# Patient Record
Sex: MALE | Race: WHITE | Employment: OTHER | URBAN - METROPOLITAN AREA
[De-identification: names, ages, dates, MRNs, and addresses within clinical notes are randomized per-mention and may not be internally consistent; named-entity substitution may affect disease eponyms.]

---

## 2017-05-23 ENCOUNTER — OFFICE VISIT (OUTPATIENT)
Dept: CARDIOLOGY CLINIC | Age: 62
End: 2017-05-23

## 2017-05-23 VITALS
HEIGHT: 69 IN | DIASTOLIC BLOOD PRESSURE: 80 MMHG | BODY MASS INDEX: 32.22 KG/M2 | HEART RATE: 84 BPM | SYSTOLIC BLOOD PRESSURE: 120 MMHG | WEIGHT: 217.5 LBS

## 2017-05-23 DIAGNOSIS — I10 ESSENTIAL HYPERTENSION: Primary | ICD-10-CM

## 2017-05-23 DIAGNOSIS — E78.2 MIXED HYPERLIPIDEMIA: ICD-10-CM

## 2017-05-23 DIAGNOSIS — R73.9 HYPERGLYCEMIA: ICD-10-CM

## 2017-05-23 PROCEDURE — 99214 OFFICE O/P EST MOD 30 MIN: CPT | Performed by: NURSE PRACTITIONER

## 2017-05-23 RX ORDER — BENAZEPRIL HYDROCHLORIDE 40 MG/1
40 TABLET, FILM COATED ORAL DAILY
Qty: 90 TABLET | Refills: 3 | Status: SHIPPED | OUTPATIENT
Start: 2017-05-23 | End: 2017-07-17 | Stop reason: SDUPTHER

## 2017-05-23 RX ORDER — AMLODIPINE BESYLATE 5 MG/1
5 TABLET ORAL DAILY
Qty: 90 TABLET | Refills: 3 | Status: SHIPPED | OUTPATIENT
Start: 2017-05-23 | End: 2017-07-17 | Stop reason: SDUPTHER

## 2017-06-23 ENCOUNTER — OFFICE VISIT (OUTPATIENT)
Dept: FAMILY MEDICINE CLINIC | Age: 62
End: 2017-06-23

## 2017-06-23 VITALS
DIASTOLIC BLOOD PRESSURE: 86 MMHG | SYSTOLIC BLOOD PRESSURE: 132 MMHG | TEMPERATURE: 99.1 F | RESPIRATION RATE: 16 BRPM | HEART RATE: 64 BPM | HEIGHT: 69 IN | OXYGEN SATURATION: 96 % | BODY MASS INDEX: 32.23 KG/M2 | WEIGHT: 217.6 LBS

## 2017-06-23 DIAGNOSIS — I10 ESSENTIAL HYPERTENSION: ICD-10-CM

## 2017-06-23 DIAGNOSIS — R73.9 HYPERGLYCEMIA: ICD-10-CM

## 2017-06-23 DIAGNOSIS — E11.9 NON-INSULIN TREATED TYPE 2 DIABETES MELLITUS (HCC): Primary | ICD-10-CM

## 2017-06-23 DIAGNOSIS — E78.49 OTHER HYPERLIPIDEMIA: ICD-10-CM

## 2017-06-23 DIAGNOSIS — K21.9 GASTROESOPHAGEAL REFLUX DISEASE, ESOPHAGITIS PRESENCE NOT SPECIFIED: ICD-10-CM

## 2017-06-23 PROCEDURE — 99214 OFFICE O/P EST MOD 30 MIN: CPT | Performed by: INTERNAL MEDICINE

## 2017-06-23 ASSESSMENT — PATIENT HEALTH QUESTIONNAIRE - PHQ9
SUM OF ALL RESPONSES TO PHQ9 QUESTIONS 1 & 2: 0
1. LITTLE INTEREST OR PLEASURE IN DOING THINGS: 0
SUM OF ALL RESPONSES TO PHQ QUESTIONS 1-9: 0
2. FEELING DOWN, DEPRESSED OR HOPELESS: 0

## 2017-06-23 ASSESSMENT — ENCOUNTER SYMPTOMS
DIARRHEA: 0
CONSTIPATION: 0
SHORTNESS OF BREATH: 0
WHEEZING: 0

## 2017-07-17 ENCOUNTER — HOSPITAL ENCOUNTER (OUTPATIENT)
Dept: DIABETES SERVICES | Age: 62
Discharge: HOME | End: 2017-07-18

## 2017-07-17 ENCOUNTER — HOSPITAL ENCOUNTER (OUTPATIENT)
Dept: OTHER | Age: 62
Discharge: HOME | End: 2017-07-31
Attending: INTERNAL MEDICINE | Admitting: INTERNAL MEDICINE

## 2017-07-17 DIAGNOSIS — E11.9 TYPE 2 DIABETES MELLITUS WITHOUT COMPLICATION, WITHOUT LONG-TERM CURRENT USE OF INSULIN (HCC): Primary | ICD-10-CM

## 2017-07-17 RX ORDER — AMLODIPINE BESYLATE 5 MG/1
5 TABLET ORAL DAILY
Qty: 90 TABLET | Refills: 3 | Status: SHIPPED | OUTPATIENT
Start: 2017-07-17 | End: 2018-05-15 | Stop reason: SDUPTHER

## 2017-07-17 RX ORDER — BENAZEPRIL HYDROCHLORIDE 40 MG/1
40 TABLET, FILM COATED ORAL DAILY
Qty: 90 TABLET | Refills: 2 | Status: SHIPPED | OUTPATIENT
Start: 2017-07-17 | End: 2018-05-15 | Stop reason: SDUPTHER

## 2017-07-17 ASSESSMENT — PATIENT HEALTH QUESTIONNAIRE - PHQ9
SUM OF ALL RESPONSES TO PHQ QUESTIONS 1-9: 1
2. FEELING DOWN, DEPRESSED OR HOPELESS: 1
SUM OF ALL RESPONSES TO PHQ9 QUESTIONS 1 & 2: 1
1. LITTLE INTEREST OR PLEASURE IN DOING THINGS: 0

## 2017-07-27 ENCOUNTER — OFFICE VISIT (OUTPATIENT)
Dept: FAMILY MEDICINE CLINIC | Age: 62
End: 2017-07-27

## 2017-07-27 VITALS
TEMPERATURE: 97.8 F | WEIGHT: 204 LBS | RESPIRATION RATE: 16 BRPM | SYSTOLIC BLOOD PRESSURE: 118 MMHG | BODY MASS INDEX: 30.13 KG/M2 | DIASTOLIC BLOOD PRESSURE: 62 MMHG | OXYGEN SATURATION: 98 % | HEART RATE: 90 BPM

## 2017-07-27 DIAGNOSIS — E11.9 NON-INSULIN TREATED TYPE 2 DIABETES MELLITUS (HCC): Primary | ICD-10-CM

## 2017-07-27 DIAGNOSIS — I10 ESSENTIAL HYPERTENSION: ICD-10-CM

## 2017-07-27 DIAGNOSIS — E11.9 NON-INSULIN TREATED TYPE 2 DIABETES MELLITUS (HCC): ICD-10-CM

## 2017-07-27 LAB
CREATININE URINE: 120.3 MG/DL (ref 39–259)
MICROALBUMIN UR-MCNC: <1.2 MG/DL
MICROALBUMIN/CREAT UR-RTO: NORMAL MG/G (ref 0–30)

## 2017-07-27 PROCEDURE — 99214 OFFICE O/P EST MOD 30 MIN: CPT | Performed by: INTERNAL MEDICINE

## 2017-07-27 ASSESSMENT — ENCOUNTER SYMPTOMS
WHEEZING: 0
DIARRHEA: 0
SHORTNESS OF BREATH: 0
NAUSEA: 0

## 2017-08-22 ENCOUNTER — HOSPITAL ENCOUNTER (OUTPATIENT)
Dept: DIABETES SERVICES | Age: 62
Discharge: HOME | End: 2017-08-23
Admitting: INTERNAL MEDICINE

## 2017-08-22 DIAGNOSIS — E11.9 TYPE 2 DIABETES MELLITUS WITHOUT COMPLICATION, WITHOUT LONG-TERM CURRENT USE OF INSULIN (HCC): Primary | ICD-10-CM

## 2017-09-01 ENCOUNTER — HOSPITAL ENCOUNTER (OUTPATIENT)
Dept: OTHER | Age: 62
Discharge: HOME | End: 2017-09-30
Attending: INTERNAL MEDICINE | Admitting: INTERNAL MEDICINE

## 2017-09-05 ENCOUNTER — HOSPITAL ENCOUNTER (OUTPATIENT)
Dept: DIABETES SERVICES | Age: 62
Discharge: HOME | End: 2017-09-06
Admitting: INTERNAL MEDICINE

## 2017-09-05 DIAGNOSIS — E11.9 TYPE 2 DIABETES MELLITUS WITHOUT COMPLICATION, WITHOUT LONG-TERM CURRENT USE OF INSULIN (HCC): Primary | ICD-10-CM

## 2017-09-12 ENCOUNTER — OFFICE VISIT (OUTPATIENT)
Dept: FAMILY MEDICINE CLINIC | Age: 62
End: 2017-09-12

## 2017-09-12 VITALS
BODY MASS INDEX: 29.33 KG/M2 | DIASTOLIC BLOOD PRESSURE: 70 MMHG | RESPIRATION RATE: 18 BRPM | HEIGHT: 69 IN | WEIGHT: 198 LBS | HEART RATE: 68 BPM | SYSTOLIC BLOOD PRESSURE: 122 MMHG

## 2017-09-12 DIAGNOSIS — E78.5 HYPERLIPIDEMIA, UNSPECIFIED HYPERLIPIDEMIA TYPE: ICD-10-CM

## 2017-09-12 DIAGNOSIS — E11.9 CONTROLLED TYPE 2 DIABETES MELLITUS WITHOUT COMPLICATION, WITHOUT LONG-TERM CURRENT USE OF INSULIN (HCC): Primary | ICD-10-CM

## 2017-09-12 DIAGNOSIS — I10 ESSENTIAL HYPERTENSION: ICD-10-CM

## 2017-09-12 PROCEDURE — 99213 OFFICE O/P EST LOW 20 MIN: CPT | Performed by: INTERNAL MEDICINE

## 2017-09-12 ASSESSMENT — ENCOUNTER SYMPTOMS
VOMITING: 0
DIARRHEA: 1
WHEEZING: 0
NAUSEA: 0
BACK PAIN: 0
SHORTNESS OF BREATH: 0

## 2017-09-16 ENCOUNTER — TELEPHONE (OUTPATIENT)
Dept: FAMILY MEDICINE CLINIC | Age: 62
End: 2017-09-16

## 2017-09-19 ENCOUNTER — HOSPITAL ENCOUNTER (OUTPATIENT)
Dept: DIABETES SERVICES | Age: 62
Discharge: HOME | End: 2017-09-20
Admitting: INTERNAL MEDICINE

## 2017-09-19 DIAGNOSIS — E11.9 TYPE 2 DIABETES MELLITUS WITHOUT COMPLICATION, WITHOUT LONG-TERM CURRENT USE OF INSULIN (HCC): Primary | ICD-10-CM

## 2017-09-22 ENCOUNTER — TELEPHONE (OUTPATIENT)
Dept: FAMILY MEDICINE CLINIC | Age: 62
End: 2017-09-22

## 2017-09-27 ENCOUNTER — TELEPHONE (OUTPATIENT)
Dept: FAMILY MEDICINE CLINIC | Age: 62
End: 2017-09-27

## 2017-10-24 ENCOUNTER — HOSPITAL ENCOUNTER (OUTPATIENT)
Dept: DIABETES SERVICES | Age: 62
Discharge: HOME | End: 2017-10-25
Admitting: INTERNAL MEDICINE

## 2017-10-24 DIAGNOSIS — E11.9 TYPE 2 DIABETES MELLITUS WITHOUT COMPLICATION, WITHOUT LONG-TERM CURRENT USE OF INSULIN (HCC): Primary | ICD-10-CM

## 2017-11-01 ENCOUNTER — HOSPITAL ENCOUNTER (OUTPATIENT)
Dept: OTHER | Age: 62
Discharge: HOME | End: 2017-11-30
Attending: INTERNAL MEDICINE | Admitting: INTERNAL MEDICINE

## 2017-12-08 ENCOUNTER — TELEPHONE (OUTPATIENT)
Dept: FAMILY MEDICINE CLINIC | Age: 62
End: 2017-12-08

## 2017-12-11 ENCOUNTER — TELEPHONE (OUTPATIENT)
Dept: FAMILY MEDICINE CLINIC | Age: 62
End: 2017-12-11

## 2017-12-11 NOTE — TELEPHONE ENCOUNTER
pls tell him the orders are already in the computer. Were placed before . Come in fasting and will also ck chol.

## 2017-12-11 NOTE — TELEPHONE ENCOUNTER
Pt given message per Dr Mccarthy Du Bois  Will call back with fax number of where he would like labs sent

## 2018-01-10 ENCOUNTER — NURSE ONLY (OUTPATIENT)
Dept: FAMILY MEDICINE CLINIC | Age: 63
End: 2018-01-10

## 2018-01-10 ENCOUNTER — OFFICE VISIT (OUTPATIENT)
Dept: FAMILY MEDICINE CLINIC | Age: 63
End: 2018-01-10

## 2018-01-10 VITALS
DIASTOLIC BLOOD PRESSURE: 72 MMHG | SYSTOLIC BLOOD PRESSURE: 118 MMHG | RESPIRATION RATE: 14 BRPM | HEART RATE: 58 BPM | WEIGHT: 201 LBS | HEIGHT: 69 IN | BODY MASS INDEX: 29.77 KG/M2 | OXYGEN SATURATION: 96 %

## 2018-01-10 DIAGNOSIS — K21.9 GASTROESOPHAGEAL REFLUX DISEASE, ESOPHAGITIS PRESENCE NOT SPECIFIED: Primary | ICD-10-CM

## 2018-01-10 DIAGNOSIS — I10 ESSENTIAL HYPERTENSION: ICD-10-CM

## 2018-01-10 DIAGNOSIS — Z23 NEED FOR 23-POLYVALENT PNEUMOCOCCAL POLYSACCHARIDE VACCINE: Primary | ICD-10-CM

## 2018-01-10 DIAGNOSIS — E11.9 CONTROLLED TYPE 2 DIABETES MELLITUS WITHOUT COMPLICATION, WITHOUT LONG-TERM CURRENT USE OF INSULIN (HCC): ICD-10-CM

## 2018-01-10 DIAGNOSIS — Z23 NEEDS FLU SHOT: ICD-10-CM

## 2018-01-10 PROCEDURE — 90471 IMMUNIZATION ADMIN: CPT | Performed by: INTERNAL MEDICINE

## 2018-01-10 PROCEDURE — 90732 PPSV23 VACC 2 YRS+ SUBQ/IM: CPT | Performed by: INTERNAL MEDICINE

## 2018-01-10 PROCEDURE — 90472 IMMUNIZATION ADMIN EACH ADD: CPT | Performed by: INTERNAL MEDICINE

## 2018-01-10 PROCEDURE — 90630 INFLUENZA, QUADV, 18-64 YRS, ID, PF, MICRO INJ, 0.1ML (FLUZONE QUADV, PF): CPT | Performed by: INTERNAL MEDICINE

## 2018-01-10 PROCEDURE — 99214 OFFICE O/P EST MOD 30 MIN: CPT | Performed by: INTERNAL MEDICINE

## 2018-01-10 ASSESSMENT — ENCOUNTER SYMPTOMS
WHEEZING: 0
DIARRHEA: 1
SHORTNESS OF BREATH: 0
CONSTIPATION: 0

## 2018-01-10 NOTE — PROGRESS NOTES
Pt was here for a visit with Dr. Luciano Sargent and left without getting vaccines. He came back to get his flu and [id number]. Vaccine Information Sheet, \"Influenza - Inactivated\"  given to Catrachito Sanabria, or parent/legal guardian of  Catrachito Sanabria and verbalized understanding. Patient responses:    Have you ever had a reaction to a flu vaccine? No  Are you able to eat eggs without adverse effects? No  Do you have any current illness? No  Have you ever had Guillian Viper Syndrome? No    Flu vaccine given per order. Please see immunization tab.

## 2018-03-05 ENCOUNTER — TELEPHONE (OUTPATIENT)
Dept: CARDIOLOGY CLINIC | Age: 63
End: 2018-03-05

## 2018-03-05 DIAGNOSIS — E78.5 HYPERLIPIDEMIA, UNSPECIFIED HYPERLIPIDEMIA TYPE: Primary | ICD-10-CM

## 2018-05-15 ENCOUNTER — OFFICE VISIT (OUTPATIENT)
Dept: CARDIOLOGY CLINIC | Age: 63
End: 2018-05-15

## 2018-05-15 VITALS
BODY MASS INDEX: 31.1 KG/M2 | HEART RATE: 62 BPM | HEIGHT: 69 IN | WEIGHT: 210 LBS | SYSTOLIC BLOOD PRESSURE: 120 MMHG | DIASTOLIC BLOOD PRESSURE: 70 MMHG

## 2018-05-15 DIAGNOSIS — E78.5 HYPERLIPIDEMIA, UNSPECIFIED HYPERLIPIDEMIA TYPE: ICD-10-CM

## 2018-05-15 DIAGNOSIS — I10 ESSENTIAL HYPERTENSION: Primary | ICD-10-CM

## 2018-05-15 PROCEDURE — 99213 OFFICE O/P EST LOW 20 MIN: CPT | Performed by: NURSE PRACTITIONER

## 2018-05-15 RX ORDER — AMLODIPINE BESYLATE 5 MG/1
5 TABLET ORAL DAILY
Qty: 90 TABLET | Refills: 3 | Status: SHIPPED | OUTPATIENT
Start: 2018-05-15 | End: 2018-08-22 | Stop reason: SDUPTHER

## 2018-05-15 RX ORDER — BENAZEPRIL HYDROCHLORIDE 40 MG/1
40 TABLET, FILM COATED ORAL DAILY
Qty: 90 TABLET | Refills: 2 | Status: SHIPPED | OUTPATIENT
Start: 2018-05-15 | End: 2018-06-28 | Stop reason: SDUPTHER

## 2018-05-16 ENCOUNTER — TELEPHONE (OUTPATIENT)
Dept: FAMILY MEDICINE CLINIC | Age: 63
End: 2018-05-16

## 2018-05-21 ENCOUNTER — HOSPITAL ENCOUNTER (OUTPATIENT)
Dept: OTHER | Age: 63
Discharge: HOME | End: 2018-05-21
Attending: INTERNAL MEDICINE | Admitting: INTERNAL MEDICINE

## 2018-05-21 ENCOUNTER — OFFICE VISIT (OUTPATIENT)
Dept: FAMILY MEDICINE CLINIC | Age: 63
End: 2018-05-21

## 2018-05-21 VITALS
RESPIRATION RATE: 16 BRPM | BODY MASS INDEX: 31.1 KG/M2 | HEIGHT: 69 IN | DIASTOLIC BLOOD PRESSURE: 70 MMHG | WEIGHT: 210 LBS | OXYGEN SATURATION: 97 % | SYSTOLIC BLOOD PRESSURE: 120 MMHG | HEART RATE: 60 BPM

## 2018-05-21 DIAGNOSIS — Z00.00 PE (PHYSICAL EXAM), ANNUAL: Primary | ICD-10-CM

## 2018-05-21 DIAGNOSIS — R06.02 SOB (SHORTNESS OF BREATH) ON EXERTION: ICD-10-CM

## 2018-05-21 DIAGNOSIS — I10 ESSENTIAL HYPERTENSION: ICD-10-CM

## 2018-05-21 DIAGNOSIS — E11.9 CONTROLLED TYPE 2 DIABETES MELLITUS WITHOUT COMPLICATION, WITHOUT LONG-TERM CURRENT USE OF INSULIN (HCC): ICD-10-CM

## 2018-05-21 DIAGNOSIS — Z12.5 SCREENING FOR PROSTATE CANCER: ICD-10-CM

## 2018-05-21 PROCEDURE — 99396 PREV VISIT EST AGE 40-64: CPT | Performed by: INTERNAL MEDICINE

## 2018-05-21 PROCEDURE — 93000 ELECTROCARDIOGRAM COMPLETE: CPT | Performed by: INTERNAL MEDICINE

## 2018-05-21 RX ORDER — METFORMIN HYDROCHLORIDE 500 MG/1
TABLET, EXTENDED RELEASE ORAL
Qty: 90 TABLET | Refills: 3 | Status: SHIPPED | OUTPATIENT
Start: 2018-05-21 | End: 2018-08-20 | Stop reason: SDUPTHER

## 2018-05-21 ASSESSMENT — ENCOUNTER SYMPTOMS
COLOR CHANGE: 0
EYE PAIN: 0
CONSTIPATION: 0
DIARRHEA: 0
VOMITING: 0
NAUSEA: 0
WHEEZING: 0
SHORTNESS OF BREATH: 1

## 2018-06-28 RX ORDER — BENAZEPRIL HYDROCHLORIDE 40 MG/1
40 TABLET, FILM COATED ORAL DAILY
Qty: 90 TABLET | Refills: 2 | Status: SHIPPED | OUTPATIENT
Start: 2018-06-28 | End: 2019-06-24 | Stop reason: DRUGHIGH

## 2018-08-13 LAB
AVERAGE GLUCOSE: NORMAL
HBA1C MFR BLD: 5.7 %
PROSTATE SPECIFIC ANTIGEN FREE: NORMAL NG/ML
PROSTATE SPECIFIC ANTIGEN PERCENT FREE: NORMAL %
PSA-PROSTATE SPECIFIC AG: 1

## 2018-08-14 ENCOUNTER — TELEPHONE (OUTPATIENT)
Dept: FAMILY MEDICINE CLINIC | Age: 63
End: 2018-08-14

## 2018-08-14 NOTE — TELEPHONE ENCOUNTER
Call pt ,  Reviewed labs from [facility] from  8/13/18  psa fine  hga1c is just barely on the prediabetes cut off . Much better than a prior level of  7.7  Congratulations.   Renal profile is normal

## 2018-08-20 ENCOUNTER — OFFICE VISIT (OUTPATIENT)
Dept: FAMILY MEDICINE CLINIC | Age: 63
End: 2018-08-20

## 2018-08-20 VITALS
HEART RATE: 69 BPM | WEIGHT: 205 LBS | DIASTOLIC BLOOD PRESSURE: 72 MMHG | SYSTOLIC BLOOD PRESSURE: 116 MMHG | BODY MASS INDEX: 30.36 KG/M2 | OXYGEN SATURATION: 96 % | TEMPERATURE: 98.2 F | RESPIRATION RATE: 16 BRPM | HEIGHT: 69 IN

## 2018-08-20 DIAGNOSIS — R73.9 HYPERGLYCEMIA: Primary | ICD-10-CM

## 2018-08-20 DIAGNOSIS — I10 ESSENTIAL HYPERTENSION: ICD-10-CM

## 2018-08-20 DIAGNOSIS — E78.5 DYSLIPIDEMIA: ICD-10-CM

## 2018-08-20 DIAGNOSIS — E78.5 HYPERLIPIDEMIA, UNSPECIFIED HYPERLIPIDEMIA TYPE: ICD-10-CM

## 2018-08-20 DIAGNOSIS — E11.9 CONTROLLED TYPE 2 DIABETES MELLITUS WITHOUT COMPLICATION, WITHOUT LONG-TERM CURRENT USE OF INSULIN (HCC): ICD-10-CM

## 2018-08-20 PROCEDURE — 99214 OFFICE O/P EST MOD 30 MIN: CPT | Performed by: INTERNAL MEDICINE

## 2018-08-20 RX ORDER — METFORMIN HYDROCHLORIDE 500 MG/1
TABLET, EXTENDED RELEASE ORAL
Qty: 90 TABLET | Refills: 3 | Status: SHIPPED | OUTPATIENT
Start: 2018-08-20 | End: 2018-12-06 | Stop reason: SDUPTHER

## 2018-08-20 ASSESSMENT — PATIENT HEALTH QUESTIONNAIRE - PHQ9
SUM OF ALL RESPONSES TO PHQ QUESTIONS 1-9: 0
1. LITTLE INTEREST OR PLEASURE IN DOING THINGS: 0
SUM OF ALL RESPONSES TO PHQ QUESTIONS 1-9: 0
2. FEELING DOWN, DEPRESSED OR HOPELESS: 0
SUM OF ALL RESPONSES TO PHQ9 QUESTIONS 1 & 2: 0

## 2018-08-20 ASSESSMENT — ENCOUNTER SYMPTOMS
CONSTIPATION: 0
DIARRHEA: 0

## 2018-08-20 NOTE — PROGRESS NOTES
and palpitations. Gastrointestinal: Negative for constipation and diarrhea. Neurological: Positive for headaches (caffeine withdraw headache). Negative for dizziness. Health Maintenance   Topic Date Due    Hepatitis C screen  1955    HIV screen  11/28/1970    Shingles Vaccine (1 of 2 - 2 Dose Series) 11/28/2005    Diabetic microalbuminuria test  07/27/2018    Flu vaccine (1) 09/01/2018    DTaP/Tdap/Td vaccine (2 - Td) 02/24/2019    Diabetic retinal exam  04/21/2019    Lipid screen  05/03/2019    Potassium monitoring  05/03/2019    Creatinine monitoring  05/03/2019    Diabetic foot exam  05/21/2019    A1C test (Diabetic or Prediabetic)  08/13/2019    Colon cancer screen colonoscopy  01/01/2025    Pneumococcal med risk  Completed      Social History     Social History    Marital status:      Spouse name: N/A    Number of children: 3    Years of education: N/A     Occupational History    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 Prior to Visit Medications    Medication Sig Taking?  Authorizing Provider   metFORMIN (GLUCOPHAGE) 500 MG tablet TAKE 1 TABLET TWICE DAILY  WITH MEALS Yes Evangelista Louis MD   benazepril (LOTENSIN) 40 MG tablet Take 1 tablet by mouth daily Yes GAL Euceda CNP   metFORMIN (GLUCOPHAGE XR) 500 MG extended release tablet Take 3 pills in the am Yes Evangelista Louis MD   amLODIPine (NORVASC) 5 MG tablet Take 1 tablet by mouth daily Yes Bradley Robe, APRN - CNP [facility] primary prevention;  <70 mg/dL for patients with CHD or diabetic patients  with > or = 2 CHD risk factors. LDL-C is now calculated using the Isrrael-Casanova  calculation, which is a validated novel method providing  better accuracy than the Friedewald equation in the  estimation of LDL-C. Wade Whaley et al. Tammie Route. 3298;342(34): 8392-7277  (http://[employer]. TopSchool/faq/RWV132)     05/03/2018 76 mg/dL (calc) Final     Comment:     Reference range: <100  Desirable range <100 mg/dL for primary prevention;  <70 mg/dL for patients with CHD or diabetic patients  with > or = 2 CHD risk factors. LDL-C is now calculated using the Isrrael-Casanova  calculation, which is a validated novel method providing  better accuracy than the Friedewald equation in the  estimation of LDL-C. Wade Whaley et al. [facility] Route. 9365;24504): 0383-8346  (http://[employer]. [employer]/faq/BPG207)       HDL   Date Value Ref Range Status   05/03/2018 46 >40 mg/dL Final   05/03/2018 46 >40 mg/dL Final   08/19/2010 40 40 - 60 mg/dl Final     Triglycerides   Date Value Ref Range Status   05/03/2018 140 <150 mg/dL Final   05/03/2018 140 <150 mg/dL Final     Lab Results   Component Value Date    ALT 23 05/03/2018    AST 24 05/03/2018    ALKPHOS 67 05/09/2016    BILITOT 1.3 (H) 05/09/2016      Lab Results   Component Value Date    WBC 7.5 07/25/2016    HGB 15.8 07/25/2016    HCT 47.4 07/25/2016    MCV 96.0 07/25/2016     07/25/2016     TSH (uIU/mL)   Date Value   10/28/2013 1.19     Lab Results   Component Value Date    LABA1C 5.7 08/13/2018     Lab Results   Component Value Date    PSA 1.0 08/13/2018    PSA 0.77 10/28/2013        PHYSICAL EXAM:  /72 (Site: Right Arm, Position: Sitting, Cuff Size: Medium Adult)   Pulse 69   Temp 98.2 °F (36.8 °C) (Oral)   Resp 16   Ht 5' 9\" (1.753 m)   Wt 205 lb (93 kg)   SpO2 96%   BMI 30.27 kg/m²    Physical Exam   Constitutional: He appears well-developed and well-nourished.    HENT: Head: Normocephalic and atraumatic. Cardiovascular: Normal rate and regular rhythm. No murmur heard. Pulmonary/Chest: Effort normal and breath sounds normal. He has no wheezes. Abdominal: There is no tenderness. Neurological: He is alert. Skin: Skin is warm and dry. Psychiatric: He has a normal mood and affect. His behavior is normal. Judgment and thought content normal.     BP Readings from Last 5 Encounters:   08/20/18 116/72   05/21/18 120/70   05/15/18 120/70   01/10/18 118/72   09/12/17 122/70       Wt Readings from Last 5 Encounters:   08/20/18 205 lb (93 kg)   05/21/18 210 lb (95.3 kg)   05/15/18 210 lb (95.3 kg)   01/10/18 201 lb (91.2 kg)   09/12/17 198 lb (89.8 kg)        ASSESSMENT/PLAN:  Lawanda Wood was seen today for diabetes. Diagnoses and all orders for this visit:      Essential hypertension  -     Comprehensive Metabolic Panel; Future    Controlled type 2 diabetes mellitus without complication, without long-term current use of insulin (HCC)  -     Microalbumin / creatinine urine ratio; Future    Hyperlipidemia, unspecified hyperlipidemia type    Other orders  -     metFORMIN (GLUCOPHAGE XR) 500 MG extended release tablet; Take 1 in the am and 2 in the pm.    doing great  Fu in  6m  He does not want to start chol med at this time  Gave him information links about ascvd risk sore.

## 2018-08-20 NOTE — PROGRESS NOTES
Letter sent.
history on file. Prior to Visit Medications    Medication Sig Taking? Authorizing Provider   metFORMIN (GLUCOPHAGE) 500 MG tablet Take 2 bid Yes Leslye Post MD   [facility] DELICA LANCETS FINE MISC 3 each by Does not apply route 3 times daily Yes Leslye Post MD   Blood Glucose Monitoring Suppl (ONE TOUCH ULTRA 2) w/Device KIT 1 kit by Other route 3 times daily Yes Leslye Post MD   ONE TOUCH ULTRA TEST strip TEST BLOOD SUGAR ONCE DAILY Yes Leslye Post MD   amLODIPine (NORVASC) 5 MG tablet Take 1 tablet by mouth daily Yes Pat Wagner NP   benazepril (LOTENSIN) 40 MG tablet Take 1 tablet by mouth daily Yes Pat Wagner NP   Esomeprazole Magnesium (NEXIUM PO) Take by mouth Yes Historical Provider, MD     Patient Active Problem List   Diagnosis    Kidney stone on right side-(7/11)    ED (erectile dysfunction) of organic origin    Colonic polyps(COLO 2009--REPEAT 1/15-polyp x 1-ghastine    Hyperlipemia    Hyperglycemia    Obesity-advised diet/exercise    Anisocoria-right eye--chronic     H/O low back pain,Mild     Medial meniscus tear,Right    GERD (gastroesophageal reflux disease)--on daily ppi    Essential hypertension    Closed fracture of radius    Carpal tunnel syndrome of left wrist        LABS:   Lab Results   Component Value Date    GLUCOSE 154 (H) 05/15/2017     Lab Results   Component Value Date     05/15/2017    K 4.4 05/15/2017    CREATININE 0.91 05/15/2017     Cholesterol, Total   Date Value Ref Range Status   05/15/2017 169 125 - 200 mg/dL Final     Cholesterol   Date Value Ref Range Status   05/15/2017 125 mg/dL (calc) Final     Comment:     Target for non-HDL cholesterol is 30 mg/dL higher than  LDL cholesterol target.        LDL Calculated   Date Value Ref Range Status   05/15/2017 79 <130 mg/dL (calc) Final     Comment:     Desirable range <100 mg/dL for patients with CHD or  diabetes and <70 mg/dL for diabetic patients with  known heart

## 2018-08-22 RX ORDER — AMLODIPINE BESYLATE 5 MG/1
5 TABLET ORAL DAILY
Qty: 90 TABLET | Refills: 3 | Status: ON HOLD | OUTPATIENT
Start: 2018-08-22 | End: 2019-04-17 | Stop reason: HOSPADM

## 2018-08-28 ENCOUNTER — TELEPHONE (OUTPATIENT)
Dept: FAMILY MEDICINE CLINIC | Age: 63
End: 2018-08-28

## 2018-08-28 PROBLEM — E78.5 DYSLIPIDEMIA: Status: ACTIVE | Noted: 2018-08-28

## 2018-08-28 NOTE — TELEPHONE ENCOUNTER
Call pt,  We may have discussed this already , but did not see the scanned lab  His psa was normal  At  1.0. From [facility] in august.    hga1c was  5.7 -barely in the prediabetes range.

## 2018-12-06 RX ORDER — METFORMIN HYDROCHLORIDE 500 MG/1
TABLET, EXTENDED RELEASE ORAL
Qty: 90 TABLET | Refills: 1 | Status: SHIPPED | OUTPATIENT
Start: 2018-12-06 | End: 2019-02-17 | Stop reason: SDUPTHER

## 2018-12-06 NOTE — TELEPHONE ENCOUNTER
From: Miguelina Chisholm  Sent: 12/6/2018 10:01 AM EST  Subject: Medication Renewal Request    Rafael JIMMaryan Engel would like a refill of the following medications:     metFORMIN (GLUCOPHAGE XR) 500 MG extended release tablet Deidra Kenny MD]   Patient Comment: At last doctor visit, prescription was changed to this (one in morning, two in evening). However [employer] order ([facility]) pharmacy was never updated to reflect this. Thus, I receive old presciption which is regular Metformin 2X/day. Please update.     Preferred pharmacy: [facility] [address], [place] - [place] [phone]

## 2019-02-14 LAB
AVERAGE GLUCOSE: NORMAL
HBA1C MFR BLD: 6.2 %

## 2019-02-18 RX ORDER — METFORMIN HYDROCHLORIDE 500 MG/1
TABLET, EXTENDED RELEASE ORAL
Qty: 90 TABLET | Refills: 1 | Status: SHIPPED | OUTPATIENT
Start: 2019-02-18 | End: 2019-04-10 | Stop reason: SDUPTHER

## 2019-02-21 ENCOUNTER — TELEPHONE (OUTPATIENT)
Dept: CARDIOLOGY CLINIC | Age: 64
End: 2019-02-21

## 2019-02-21 DIAGNOSIS — E78.5 HYPERLIPIDEMIA, UNSPECIFIED HYPERLIPIDEMIA TYPE: Primary | ICD-10-CM

## 2019-02-25 ENCOUNTER — OFFICE VISIT (OUTPATIENT)
Dept: FAMILY MEDICINE CLINIC | Age: 64
End: 2019-02-25
Payer: COMMERCIAL

## 2019-02-25 VITALS
DIASTOLIC BLOOD PRESSURE: 80 MMHG | RESPIRATION RATE: 14 BRPM | OXYGEN SATURATION: 96 % | HEART RATE: 66 BPM | BODY MASS INDEX: 31.55 KG/M2 | SYSTOLIC BLOOD PRESSURE: 128 MMHG | WEIGHT: 213 LBS | HEIGHT: 69 IN

## 2019-02-25 DIAGNOSIS — E11.9 CONTROLLED TYPE 2 DIABETES MELLITUS WITHOUT COMPLICATION, WITHOUT LONG-TERM CURRENT USE OF INSULIN (HCC): Primary | ICD-10-CM

## 2019-02-25 DIAGNOSIS — I10 ESSENTIAL HYPERTENSION: ICD-10-CM

## 2019-02-25 DIAGNOSIS — C44.311 BASAL CELL CARCINOMA (BCC) OF SKIN OF NOSE: ICD-10-CM

## 2019-02-25 PROCEDURE — 99213 OFFICE O/P EST LOW 20 MIN: CPT | Performed by: INTERNAL MEDICINE

## 2019-02-25 ASSESSMENT — ENCOUNTER SYMPTOMS
WHEEZING: 0
VOMITING: 0
DIARRHEA: 0
SHORTNESS OF BREATH: 0

## 2019-02-25 ASSESSMENT — PATIENT HEALTH QUESTIONNAIRE - PHQ9
SUM OF ALL RESPONSES TO PHQ QUESTIONS 1-9: 0
SUM OF ALL RESPONSES TO PHQ9 QUESTIONS 1 & 2: 0
SUM OF ALL RESPONSES TO PHQ QUESTIONS 1-9: 0
2. FEELING DOWN, DEPRESSED OR HOPELESS: 0
1. LITTLE INTEREST OR PLEASURE IN DOING THINGS: 0

## 2019-04-11 RX ORDER — METFORMIN HYDROCHLORIDE 500 MG/1
TABLET, EXTENDED RELEASE ORAL
Qty: 90 TABLET | Refills: 0 | Status: SHIPPED | OUTPATIENT
Start: 2019-04-11 | End: 2019-04-30 | Stop reason: SDUPTHER

## 2019-04-16 ENCOUNTER — HOSPITAL ENCOUNTER (INPATIENT)
Age: 64
LOS: 1 days | Discharge: HOME | DRG: 310 | End: 2019-04-17
Attending: FAMILY MEDICINE | Admitting: INTERNAL MEDICINE
Payer: COMMERCIAL

## 2019-04-16 ENCOUNTER — APPOINTMENT (OUTPATIENT)
Dept: GENERAL RADIOLOGY | Age: 64
DRG: 310 | End: 2019-04-16
Payer: COMMERCIAL

## 2019-04-16 DIAGNOSIS — R07.9 ACUTE CHEST PAIN: ICD-10-CM

## 2019-04-16 DIAGNOSIS — Z78.9 ALCOHOL USE: ICD-10-CM

## 2019-04-16 DIAGNOSIS — I48.91 ATRIAL FIBRILLATION WITH RVR (HCC): ICD-10-CM

## 2019-04-16 DIAGNOSIS — I48.91 NEW ONSET A-FIB (HCC): Primary | ICD-10-CM

## 2019-04-16 PROBLEM — I20.89 ANGINA AT REST: Status: ACTIVE | Noted: 2019-04-16

## 2019-04-16 PROBLEM — I20.8 ANGINA AT REST (HCC): Status: ACTIVE | Noted: 2019-04-16

## 2019-04-16 LAB
ANION GAP SERPL CALCULATED.3IONS-SCNC: 21 MMOL/L (ref 3–16)
BUN BLDV-MCNC: 18 MG/DL (ref 7–20)
CALCIUM SERPL-MCNC: 10.3 MG/DL (ref 8.3–10.6)
CHLORIDE BLD-SCNC: 96 MMOL/L (ref 99–110)
CO2: 20 MMOL/L (ref 21–32)
CREAT SERPL-MCNC: 1 MG/DL (ref 0.8–1.3)
D DIMER: 218 NG/ML DDU (ref 0–229)
ETHANOL: 41 MG/DL (ref 0–0.08)
GFR AFRICAN AMERICAN: >60
GFR NON-AFRICAN AMERICAN: >60
GLUCOSE BLD-MCNC: 171 MG/DL (ref 70–99)
GLUCOSE BLD-MCNC: 176 MG/DL (ref 70–99)
GLUCOSE BLD-MCNC: 181 MG/DL (ref 70–99)
GLUCOSE BLD-MCNC: 183 MG/DL (ref 70–99)
GLUCOSE BLD-MCNC: 216 MG/DL (ref 70–99)
HCT VFR BLD CALC: 49.6 % (ref 40.5–52.5)
HEMOGLOBIN: 17 G/DL (ref 13.5–17.5)
MAGNESIUM: 2 MG/DL (ref 1.8–2.4)
MCH RBC QN AUTO: 33.3 PG (ref 26–34)
MCHC RBC AUTO-ENTMCNC: 34.2 G/DL (ref 31–36)
MCV RBC AUTO: 97.5 FL (ref 80–100)
PDW BLD-RTO: 13.2 % (ref 12.4–15.4)
PERFORMED ON: ABNORMAL
PLATELET # BLD: 267 K/UL (ref 135–450)
PMV BLD AUTO: 8.4 FL (ref 5–10.5)
POTASSIUM REFLEX MAGNESIUM: 4.7 MMOL/L (ref 3.5–5.1)
PRO-BNP: 22 PG/ML (ref 0–124)
RBC # BLD: 5.09 M/UL (ref 4.2–5.9)
SODIUM BLD-SCNC: 137 MMOL/L (ref 136–145)
T4 FREE: 1.3 NG/DL (ref 0.9–1.8)
TROPONIN: <0.01 NG/ML
TROPONIN: <0.01 NG/ML
TSH SERPL DL<=0.05 MIU/L-ACNC: 0.71 UIU/ML (ref 0.27–4.2)
WBC # BLD: 10.6 K/UL (ref 4–11)

## 2019-04-16 PROCEDURE — 96365 THER/PROPH/DIAG IV INF INIT: CPT

## 2019-04-16 PROCEDURE — 6370000000 HC RX 637 (ALT 250 FOR IP): Performed by: FAMILY MEDICINE

## 2019-04-16 PROCEDURE — 80048 BASIC METABOLIC PNL TOTAL CA: CPT

## 2019-04-16 PROCEDURE — 99254 IP/OBS CNSLTJ NEW/EST MOD 60: CPT | Performed by: INTERNAL MEDICINE

## 2019-04-16 PROCEDURE — 96366 THER/PROPH/DIAG IV INF ADDON: CPT

## 2019-04-16 PROCEDURE — 6370000000 HC RX 637 (ALT 250 FOR IP): Performed by: INTERNAL MEDICINE

## 2019-04-16 PROCEDURE — 93010 ELECTROCARDIOGRAM REPORT: CPT | Performed by: INTERNAL MEDICINE

## 2019-04-16 PROCEDURE — 85379 FIBRIN DEGRADATION QUANT: CPT

## 2019-04-16 PROCEDURE — 83735 ASSAY OF MAGNESIUM: CPT

## 2019-04-16 PROCEDURE — 83880 ASSAY OF NATRIURETIC PEPTIDE: CPT

## 2019-04-16 PROCEDURE — 2060000000 HC ICU INTERMEDIATE R&B

## 2019-04-16 PROCEDURE — 96367 TX/PROPH/DG ADDL SEQ IV INF: CPT

## 2019-04-16 PROCEDURE — 2500000003 HC RX 250 WO HCPCS: Performed by: INTERNAL MEDICINE

## 2019-04-16 PROCEDURE — 2500000003 HC RX 250 WO HCPCS: Performed by: PHYSICIAN ASSISTANT

## 2019-04-16 PROCEDURE — 85027 COMPLETE CBC AUTOMATED: CPT

## 2019-04-16 PROCEDURE — 94760 N-INVAS EAR/PLS OXIMETRY 1: CPT

## 2019-04-16 PROCEDURE — 93005 ELECTROCARDIOGRAM TRACING: CPT | Performed by: PHYSICIAN ASSISTANT

## 2019-04-16 PROCEDURE — 2580000003 HC RX 258: Performed by: INTERNAL MEDICINE

## 2019-04-16 PROCEDURE — 96376 TX/PRO/DX INJ SAME DRUG ADON: CPT

## 2019-04-16 PROCEDURE — 71045 X-RAY EXAM CHEST 1 VIEW: CPT

## 2019-04-16 PROCEDURE — G0480 DRUG TEST DEF 1-7 CLASSES: HCPCS

## 2019-04-16 PROCEDURE — 84439 ASSAY OF FREE THYROXINE: CPT

## 2019-04-16 PROCEDURE — 2500000003 HC RX 250 WO HCPCS: Performed by: FAMILY MEDICINE

## 2019-04-16 PROCEDURE — 36415 COLL VENOUS BLD VENIPUNCTURE: CPT

## 2019-04-16 PROCEDURE — 6370000000 HC RX 637 (ALT 250 FOR IP): Performed by: STUDENT IN AN ORGANIZED HEALTH CARE EDUCATION/TRAINING PROGRAM

## 2019-04-16 PROCEDURE — 6360000002 HC RX W HCPCS: Performed by: INTERNAL MEDICINE

## 2019-04-16 PROCEDURE — 6360000002 HC RX W HCPCS: Performed by: FAMILY MEDICINE

## 2019-04-16 PROCEDURE — 84443 ASSAY THYROID STIM HORMONE: CPT

## 2019-04-16 PROCEDURE — 84484 ASSAY OF TROPONIN QUANT: CPT

## 2019-04-16 PROCEDURE — 2580000003 HC RX 258: Performed by: PHYSICIAN ASSISTANT

## 2019-04-16 PROCEDURE — 99285 EMERGENCY DEPT VISIT HI MDM: CPT

## 2019-04-16 RX ORDER — MAGNESIUM SULFATE IN WATER 40 MG/ML
2 INJECTION, SOLUTION INTRAVENOUS ONCE
Status: COMPLETED | OUTPATIENT
Start: 2019-04-16 | End: 2019-04-16

## 2019-04-16 RX ORDER — METFORMIN HYDROCHLORIDE 500 MG/1
500 TABLET, EXTENDED RELEASE ORAL 2 TIMES DAILY WITH MEALS
Status: DISCONTINUED | OUTPATIENT
Start: 2019-04-16 | End: 2019-04-16

## 2019-04-16 RX ORDER — SODIUM CHLORIDE 0.9 % (FLUSH) 0.9 %
10 SYRINGE (ML) INJECTION PRN
Status: DISCONTINUED | OUTPATIENT
Start: 2019-04-16 | End: 2019-04-17 | Stop reason: HOSPADM

## 2019-04-16 RX ORDER — DILTIAZEM HYDROCHLORIDE 60 MG/1
60 TABLET, FILM COATED ORAL EVERY 6 HOURS SCHEDULED
Status: DISCONTINUED | OUTPATIENT
Start: 2019-04-16 | End: 2019-04-17

## 2019-04-16 RX ORDER — DEXTROSE MONOHYDRATE 50 MG/ML
100 INJECTION, SOLUTION INTRAVENOUS PRN
Status: DISCONTINUED | OUTPATIENT
Start: 2019-04-16 | End: 2019-04-17 | Stop reason: HOSPADM

## 2019-04-16 RX ORDER — PANTOPRAZOLE SODIUM 40 MG/1
40 TABLET, DELAYED RELEASE ORAL
Status: DISCONTINUED | OUTPATIENT
Start: 2019-04-16 | End: 2019-04-17 | Stop reason: HOSPADM

## 2019-04-16 RX ORDER — DILTIAZEM HYDROCHLORIDE 5 MG/ML
10 INJECTION INTRAVENOUS ONCE
Status: COMPLETED | OUTPATIENT
Start: 2019-04-16 | End: 2019-04-16

## 2019-04-16 RX ORDER — NICOTINE POLACRILEX 4 MG
15 LOZENGE BUCCAL PRN
Status: DISCONTINUED | OUTPATIENT
Start: 2019-04-16 | End: 2019-04-17 | Stop reason: HOSPADM

## 2019-04-16 RX ORDER — ASPIRIN 81 MG/1
324 TABLET, CHEWABLE ORAL ONCE
Status: COMPLETED | OUTPATIENT
Start: 2019-04-16 | End: 2019-04-16

## 2019-04-16 RX ORDER — THIAMINE MONONITRATE (VIT B1) 100 MG
100 TABLET ORAL DAILY
Status: DISCONTINUED | OUTPATIENT
Start: 2019-04-16 | End: 2019-04-17 | Stop reason: HOSPADM

## 2019-04-16 RX ORDER — ALBUTEROL SULFATE 90 UG/1
2 AEROSOL, METERED RESPIRATORY (INHALATION) EVERY 6 HOURS PRN
COMMUNITY
End: 2020-05-21

## 2019-04-16 RX ORDER — METFORMIN HYDROCHLORIDE 500 MG/1
1000 TABLET, EXTENDED RELEASE ORAL
Status: DISCONTINUED | OUTPATIENT
Start: 2019-04-16 | End: 2019-04-16

## 2019-04-16 RX ORDER — METFORMIN HYDROCHLORIDE 500 MG/1
500 TABLET, EXTENDED RELEASE ORAL
Status: DISCONTINUED | OUTPATIENT
Start: 2019-04-16 | End: 2019-04-16

## 2019-04-16 RX ORDER — BENAZEPRIL HYDROCHLORIDE 20 MG/1
40 TABLET ORAL DAILY
Status: DISCONTINUED | OUTPATIENT
Start: 2019-04-16 | End: 2019-04-16 | Stop reason: CLARIF

## 2019-04-16 RX ORDER — SODIUM CHLORIDE 0.9 % (FLUSH) 0.9 %
10 SYRINGE (ML) INJECTION EVERY 12 HOURS SCHEDULED
Status: DISCONTINUED | OUTPATIENT
Start: 2019-04-16 | End: 2019-04-17 | Stop reason: HOSPADM

## 2019-04-16 RX ORDER — DEXTROSE MONOHYDRATE 25 G/50ML
12.5 INJECTION, SOLUTION INTRAVENOUS PRN
Status: DISCONTINUED | OUTPATIENT
Start: 2019-04-16 | End: 2019-04-17 | Stop reason: HOSPADM

## 2019-04-16 RX ORDER — ESOMEPRAZOLE MAGNESIUM 40 MG/1
40 FOR SUSPENSION ORAL DAILY
Status: DISCONTINUED | OUTPATIENT
Start: 2019-04-16 | End: 2019-04-16 | Stop reason: CLARIF

## 2019-04-16 RX ORDER — DILTIAZEM HYDROCHLORIDE 5 MG/ML
20 INJECTION INTRAVENOUS ONCE
Status: COMPLETED | OUTPATIENT
Start: 2019-04-16 | End: 2019-04-16

## 2019-04-16 RX ORDER — FOLIC ACID 1 MG/1
1 TABLET ORAL DAILY
Status: DISCONTINUED | OUTPATIENT
Start: 2019-04-16 | End: 2019-04-17 | Stop reason: HOSPADM

## 2019-04-16 RX ORDER — ONDANSETRON 2 MG/ML
4 INJECTION INTRAMUSCULAR; INTRAVENOUS EVERY 6 HOURS PRN
Status: DISCONTINUED | OUTPATIENT
Start: 2019-04-16 | End: 2019-04-17 | Stop reason: HOSPADM

## 2019-04-16 RX ORDER — LISINOPRIL 20 MG/1
40 TABLET ORAL DAILY
Status: DISCONTINUED | OUTPATIENT
Start: 2019-04-16 | End: 2019-04-17 | Stop reason: HOSPADM

## 2019-04-16 RX ADMIN — Medication 10 ML: at 10:09

## 2019-04-16 RX ADMIN — DILTIAZEM HYDROCHLORIDE 10 MG/HR: 5 INJECTION INTRAVENOUS at 15:47

## 2019-04-16 RX ADMIN — DILTIAZEM HYDROCHLORIDE 60 MG: 60 TABLET, FILM COATED ORAL at 13:24

## 2019-04-16 RX ADMIN — Medication 100 MG: at 17:24

## 2019-04-16 RX ADMIN — INSULIN LISPRO 2 UNITS: 100 INJECTION, SOLUTION INTRAVENOUS; SUBCUTANEOUS at 13:25

## 2019-04-16 RX ADMIN — DILTIAZEM HYDROCHLORIDE 20 MG: 5 INJECTION INTRAVENOUS at 06:35

## 2019-04-16 RX ADMIN — MAGNESIUM SULFATE HEPTAHYDRATE 2 G: 40 INJECTION, SOLUTION INTRAVENOUS at 06:30

## 2019-04-16 RX ADMIN — PANTOPRAZOLE SODIUM 40 MG: 40 TABLET, DELAYED RELEASE ORAL at 10:08

## 2019-04-16 RX ADMIN — ENOXAPARIN SODIUM 30 MG: 30 INJECTION SUBCUTANEOUS at 13:24

## 2019-04-16 RX ADMIN — DILTIAZEM HYDROCHLORIDE 60 MG: 60 TABLET, FILM COATED ORAL at 17:25

## 2019-04-16 RX ADMIN — ENOXAPARIN SODIUM 40 MG: 40 INJECTION SUBCUTANEOUS at 10:07

## 2019-04-16 RX ADMIN — ASPIRIN 81 MG 324 MG: 81 TABLET ORAL at 06:30

## 2019-04-16 RX ADMIN — INSULIN LISPRO 2 UNITS: 100 INJECTION, SOLUTION INTRAVENOUS; SUBCUTANEOUS at 17:25

## 2019-04-16 RX ADMIN — DILTIAZEM HYDROCHLORIDE 10 MG: 5 INJECTION INTRAVENOUS at 07:58

## 2019-04-16 RX ADMIN — DILTIAZEM HYDROCHLORIDE 5 MG/HR: 5 INJECTION INTRAVENOUS at 06:48

## 2019-04-16 RX ADMIN — LISINOPRIL 40 MG: 20 TABLET ORAL at 10:08

## 2019-04-16 RX ADMIN — FOLIC ACID 1 MG: 1 TABLET ORAL at 17:24

## 2019-04-16 RX ADMIN — INSULIN LISPRO 1 UNITS: 100 INJECTION, SOLUTION INTRAVENOUS; SUBCUTANEOUS at 21:46

## 2019-04-16 ASSESSMENT — ENCOUNTER SYMPTOMS
VOMITING: 0
WHEEZING: 0
ABDOMINAL DISTENTION: 0
SINUS PAIN: 0
SHORTNESS OF BREATH: 1
NAUSEA: 0
RECTAL PAIN: 0
EYE PAIN: 0
DIARRHEA: 0
EYE REDNESS: 0
BACK PAIN: 0
ABDOMINAL PAIN: 0
COUGH: 0

## 2019-04-16 ASSESSMENT — PAIN - FUNCTIONAL ASSESSMENT
PAIN_FUNCTIONAL_ASSESSMENT: ACTIVITIES ARE NOT PREVENTED
PAIN_FUNCTIONAL_ASSESSMENT: ACTIVITIES ARE NOT PREVENTED

## 2019-04-16 ASSESSMENT — PAIN DESCRIPTION - ORIENTATION
ORIENTATION: MID

## 2019-04-16 ASSESSMENT — PAIN DESCRIPTION - LOCATION
LOCATION: CHEST

## 2019-04-16 ASSESSMENT — PAIN SCALES - GENERAL
PAINLEVEL_OUTOF10: 4
PAINLEVEL_OUTOF10: 2
PAINLEVEL_OUTOF10: 0
PAINLEVEL_OUTOF10: 0
PAINLEVEL_OUTOF10: 1

## 2019-04-16 ASSESSMENT — PAIN DESCRIPTION - ONSET
ONSET: ON-GOING

## 2019-04-16 ASSESSMENT — PAIN DESCRIPTION - DESCRIPTORS
DESCRIPTORS: TIGHTNESS
DESCRIPTORS: DISCOMFORT

## 2019-04-16 ASSESSMENT — PAIN DESCRIPTION - PROGRESSION
CLINICAL_PROGRESSION: GRADUALLY IMPROVING
CLINICAL_PROGRESSION: GRADUALLY IMPROVING
CLINICAL_PROGRESSION: NOT CHANGED

## 2019-04-16 ASSESSMENT — PAIN DESCRIPTION - FREQUENCY
FREQUENCY: CONTINUOUS

## 2019-04-16 ASSESSMENT — PAIN DESCRIPTION - PAIN TYPE
TYPE: ACUTE PAIN

## 2019-04-16 NOTE — PROGRESS NOTES
4 Eyes Skin Assessment     The patient is being assess for  Admission    I agree that 2 RN's have performed a thorough Head to Toe Skin Assessment on the patient. ALL assessment sites listed below have been assessed. Areas assessed by both nurses: yes  [x]   Head, Face, and Ears   [x]   Shoulders, Back, and Chest  [x]   Arms, Elbows, and Hands   [x]   Coccyx, Sacrum, and IschIum  [x]   Legs, Feet, and Heels        Does the Patient have Skin Breakdown? No.  Pt has small scab to bridge of nose s/p removal of basal cell carcinoma prior to admission/bandaid in place.         Yung Prevention initiated:  No   Wound Care Orders initiated:  No      WOC nurse consulted for Pressure Injury (Stage 3,4, Unstageable, DTI, NWPT, and Complex wounds), New and Established Ostomies:  No      Nurse 1 eSignature: Electronically signed by Bharti Domínguez RN on 4/16/19 at 6:19 PM    **SHARE this note so that the co-signing nurse is able to place an eSignature**    Nurse 2 eSignature: Electronically signed by Malvin Childs RN on 4/16/19 at 6:20 PM

## 2019-04-16 NOTE — CONSULTS
History     Tobacco Use    Smoking status: Former Smoker     Packs/day: 0.50     Years: 40.00     Pack years: 20.00     Start date: 1/17/1976     Last attempt to quit: 1/17/2016     Years since quitting: 3.2    Smokeless tobacco: Never Used    Tobacco comment: smokes cigars 1-2 a week   Substance Use Topics    Alcohol use: Yes     Alcohol/week: 1.8 oz     Types: 3 Cans of beer per week     Comment: much less since dx of diabetes      No family history on file.      Review of Systems:  · Constitutional: No Fever or Weight Loss  · Eyes: No decreased vision  · ENT: No Headaches, Hearing Loss or Vertigo  · Cardiovascular:  chest pain, lightheadedness  · Respiratory: No cough or wheezing    · Gastrointestinal: No abdominal pain, appetite loss, blood in stools, constipation, diarrhea or heartburn  · Genitourinary: No dysuria, trouble voiding, or hematuria  · Musculoskeletal:  No gait disturbance, weakness or joint complaints, left knee pain  · Integumentary: No rash or pruritis  · Neurological: No TIA or stroke symptoms  · Psychiatric: No anxiety or depression  · Endocrine: No malaise, fatigue or temperature intolerance  · Hematologic/Lymphatic: No bleeding problems, blood clots or swollen lymph nodes  · Allergic/Immunologic: No nasal congestion or hives      Objective Data:     BP (!) 159/104   Pulse 121   Temp 98.3 °F (36.8 °C) (Oral)   Resp 20   Wt 213 lb 6.5 oz (96.8 kg)   SpO2 94%   BMI 31.51 kg/m²     General appearance: alert, appears stated age and cooperative  Alert, awake, oriented x 3  Eyes:  No erythema  Head: atraumatic  Neck:  no JVD  Lungs: clear to auscultation bilaterally  Heart: normal apical impulse and irregularly irregular rhythm  Abdomen: soft, non-tender; bowel sounds normal; no masses,  no organomegaly  Extremities: extremities normal, atraumatic, no cyanosis or edema  Skin: Skin color, texture, turgor normal. No rashes or lesions  Hematologic: no remarkable bruising       ECG: atrial

## 2019-04-16 NOTE — ED PROVIDER NOTES
[facility]  eMERGENCY dEPARTMENT eNCOUnter        Pt Name: Imtiaz Trent  MRN: [id number]  [place] 1955  Date of evaluation: 4/16/2019  Provider: ALEXANDRA Navarrete  PCP: Leroy Beavers MD    This patient was seen and evaluated by the attending physician Jose Shannon MD.    [address] [place] [address]       Chief Complaint   Patient presents with    Chest Pain     Presents through [facility], chest pain starting at 0430, severe, SOB with exertion denies cardiac hx       HISTORY OF PRESENT ILLNESS   (Location/Symptom, Timing/Onset, Context/Setting, Quality, Duration, Modifying Factors, Severity)  Note limiting factors. Imtiaz Trent is a 61 y.o. male with pmh of DM, HTN, who presents to the ED for evaluation of SOB, palpitations, chest pain,  PAtient reports waking up around 4 AM this morning because his contacts lens were dry and bothering him . He walked upstairs to bed and developed severe pressure chest pain. It continued for at least 30 minutes and on the drive here he developed pain into his jaw and down his left arm. He reports SOB walking from parking lot to EMS doors. EKG upon arrival Afib with RVR. No known history of Afib. No syncope. Pain rated 4/10. Heaviness, constant. The patient denies fever or chills, abdominal pain, nausea, vomiting, diarrhea. No recent travel, exposure to sick contacts, or recent antibiotics. No other acute concerns, associated symptoms or modifying factors. Nursing Notes were all reviewed and agreed with or any disagreements were addressed  in the HPI. REVIEW OF SYSTEMS    (2-9 systems for level 4, 10 or more for level 5)     Review of Systems   Constitutional: Negative for chills, fatigue and fever. Eyes: Negative for pain. Respiratory: Positive for shortness of breath. Negative for cough. Cardiovascular: Positive for chest pain and palpitations. Negative for leg swelling.    Gastrointestinal: Negative for abdominal pain, diarrhea, nausea and vomiting. Genitourinary: Negative for dysuria. Musculoskeletal: Negative for back pain, neck pain and neck stiffness. Skin: Negative for rash. Neurological: Negative for dizziness and headaches. Psychiatric/Behavioral: Negative for confusion. Positives and Pertinent negatives as per HPI. Except as noted abovein the ROS, all other systems were reviewed and negative. PAST MEDICAL HISTORY     Past Medical History:   Diagnosis Date    Arthritis     Dyslipidemia     H/O low back pain     Mild     Hypertension     Kidney stone          SURGICAL HISTORY     Past Surgical History:   Procedure Laterality Date    BLEPHAROPLASTY      Bilateral     JOINT REPLACEMENT      surgery no replacement    KNEE ARTHROSCOPY Left 2/5/2014    LEG SURGERY      x 2-Right     NECK SURGERY      herniated disc march 5, 2013    OTHER SURGICAL HISTORY      Ruptured Disc     OTHER SURGICAL HISTORY Left 07/2016    orif--left radial fx    ROTATOR CUFF REPAIR      Left x 2          CURRENTMEDICATIONS       Previous Medications    AMLODIPINE (NORVASC) 5 MG TABLET    Take 1 tablet by mouth daily    BENAZEPRIL (LOTENSIN) 40 MG TABLET    Take 1 tablet by mouth daily    BLOOD GLUCOSE MONITORING SUPPL (ONE TOUCH ULTRA 2) W/DEVICE KIT    1 kit by Other route 3 times daily    BLOOD GLUCOSE TEST STRIPS (ONE TOUCH ULTRA TEST) STRIP    TEST BLOOD SUGAR  Up to  Twice a day as needed. ESOMEPRAZOLE MAGNESIUM (NEXIUM PO)    Take by mouth    METFORMIN (GLUCOPHAGE-XR) 500 MG EXTENDED RELEASE TABLET    TAKE 1 TABLET IN THE       MORNING AND 2 TABLETS  IN  THE EVENING    ONETOUCH DELICA LANCETS FINE MISC    3 each by Does not apply route 3 times daily         ALLERGIES     Sulfa antibiotics    FAMILYHISTORY     No family history on file.        SOCIAL HISTORY       Social History     Socioeconomic History    Marital status:      Spouse name: Not on file    Number of children: 3    Years of education: Not on file    Highest education level: Not on file   Occupational History    Occupation: Tech @ [employer]      Employer: [employer] Occupation: Retired October 2014   Social Needs    Financial resource strain: Not on file    Food insecurity:     Worry: Not on file     Inability: Not on file   [place] needs:     Medical: Not on file     Non-medical: Not on file   Tobacco Use    Smoking status: Former Smoker     Packs/day: 0.50     Years: 40.00     Pack years: 20.00     Start date: 1/17/1976     Last attempt to quit: 1/17/2016     Years since quitting: 3.2    Smokeless tobacco: Never Used    Tobacco comment: smokes cigars 1-2 a week   Substance and Sexual Activity    Alcohol use: Yes     Alcohol/week: 1.8 oz     Types: 3 Cans of beer per week     Comment: much less since dx of diabetes    Drug use: No    Sexual activity: Not on file   Lifestyle    Physical activity:     Days per week: Not on file     Minutes per session: Not on file    Stress: Not on file   Relationships    Social connections:     Talks on phone: Not on file     Gets together: Not on file     Attends Orthodoxy service: Not on file     Active member of club or organization: Not on file     Attends meetings of clubs or organizations: Not on file     Relationship status: Not on file    Intimate partner violence:     Fear of current or ex partner: Not on file     Emotionally abused: Not on file     Physically abused: Not on file     Forced sexual activity: Not on file   Other Topics Concern    Not on file   Social History Narrative    Not on file       SCREENINGS             PHYSICAL EXAM    (up to 7 for level 4, 8 or more for level 5)     ED Triage Vitals [04/16/19 0605]   BP Temp Temp src Pulse Resp SpO2 Height Weight   123/78 -- -- 154 16 100 % -- 213 lb 6.5 oz (96.8 kg)       Physical Exam   Constitutional: He is oriented to person, place, and time. He appears well-developed. No distress. [address] [place] [place] [address]   Phone [phone]   D-DIMER, QUANTITATIVE    Narrative:     Performed at:  [facility]  [address] [place], [place] [address]   Phone [phone]   MAGNESIUM   POCT GLUCOSE       All other labs were within normal range or not returned as of this dictation. EKG: All EKG's are interpreted by the Emergency Department Physician who either signs orCo-signs this chart in the absence of a cardiologist.  Please see their note for interpretation of EKG. RADIOLOGY:   Non-plain film images such as CT, Ultrasound and MRI are read by the radiologist. Plain radiographic images are visualized andpreliminarily interpreted by the  ED Provider with the below findings:        Interpretation perthe Radiologist below, if available at the time of this note:    XR CHEST PORTABLE   Final Result   Minimal lingular atelectasis or pneumonitis. Follow-up to resolution   suggested. No results found.       PROCEDURES   Unless otherwise noted below, none     Procedures    CRITICAL CARE TIME   N/A    CONSULTS:  IP CONSULT TO CARDIOLOGY      EMERGENCY DEPARTMENT COURSE and DIFFERENTIALDIAGNOSIS/MDM:   Vitals:    Vitals:    04/16/19 0637 04/16/19 0648 04/16/19 0700 04/16/19 0703   BP: (!) 151/96 123/64  (!) 142/78   Pulse: 157 125 127 131   Resp: 25 20 24 18   SpO2: 93% 95% 95% 94%   Weight:           Patient was given thefollowing medications:  Medications   diltiazem injection 20 mg (20 mg Intravenous Given 4/16/19 0635)     Followed by   diltiazem 125 mg in dextrose 5 % 125 mL infusion (15 mg/hr Intravenous Rate/Dose Change 4/16/19 0734)   diltiazem injection 10 mg (has no administration in time range)   aspirin chewable tablet 324 mg (324 mg Oral Given 4/16/19 0630)   magnesium sulfate 2 g in 50 mL IVPB premix (2 g Intravenous New Bag 4/16/19 0630)       Differential Diagnosis: Acute bronchitis, Musculoskeletal chest pain, Pleurisy, Pulmonary edema, Congestive Heart Failure, Myocardial Infarction, Pulmonary Embolus, Thoracic Dissection, Pericarditis, Pericardial Effusion, Pneumonia, Pneumothorax, Boerhaave's syndrome, Ischemic Bowel, Bowel Obstruction, PUD, GERD, Acute Cholecystitis, Pancreatitis, Hepatitis, Colitis, other    Patient seen and examined today for new onset Afib with RVR. Hedy Up See [facility] [place] for patient presentation. Patient is in no acute distress, nontoxic, afebrile with unremarkable vital signs. Chest x-ray clear. Treated with diltiazem bolus followed by infusion. I have reviewed the patient's laboratory results. The patient has normal WBCs, hematocrit and platelets. They have no severe electrolyte abnormality or renal impairment. Troponin negative. EtOh 41. His Ddimer was negative, do not suspect pulmonary embolism. Discussed findings with the patient. Remained stable in no acute distress. Denies any pain at this time. On monitor A. fib ranging from 120-30s. Discussed admission with hospitalist for new onset Afib and he was agreeable with plan. Results for orders placed or performed during the hospital encounter of 04/16/19   CBC   Result Value Ref Range    WBC 10.6 4.0 - 11.0 K/uL    RBC 5.09 4. 20 - 5.90 M/uL    Hemoglobin 17.0 13.5 - 17.5 g/dL    Hematocrit 49.6 40.5 - 52.5 %    MCV 97.5 80.0 - 100.0 fL    MCH 33.3 26.0 - 34.0 pg    MCHC 34.2 31.0 - 36.0 g/dL    RDW 13.2 12.4 - 15.4 %    Platelets 748 078 - 413 K/uL    MPV 8.4 5.0 - 10.5 fL   Basic Metabolic Panel w/ Reflex to MG   Result Value Ref Range    Sodium 137 136 - 145 mmol/L    Potassium reflex Magnesium 4.7 3.5 - 5.1 mmol/L    Chloride 96 (L) 99 - 110 mmol/L    CO2 20 (L) 21 - 32 mmol/L    Anion Gap 21 (H) 3 - 16    Glucose 216 (H) 70 - 99 mg/dL    BUN 18 7 - 20 mg/dL    CREATININE 1.0 0.8 - 1.3 mg/dL    GFR Non-African American >60 >60    GFR African American >60 >60    Calcium 10.3 8.3 - 10.6 mg/dL   Ethanol   Result Value Ref Range    Ethanol Lvl 41 mg/dL   Troponin   Result Value Ref Range    Troponin <0.01 <0.01 ng/mL   Brain Natriuretic Peptide   Result Value Ref Range    Pro-BNP 22 0 - 124 pg/mL   D-Dimer, Quantitative   Result Value Ref Range    D-Dimer, Quant 218 0 - 229 ng/mL DDU   POCT Glucose   Result Value Ref Range    POC Glucose 183 (H) 70 - 99 mg/dl    Performed on ACCU-CHEK    EKG 12 Lead   Result Value Ref Range    Ventricular Rate 163 BPM    Atrial Rate 131 BPM    QRS Duration 80 ms    Q-T Interval 262 ms    QTc Calculation (Bazett) 431 ms    R Axis 47 degrees    T Axis 178 degrees    Diagnosis       Atrial fibrillation with rapid ventricular responseST & T wave abnormality, consider lateral ischemia or digitalis effectAbnormal ECGNo previous ECGs available     Consult placed to hospitalist via perfect serve for admission of his afib. Dr Kylee Slade placed admission orders. As I have deemed necessary from their history, physical, and studies, I have considered and evaluated Elizabeth Thompson for the following diagnoses:  ACUTE CORONARY SYNDROME, PERICARDIAL TAMPONADE, PNEUMOTHORAX, PULMONARY EMBOLISM, and THORACIC DISSECTION. FINAL IMPRESSION      1. New onset a-fib ([address]    2. Atrial fibrillation with RVR ([address]    3. Alcohol use    4.  Acute chest pain          DISPOSITION/PLAN   DISPOSITION        PATIENT REFERREDTO:  Clarita Kidd, [address] [facility] [address] [place] [address]  [phone]            DISCHARGE MEDICATIONS:  New Prescriptions    No medications on file       DISCONTINUED MEDICATIONS:  Discontinued Medications    No medications on file              (Please note that portions ofthis note were completed with a voice recognition program.  Efforts were made to edit the dictations but occasionally words are mis-transcribed.)    Lupe Thibodeaux (electronically signed)           ALEXANDRA Thibodeaux  04/16/19 9787

## 2019-04-16 NOTE — CARE COORDINATION
INITIAL CASE MANAGEMENT ASSESSMENT    Reviewed chart, met with patient to assess possible discharge needs. Explained Case Management role/services. Living Situation: Lives alone in his own home. ADLs:  Independent in all adl's. DME:  N/A     PT/OT Recs:  Not ordered     Active Services:   N/A     Transportation:  Self per care, which is here in the ED parking area. Medications:  No issues with getting , taking or affording medication. PCP:  Dr. Sruthi Aldrich       HD/PD:  N/A     PLAN/COMMENTS:  Patient plans to return home with no anticipated needs. Patient did give cm permission to discuss needs with daughter. SW/CM provided contact information for patient or family to call with any questions. SW/CM will follow and assist as needed.

## 2019-04-16 NOTE — H&P
Addendum to Resident H& P/Progress note/ Discharge Summary:    Patient has been seen,examined and evaluated . I have reviewed the current history, physical findings, labs and assessment and plan and agree with note as documented by resident . In addition:     Afib with RVR  - ct cardizem drip  - cardio consult  - ECHO    Chest pain  - cycle trops    Alcohol abuse continuous  - thiamine folate  -monitor for withdrawal      Amparo Packer  [facility] Hospitalist.    HISTORY AND PHYSICAL  Internal Medicine Resident PGY-2    Admit Date: 4/16/2019                                                           Code:Full Code  PCP: Erin aKn MD                                  CC: Chest pressure, palpitations    HISTORYOF PRESENT ILLNESS:   Ro Gonzalez is a 61 y.o. male with history of hypertension, DM II, alcohol abuse, and tobacco dependence who presents with chest pressure and palpitations. Patient woke up in the middle of the night to go the bathroom. When returning to his bed the chest pressure started. It was 9/10 at its peak severity and radiated to his jaw and left arm. He endorses associated racing of his heart and lightheadedness. The chest pressure lasted for about 4 hours and resolved with diltiazem. He denies back pain, nausea, vomiting, diaphoresis. Patient drove himself to the ED where he was found to be in atrial fibrillation with RVR. 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 He drinks about 5 beers (Perry light), 4-5 days per week. He denies history of alcohol withdrawal including tremors, hallucinations, seizures.        PAST HISTORY:     Past Medical History:   Diagnosis Date    Arthritis     Dyslipidemia     H/O low back pain     Mild     Hypertension     Kidney stone        Past Surgical History:   Procedure Laterality Date    BLEPHAROPLASTY      Bilateral     JOINT REPLACEMENT surgery no replacement    KNEE ARTHROSCOPY Left 2/5/2014    LEG SURGERY      x 2-Right     NECK SURGERY      herniated disc march 5, 2013    OTHER SURGICAL HISTORY      Ruptured Disc     OTHER SURGICAL HISTORY Left 07/2016    orif--left radial fx    ROTATOR CUFF REPAIR      Left x 2        Social History:   The patient lives at home, retired    Alcohol:  5 beers/day, drinks 4-5x per week. Illicit drugs: no use  Tobacco:  Current smoker, ~0.5 PPD; smokes when drinking alcohol. Family History:  No family history on file. MEDICATIONS:     No current facility-administered medications on file prior to encounter. Current Outpatient Medications on File Prior to Encounter   Medication Sig Dispense Refill    albuterol sulfate  (90 Base) MCG/ACT inhaler Inhale 2 puffs into the lungs every 6 hours as needed for Wheezing      metFORMIN (GLUCOPHAGE-XR) 500 MG extended release tablet TAKE 1 TABLET IN THE       MORNING AND 2 TABLETS  IN  THE EVENING 90 tablet 0    blood glucose test strips (ONE TOUCH ULTRA TEST) strip TEST BLOOD SUGAR  Up to  Twice a day as needed.  100 strip 3    amLODIPine (NORVASC) 5 MG tablet Take 1 tablet by mouth daily 90 tablet 3    benazepril (LOTENSIN) 40 MG tablet Take 1 tablet by mouth daily 90 tablet 2    ONETOUCH DELICA LANCETS FINE MISC 3 each by Does not apply route 3 times daily 300 each 3    Blood Glucose Monitoring Suppl (ONE TOUCH ULTRA 2) w/Device KIT 1 kit by Other route 3 times daily 1 kit 3    Esomeprazole Magnesium (NEXIUM PO) Take by mouth           Scheduled Meds:   sodium chloride flush  10 mL Intravenous 2 times per day    enoxaparin  40 mg Subcutaneous Daily    insulin lispro  0-12 Units Subcutaneous TID WC    insulin lispro  0-6 Units Subcutaneous Nightly    lisinopril  40 mg Oral Daily    pantoprazole  40 mg Oral QAM AC    metFORMIN  500 mg Oral Daily with breakfast    metFORMIN  1,000 mg Oral Dinner      Continuous Infusions:   diltiazem (CARDIZEM) 125 mg in dextrose 5% 125 mL infusion 15 mg/hr (04/16/19 4246)    dextrose       PRN Meds:sodium chloride flush, magnesium hydroxide, ondansetron, glucose, dextrose, glucagon (rDNA), dextrose    Allergies: Allergies   Allergen Reactions    Sulfa Antibiotics        REVIEW OF SYSTEMS:       History obtained from chart review and the patient    Review of Systems   Constitutional: Negative for appetite change, chills, diaphoresis, fatigue and fever. HENT: Negative for congestion and sinus pain. Eyes: Negative for pain and redness. Respiratory: Positive for shortness of breath. Negative for cough and wheezing. Cardiovascular: Positive for chest pain and palpitations. Negative for leg swelling. Gastrointestinal: Negative for abdominal distention, abdominal pain, nausea, rectal pain and vomiting. Genitourinary: Negative for difficulty urinating, dysuria and hematuria. Musculoskeletal: Negative for back pain, gait problem, myalgias and neck pain. Neurological: Positive for light-headedness. Negative for dizziness, tremors, seizures, syncope, speech difficulty, weakness, numbness and headaches. Psychiatric/Behavioral: Negative for agitation and confusion. PHYSICAL EXAM:       Vitals: BP (!) 159/104   Pulse 121   Temp 98.3 °F (36.8 °C) (Oral)   Resp 20   Wt 213 lb 6.5 oz (96.8 kg)   SpO2 94%   BMI 31.51 kg/m²     PhysicalExamination:     Physical Exam   Constitutional: He is oriented to person, place, and time. No distress. HENT:   Head: Normocephalic and atraumatic. Eyes: Pupils are equal, round, and reactive to light. EOM are normal.   Neck: Normal range of motion. Neck supple. Cardiovascular: An irregularly irregular rhythm present. Tachycardia present. Pulmonary/Chest: Effort normal and breath sounds normal. No respiratory distress. He has no wheezes. He has no rales. He exhibits no tenderness. Abdominal: Soft. Bowel sounds are normal. He exhibits no distension. MD Amparo.   -----------------------------  Moustapha Mccall MD  Internal Medicine, PGY-2  Contact via [employer]  11:45 AM

## 2019-04-17 VITALS
TEMPERATURE: 98 F | WEIGHT: 216.49 LBS | HEART RATE: 68 BPM | BODY MASS INDEX: 30.99 KG/M2 | DIASTOLIC BLOOD PRESSURE: 76 MMHG | RESPIRATION RATE: 18 BRPM | HEIGHT: 70 IN | OXYGEN SATURATION: 91 % | SYSTOLIC BLOOD PRESSURE: 109 MMHG

## 2019-04-17 LAB
ALBUMIN SERPL-MCNC: 3.8 G/DL (ref 3.4–5)
ALP BLD-CCNC: 70 U/L (ref 40–129)
ALT SERPL-CCNC: 19 U/L (ref 10–40)
ANION GAP SERPL CALCULATED.3IONS-SCNC: 13 MMOL/L (ref 3–16)
AST SERPL-CCNC: 16 U/L (ref 15–37)
BILIRUB SERPL-MCNC: 0.4 MG/DL (ref 0–1)
BILIRUBIN DIRECT: <0.2 MG/DL (ref 0–0.3)
BILIRUBIN, INDIRECT: ABNORMAL MG/DL (ref 0–1)
BUN BLDV-MCNC: 23 MG/DL (ref 7–20)
CALCIUM SERPL-MCNC: 8.4 MG/DL (ref 8.3–10.6)
CHLORIDE BLD-SCNC: 101 MMOL/L (ref 99–110)
CHOLESTEROL, FASTING: 141 MG/DL (ref 0–199)
CO2: 24 MMOL/L (ref 21–32)
CREAT SERPL-MCNC: 0.8 MG/DL (ref 0.8–1.3)
GFR AFRICAN AMERICAN: >60
GFR NON-AFRICAN AMERICAN: >60
GLUCOSE BLD-MCNC: 142 MG/DL (ref 70–99)
GLUCOSE BLD-MCNC: 150 MG/DL (ref 70–99)
GLUCOSE BLD-MCNC: 236 MG/DL (ref 70–99)
HCT VFR BLD CALC: 42.1 % (ref 40.5–52.5)
HDLC SERPL-MCNC: 43 MG/DL (ref 40–60)
HEMOGLOBIN: 14.3 G/DL (ref 13.5–17.5)
LDL CHOLESTEROL CALCULATED: 67 MG/DL
LV EF: 60 %
LV EF: 76 %
LVEF MODALITY: NORMAL
LVEF MODALITY: NORMAL
MAGNESIUM: 1.9 MG/DL (ref 1.8–2.4)
MCH RBC QN AUTO: 33.4 PG (ref 26–34)
MCHC RBC AUTO-ENTMCNC: 33.9 G/DL (ref 31–36)
MCV RBC AUTO: 98.3 FL (ref 80–100)
PDW BLD-RTO: 13.3 % (ref 12.4–15.4)
PERFORMED ON: ABNORMAL
PERFORMED ON: ABNORMAL
PLATELET # BLD: 218 K/UL (ref 135–450)
PMV BLD AUTO: 8.4 FL (ref 5–10.5)
POTASSIUM SERPL-SCNC: 4.2 MMOL/L (ref 3.5–5.1)
RBC # BLD: 4.28 M/UL (ref 4.2–5.9)
SODIUM BLD-SCNC: 138 MMOL/L (ref 136–145)
TOTAL PROTEIN: 6 G/DL (ref 6.4–8.2)
TRIGLYCERIDE, FASTING: 155 MG/DL (ref 0–150)
VLDLC SERPL CALC-MCNC: 31 MG/DL
WBC # BLD: 8.2 K/UL (ref 4–11)

## 2019-04-17 PROCEDURE — 3430000000 HC RX DIAGNOSTIC RADIOPHARMACEUTICAL: Performed by: INTERNAL MEDICINE

## 2019-04-17 PROCEDURE — 94760 N-INVAS EAR/PLS OXIMETRY 1: CPT

## 2019-04-17 PROCEDURE — 85027 COMPLETE CBC AUTOMATED: CPT

## 2019-04-17 PROCEDURE — 6370000000 HC RX 637 (ALT 250 FOR IP): Performed by: NURSE PRACTITIONER

## 2019-04-17 PROCEDURE — 2580000003 HC RX 258: Performed by: INTERNAL MEDICINE

## 2019-04-17 PROCEDURE — 80076 HEPATIC FUNCTION PANEL: CPT

## 2019-04-17 PROCEDURE — 36415 COLL VENOUS BLD VENIPUNCTURE: CPT

## 2019-04-17 PROCEDURE — 6370000000 HC RX 637 (ALT 250 FOR IP): Performed by: STUDENT IN AN ORGANIZED HEALTH CARE EDUCATION/TRAINING PROGRAM

## 2019-04-17 PROCEDURE — 6370000000 HC RX 637 (ALT 250 FOR IP): Performed by: INTERNAL MEDICINE

## 2019-04-17 PROCEDURE — 80048 BASIC METABOLIC PNL TOTAL CA: CPT

## 2019-04-17 PROCEDURE — 83735 ASSAY OF MAGNESIUM: CPT

## 2019-04-17 PROCEDURE — 80061 LIPID PANEL: CPT

## 2019-04-17 PROCEDURE — 93017 CV STRESS TEST TRACING ONLY: CPT

## 2019-04-17 PROCEDURE — 99232 SBSQ HOSP IP/OBS MODERATE 35: CPT | Performed by: NURSE PRACTITIONER

## 2019-04-17 PROCEDURE — A9502 TC99M TETROFOSMIN: HCPCS | Performed by: INTERNAL MEDICINE

## 2019-04-17 PROCEDURE — 93306 TTE W/DOPPLER COMPLETE: CPT

## 2019-04-17 PROCEDURE — 78452 HT MUSCLE IMAGE SPECT MULT: CPT

## 2019-04-17 RX ORDER — DILTIAZEM HYDROCHLORIDE 240 MG/1
240 CAPSULE, COATED, EXTENDED RELEASE ORAL DAILY
Qty: 60 CAPSULE | Refills: 1 | Status: SHIPPED | OUTPATIENT
Start: 2019-04-17 | End: 2019-08-06 | Stop reason: SDUPTHER

## 2019-04-17 RX ORDER — DILTIAZEM HYDROCHLORIDE 240 MG/1
240 CAPSULE, COATED, EXTENDED RELEASE ORAL DAILY
Status: DISCONTINUED | OUTPATIENT
Start: 2019-04-17 | End: 2019-04-17 | Stop reason: HOSPADM

## 2019-04-17 RX ORDER — DILTIAZEM HYDROCHLORIDE 240 MG/1
240 CAPSULE, COATED, EXTENDED RELEASE ORAL DAILY
Qty: 30 CAPSULE | Refills: 3 | Status: SHIPPED | OUTPATIENT
Start: 2019-04-17 | End: 2019-04-17

## 2019-04-17 RX ADMIN — Medication 100 MG: at 11:11

## 2019-04-17 RX ADMIN — TETROFOSMIN 10 MILLICURIE: 0.23 INJECTION, POWDER, LYOPHILIZED, FOR SOLUTION INTRAVENOUS at 08:27

## 2019-04-17 RX ADMIN — DILTIAZEM HYDROCHLORIDE 60 MG: 60 TABLET, FILM COATED ORAL at 06:02

## 2019-04-17 RX ADMIN — PANTOPRAZOLE SODIUM 40 MG: 40 TABLET, DELAYED RELEASE ORAL at 06:02

## 2019-04-17 RX ADMIN — DILTIAZEM HYDROCHLORIDE 60 MG: 60 TABLET, FILM COATED ORAL at 00:57

## 2019-04-17 RX ADMIN — FOLIC ACID 1 MG: 1 TABLET ORAL at 11:11

## 2019-04-17 RX ADMIN — Medication 10 ML: at 11:12

## 2019-04-17 RX ADMIN — TETROFOSMIN 30 MILLICURIE: 0.23 INJECTION, POWDER, LYOPHILIZED, FOR SOLUTION INTRAVENOUS at 09:59

## 2019-04-17 RX ADMIN — DILTIAZEM HYDROCHLORIDE 60 MG: 60 TABLET, FILM COATED ORAL at 12:30

## 2019-04-17 RX ADMIN — LISINOPRIL 40 MG: 20 TABLET ORAL at 11:11

## 2019-04-17 RX ADMIN — INSULIN LISPRO 2 UNITS: 100 INJECTION, SOLUTION INTRAVENOUS; SUBCUTANEOUS at 11:12

## 2019-04-17 RX ADMIN — DILTIAZEM HYDROCHLORIDE 240 MG: 240 CAPSULE, COATED, EXTENDED RELEASE ORAL at 17:20

## 2019-04-17 ASSESSMENT — PAIN SCALES - GENERAL: PAINLEVEL_OUTOF10: 0

## 2019-04-17 NOTE — PLAN OF CARE
Problem:  Activity:  Goal: Ability to tolerate increased activity will improve  Description  Ability to tolerate increased activity will improve  Outcome: Ongoing  Goal: Expression of feelings of increased energy will increase  Description  Expression of feelings of increased energy will increase  Outcome: Ongoing     Problem: Cardiac:  Goal: Ability to maintain an adequate cardiac output will improve  Description  Ability to maintain an adequate cardiac output will improve  Outcome: Ongoing  Goal: Complications related to the disease process, condition or treatment will be avoided or minimized  Description  Complications related to the disease process, condition or treatment will be avoided or minimized  Outcome: Ongoing     Problem: Coping:  Goal: Level of anxiety will decrease  Description  Level of anxiety will decrease  Outcome: Ongoing  Goal: General experience of comfort will improve  Description  General experience of comfort will improve  Outcome: Ongoing     Problem: Safety:  Goal: Ability to remain free from injury will improve  Description  Ability to remain free from injury will improve  Outcome: Ongoing  Goal: Will show no signs and symptoms of excessive bleeding  Description  Will show no signs and symptoms of excessive bleeding  Outcome: Ongoing

## 2019-04-17 NOTE — DISCHARGE SUMMARY
Addendum to Resident H& P/Progress note/ Discharge Summary:    Patient has been seen,examined and evaluated . I have reviewed the current history, physical findings, labs and assessment and plan and agree with note as documented by resident . In addition:   Afib with RVR- now in sinus  - dc on Cardizem and eliquis  - ECHO pending on dc, needs to FU with cardiology as an out patient. Birdie, [address] [place] [address] SUMMARY    Patient ID: Ro Gonzalez                                             Discharge Date: 4/17/2019   Patient's PCP: Erin Kan MD                                          Discharge Physician: Greg Cabrera MD  Admit Date: 4/16/2019   Admitting Physician: Herminio Meyer MD    PROBLEMS DURING HOSPITALIZATION:  Present on Admission:   [facility]   [facility])   Alcohol use   PAF (paroxysmal atrial fibrillation) ([facility]  Atrial fibrillation with RVR, new onset  Alcohol abuse with dependence  Tobacco dependence  Atypical chest pain    DISCHARGE DIAGNOSES:  Atrial fibrillation with RVR, resolved   Spontaneous conversion to NSR. Paroxysmal atrial fibrillation. Alcohol abuse, no signs of withdrawal  Tobacco dependence, counseled  Atypical chest pain  Low-risk exercise stress test.    HPI:  Ro Gonzalez is a 61 y.o. male with history of hypertension, DM II, alcohol abuse, and tobacco dependence who presents with chest pressure and palpitations. Patient woke up in the middle of the night to go the bathroom. When returning to his bed the chest pressure started. It was 9/10 at its peak severity and radiated to his jaw and left arm. He endorses associated racing of his heart and lightheadedness. The chest pressure lasted for about 4 hours and resolved with diltiazem. He denies back pain, nausea, vomiting, diaphoresis.  Patient drove himself to the ED where he was found to be in atrial fibrillation with RVR. 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 He drinks about 5 beers (Perry light), 4-5 days per week. He denies history of alcohol withdrawal including tremors, hallucinations, seizures. The following issues were addressed during hospitalization:  Paroxysmal atrial fibrillation. CHADS-VASc = 2 (HTN, DM). New onset atrial fibrillation with RVR. Symptomatic with chest pressure, SOB, palpitations. Cardiology consulted and patient started on diltiazem gtt with successful control of HR, spontaneous conversion to normal sinus rhythm. Patient transitioned to oral diltiazem. Patient given therapeutic lovenox which was switched to eliquis on discharge. Patient counseled on importance of reducing alcohol consumption. Follow up outpatient with Cardiology for further management of atrial fibrillation and for results of Echo. Add PO diltiazem  mg daily. Add PO eliquis 5 mg BID. Atypical chest pain  Chest pressure lasting >1 hour. Resolved with diltiazem gtt and control of RVR. EKG no acute ischemia, troponin negative. Patient had exercise stress test which returned negative. Echo completed in hospital and patient will follow up outpatient for results.      Essential hypertension   Lisinopril substituted for home benazepril and norvasc held during hospitalization. On discharge will continue home Benazepril 40 mg daily and discontinue home Norvasc.      DM II (A1c 6.2% in Feb 2019)  Managed with medium sliding scale insulin in hospital. Home metformin resumed on discharge. Alcohol abuse, no signs of withdrawal  Drinks ~5 beers, 4-5 times per week. Denies history of withdrawal including tremors, hallucinations, seizures. Patient given oral thiamine and folic acid during hospitalization.  No signs of withdrawal in hospital. Patient instructed to refrain from alcohol use.      Tobacco dependence known as:  CARDIZEM CD  Take 1 capsule by mouth daily        CONTINUE taking these medications    albuterol sulfate  (90 Base) MCG/ACT inhaler     benazepril 40 MG tablet  Commonly known as:  LOTENSIN  Take 1 tablet by mouth daily     blood glucose test strips strip  Commonly known as:  ONE TOUCH ULTRA TEST  TEST BLOOD SUGAR  Up to  Twice a day as needed. metFORMIN 500 MG extended release tablet  Commonly known as:  GLUCOPHAGE-XR  TAKE 1 TABLET IN THE       MORNING AND 2 TABLETS  IN  THE EVENING     NEXIUM PO     ONE TOUCH ULTRA 2 w/Device Kit  1 kit by Other route 3 times daily     ONETOUCH DELICA LANCETS FINE Misc  3 each by Does not apply route 3 times daily        STOP taking these medications    amLODIPine 5 MG tablet  Commonly known as:  NORVASC           Where to Get Your Medications      These medications were sent to [facility], [facility].  [address]  [address] [place] [address]    Phone:  [phone]   · diltiazem 240 MG extended release capsule     These medications were sent to [address] [place] [address] & [place] [address], [address] [place] [address]    Phone:  [phone]   · apixaban 5 MG Tabs tablet       Activity: activity as tolerated  Diet: cardiac diet Low fat/low sodium diet and regular exercise encouraged  Wound Care: none needed    Time Spent on discharge is more than 30 minutes    Signed:  Laxmi Lagos MD  Internal Medicine, PGY-2  4/17/2019

## 2019-04-17 NOTE — PROGRESS NOTES
[facility]   Daily Progress Note      Admit Date:  4/16/2019    Reason for follow up visit: Atrial fib    CC: \"I'm fine. Can I go home. \"    62 y/o male followed by [facility] presented with chest pain radiating to the jaw that lasted over 1 hour and presented to ED and noted to be in atrial fib with rapid rate. He was placed on IV cardizem. Interval History:  Converted to SR and now off diltiazem  BP stable. Denies further chest pain, SOB, cough, palpitations or dizziness  Stress nuclear negative for ischemia  Now on po Diltiazem  Lovenox changed to Eliquis    Subjective:  Pt with no acute overnight cardiac events. Review of Systems:   · Constitutional: no unanticipated weight loss. There's been no change in energy level, sleep pattern, or activity level. No fevers, chills. · Eyes: No visual changes or diplopia. No scleral icterus. · ENT: No headaches, hearing loss or vertigo. No mouth sores or sore throat. · Cardiovascular: as reviewed in HPI  · Respiratory: No cough or wheezing, no sputum production. No hematemesis. · Gastrointestinal: No abdominal pain, appetite loss, blood in stools. No change in bowel or bladder habits. · Genitourinary: No dysuria, trouble voiding, or hematuria. · Musculoskeletal:  No gait disturbance, no joint complaints. · Integumentary: No rash or pruritis. · Neurological: No headache, diplopia, change in muscle strength, numbness or tingling. · Psychiatric: No anxiety or depression. · Endocrine: No temperature intolerance. No excessive thirst, fluid intake, or urination. No tremor. · Hematologic/Lymphatic: No abnormal bruising or bleeding, blood clots or swollen lymph nodes. · Allergic/Immunologic: No nasal congestion or hives.     Objective:   /76   Pulse 68   Temp 98 °F (36.7 °C) (Oral)   Resp 18   Ht 5' 10\" (1.778 m)   Wt 216 lb 7.9 oz (98.2 kg)   SpO2 91%   BMI 31.06 kg/m²       Intake/Output Summary (Last 24 hours) at 4/17/2019 [address] [place] [address] filed at 4/17/2019 8047  Gross per 24 hour   Intake 438 ml   Output --   Net 438 ml     Wt Readings from Last 3 Encounters:   04/17/19 216 lb 7.9 oz (98.2 kg)   02/25/19 213 lb (96.6 kg)   08/20/18 205 lb (93 kg)       Physical Exam:  General: In no acute distress. Awake, alert, and oriented X4. Ambulating without complaints. Skin:  Warm and dry. No new appearing rashes or lesions. Neck:  Supple. No JVD or carotid bruit appreciated. Chest: Lungs clear to auscultation. No wheezes/rhonchi/rales  Cardiovascular:  RRR. Normal S1 and S2. No murmur/gallop or rub  Abdomen:  soft, nontender, nondistended, +bowel sounds. Extremities:  No LE edema. No clubbing or cyanosis. 2+ bilateral radial/DP/PT pulses. Cap refill brisk    Medications:    sodium chloride flush  10 mL Intravenous 2 times per day    insulin lispro  0-12 Units Subcutaneous TID WC    insulin lispro  0-6 Units Subcutaneous Nightly    lisinopril  40 mg Oral Daily    pantoprazole  40 mg Oral QAM AC    diltiazem  60 mg Oral 4 times per day    enoxaparin  1 mg/kg Subcutaneous Daily    thiamine  100 mg Oral Daily    folic acid  1 mg Oral Daily      dextrose       sodium chloride flush, magnesium hydroxide, ondansetron, glucose, dextrose, glucagon (rDNA), dextrose    Lab Data:  CBC:   Recent Labs     04/16/19  0615 04/17/19  0416   WBC 10.6 8.2   HGB 17.0 14.3    218     BMP:    Recent Labs     04/16/19  0615 04/17/19  0416    138   K 4.7 4.2   CO2 20* 24   BUN 18 23*   CREATININE 1.0 0.8     LIVR:   Recent Labs     04/17/19  0416   AST 16   ALT 19     Results for Alyson Foreman (MRN [id number]) as of 4/17/2019 14:56   Ref. Range 4/16/2019 06:15 4/16/2019 12:21   Troponin Latest Ref Range: <0.01 ng/mL <0.01 <0.01     Results for Alyson Foreman (MRN [id number]) as of 4/17/2019 14:56   Ref.  Range 4/17/2019 04:16   Cholesterol, Fasting Latest Ref Range: 0 - 199 mg/dL 141   HDL Cholesterol Latest Ref Range: 40 - 60 mg/dL 43   LDL Calculated Latest Ref Range: <100 mg/dL 67   Triglyceride, Fasting Latest Ref Range: 0 - 150 mg/dL 155 (H)   VLDL Cholesterol Calculated Latest Ref Range: Not Established mg/dL 31     ECG: Atrial fib with RVR; ; nonspecific STT changes    4/17/2019: Stress Myoview  Summary    Normal myocardial perfusion study.    Normal LV size and systolic function.        No ischemic ECG changes with level of exertion achieved (83%).    Good exercise capacity.        Overall findings represent a low risk study.         Echo: pending    Telemetry: Atrial fib and now in SR and maintaining    Assessment/Plan:    1. Paroxysmal atrial fib  -new onset  -presented with RVR  -now in SR after IV diltiazem  -on po Diltiazem  -Eliquis added (defer long term use to primary cardiology provider)    2. Essential HTN  -controlled  -continue medical management    3. ETOH use  -discussed decreasing consumption    OK for discharge today from cardiac standpoint    If echo not completed, can follow up as outpt. Follow up with cardiology: DARYL Yeh CNP in [facility] as scheduled in May 14, 2019    Low fat/low sodium diet and regular exercise encouraged    Discharge Meds:  Eliquis 5 mg BID  Diltiazem  mg daily  Benazepril 40 mg daily  (Med Rec for cardiology has been completed)      DO NOT RESUME AMLODIPINE      Electronically signed by GAL Metzger CNP on 4/17/2019 at 2:19 PM

## 2019-04-17 NOTE — PROGRESS NOTES
Addendum to Resident H& P/Progress note/ Discharge Summary:    Patient has been seen,examined and evaluated . I have reviewed the current history, physical findings, labs and assessment and plan and agree with note as documented by resident . In addition:     Afib with RVR with CP   - now in sinus  - dc drip   = on Lovenox> Eliquis if ECHO and stress is ok     Birdie, [address]      Internal Medicine Department  Progress Note    Admit Date: 4/16/2019      Chief Complaint: chest pressure, palpitations    Interval History: Diltiazem gtt transitioned to oral diltiazem. Telemetry reviewed, pt in sinus rhythm with HR down to the 40's. Patient asymptomatic. Exercise stress test this AM.     Hospital Course: Patient seen following exercise stress test. He is feeling well and has no complaints. Denies chest pain, palpitations, lightheadedness, SOB, diaphoresis, nausea, vomiting.      Scheduled Medications:    sodium chloride flush  10 mL Intravenous 2 times per day    insulin lispro  0-12 Units Subcutaneous TID WC    insulin lispro  0-6 Units Subcutaneous Nightly    lisinopril  40 mg Oral Daily    pantoprazole  40 mg Oral QAM AC    diltiazem  60 mg Oral 4 times per day    enoxaparin  1 mg/kg Subcutaneous Daily    thiamine  100 mg Oral Daily    folic acid  1 mg Oral Daily      PRN Medications: sodium chloride flush, magnesium hydroxide, ondansetron, glucose, dextrose, glucagon (rDNA), dextrose    Continuous Infusions:   diltiazem (CARDIZEM) 125 mg in dextrose 5% 125 mL infusion Stopped (04/16/19 2215)    dextrose         PHYSICAL EXAM:  /80   Pulse 57   Temp 97.6 °F (36.4 °C) (Oral)   Resp 17   Ht 5' 10\" (1.778 m)   Wt 216 lb 7.9 oz (98.2 kg)   SpO2 92%   BMI 31.06 kg/m²   Recent Labs     04/16/19  0610 04/16/19  1147 04/16/19  1704 04/16/19  2053 04/17/19  0726   POCGLU 183* 171* 181* 176* 142*       Intake/Output Summary (Last 24 hours) at 4/17/2019 1041  Last data filed at 4/17/2019 2043  Gross per 24 hour   Intake 438 ml   Output --   Net 438 ml       Physical Exam   Constitutional: He is oriented to person, place, and time. No distress. HENT:   Head: Normocephalic and atraumatic. Eyes: Pupils are equal, round, and reactive to light. EOM are normal.   Neck: Normal range of motion. Neck supple. Cardiovascular: Normal rate, regular rhythm, normal heart sounds and intact distal pulses. Pulmonary/Chest: Effort normal and breath sounds normal. No stridor. No respiratory distress. He has no wheezes. He has no rales. Abdominal: Soft. Bowel sounds are normal. He exhibits no distension. There is no tenderness. Musculoskeletal: Normal range of motion. He exhibits no edema or tenderness. Neurological: He is alert and oriented to person, place, and time. He displays normal reflexes. No cranial nerve deficit. He exhibits normal muscle tone. Coordination normal.   Skin: Skin is warm and dry. He is not diaphoretic. LABS:  Recent Labs     04/16/19  0615 04/17/19  0416   WBC 10.6 8.2   HGB 17.0 14.3   HCT 49.6 42.1    218                                                                    Recent Labs     04/16/19  0615 04/17/19  0416    138   K 4.7 4.2   CL 96* 101   CO2 20* 24   BUN 18 23*   CREATININE 1.0 0.8   GLUCOSE 216* 150*     Recent Labs     04/17/19  0416   AST 16   ALT 19   BILITOT 0.4   ALKPHOS 70     Recent Labs     04/16/19  0615 04/16/19  1221   TROPONINI <0.01 <0.01     No results for input(s): BNP in the last 72 hours. Recent Labs     04/17/19  0416   HDL 43     No results for input(s): INR in the last 72 hours. Assessment & Plan:    Benjy Nevarez is a 61 y.o. male, who was admitted with chest pressure and palpitations, found to have new onset atrial fibrillation with RVR.      Atrial fibrillation with RVR, new onset  SOB, CP, palpitations. Cardiology consulted. Transitioned from diltiazem gtt to PO. Cont PO diltiazem 60 mg q6h.

## 2019-04-17 NOTE — CARE COORDINATION
Received a call from [employer] with [employer] for discharge planning purposes if needed.    Her direct line is [phone]   Electronically signed by Sophia Mello RN on 4/17/2019 at 4:47 PM

## 2019-04-17 NOTE — ED PROVIDER NOTES
Triage Chief Complaint:   Chest Pain ([facility], chest pain starting at 0430, severe, SOB with exertion denies cardiac hx)    [place]:  Earl Schilder is a 61 y.o. male that presents with chest pain, short of breath, dyspnea on exertion, palpitations, and anxiety. Patient with no prior history of cardiac disease. He does drink daily. No prior history of A. fib. No history DVT or PE. No lower extremity edema. Symptoms started at 4:30 this morning and have continued without change. There is nothing at home that made the symptoms better. Any sort of ambulation makes the symptoms worse.   Pain is described as crushing chest pain    ROS:  General:  No fevers, no chills, no weakness  Eyes:  No recent vison changes, no discharge  ENT:  No sore throat, no nasal congestion, no hearing changes  Cardiovascular:  As above  Respiratory:  As above  Gastrointestinal:  No pain, no nausea, no vomiting, no diarrhea  Musculoskeletal:  No muscle pain, no joint pain  Skin:  No rash, no pruritis, no easy bruising  Neurologic:  No speech problems, no headache, no extremity numbness, no extremity tingling, no extremity weakness  Psychiatric:  No anxiety, no hallucinations or delusions, no suicidal or homicidal ideation  Genitourinary:  No dysuria, no hematuria  Endocrine:  No unexpected weight gain, no unexpected weight loss  Extremities:  no edema, no pain    Past Medical History:   Diagnosis Date    Arthritis     Dyslipidemia     H/O low back pain     Mild     Hypertension     Kidney stone      Past Surgical History:   Procedure Laterality Date    BLEPHAROPLASTY      Bilateral     JOINT REPLACEMENT      surgery no replacement    KNEE ARTHROSCOPY Left 2/5/2014    LEG SURGERY      x 2-Right     NECK SURGERY      herniated disc march 5, 2013    OTHER SURGICAL HISTORY      Ruptured Disc     OTHER SURGICAL HISTORY Left 07/2016    orif--left radial fx    ROTATOR CUFF REPAIR      Left x 2      No family history on file. Social History     Socioeconomic History    Marital status:      Spouse name: Not on file    Number of children: 3    Years of education: Not on file    Highest education level: Not on file   Occupational History    Occupation: Tech @ [employer]      Employer: [employer] Occupation: Retired October 2014   Social Needs    Financial resource strain: Not on file    Food insecurity:     Worry: Not on file     Inability: Not on file   Tremor Video needs:     Medical: Not on file     Non-medical: Not on file   Tobacco Use    Smoking status: Former Smoker     Packs/day: 0.50     Years: 40.00     Pack years: 20.00     Start date: 1/17/1976     Last attempt to quit: 1/17/2016     Years since quitting: 3.2    Smokeless tobacco: Never Used    Tobacco comment: smokes cigars 1-2 a week   Substance and Sexual Activity    Alcohol use:  Yes     Alcohol/week: 1.8 oz     Types: 3 Cans of beer per week     Comment: much less since dx of diabetes    Drug use: No    Sexual activity: Not on file   Lifestyle    Physical activity:     Days per week: Not on file     Minutes per session: Not on file    Stress: Not on file   Relationships    Social connections:     Talks on phone: Not on file     Gets together: Not on file     Attends Nondenominational service: Not on file     Active member of club or organization: Not on file     Attends meetings of clubs or organizations: Not on file     Relationship status: Not on file    Intimate partner violence:     Fear of current or ex partner: Not on file     Emotionally abused: Not on file     Physically abused: Not on file     Forced sexual activity: Not on file   Other Topics Concern    Not on file   Social History Narrative    Not on file     Current Facility-Administered Medications   Medication Dose Route Frequency Provider Last Rate Last Dose    diltiazem 125 mg in dextrose 5 % 125 mL infusion  5 mg/hr Intravenous Continuous Veronica Lorenzo MD Lory   Stopped at 04/16/19 2215    sodium chloride flush 0.9 % injection 10 mL  10 mL Intravenous 2 times per day Birdie Christensen MD   10 mL at 04/16/19 1009    sodium chloride flush 0.9 % injection 10 mL  10 mL Intravenous PRN Birdie Christensen MD        magnesium hydroxide (MILK OF MAGNESIA) 400 MG/5ML suspension 30 mL  30 mL Oral Daily PRN Birdie Christensen MD        ondansetron (ZOFRAN) injection 4 mg  4 mg Intravenous Q6H PRN Birdie Christensen MD        insulin lispro (HUMALOG) injection vial 0-12 Units  0-12 Units Subcutaneous TID WC Birdie Christensen MD   2 Units at 04/16/19 1725    insulin lispro (HUMALOG) injection vial 0-6 Units  0-6 Units Subcutaneous Nightly Birdie Christensen MD   1 Units at 04/16/19 2146    lisinopril (PRINIVIL;ZESTRIL) tablet 40 mg  40 mg Oral Daily Birdie Christensen MD   40 mg at 04/16/19 1008    pantoprazole (PROTONIX) tablet 40 mg  40 mg Oral QAM AC Birdie Christensen MD   40 mg at 04/17/19 0602    glucose (GLUTOSE) 40 % oral gel 15 g  15 g Oral PRN Birdie Christensen MD        dextrose 50 % solution 12.5 g  12.5 g Intravenous PRN Birdie Christensen MD        glucagon (rDNA) injection 1 mg  1 mg Intramuscular PRN Birdie Christensen MD        dextrose 5 % solution  100 mL/hr Intravenous PRN Birdie Christensen MD        diltiazem (CARDIZEM) tablet 60 mg  60 mg Oral 4 times per day Keke Cisse MD   60 mg at 04/17/19 0602    enoxaparin (LOVENOX) injection 100 mg  1 mg/kg Subcutaneous Daily Keke Cisse MD        vitamin B-1 (THIAMINE) tablet 100 mg  100 mg Oral Daily Brooke Kumar MD   100 mg at 39/29/07 9972    folic acid (FOLVITE) tablet 1 mg  1 mg Oral Daily Brooke Kumar MD   1 mg at 04/16/19 1724     Allergies   Allergen Reactions    Sulfa Antibiotics        Nursing Notes Reviewed    Physical Exam:  ED Triage Vitals   Enc Vitals Group      BP 04/16/19 0605 123/78      Pulse 04/16/19 0605 154      Resp VLDL Cholesterol Calculated 31 Not Established mg/dL   Hepatic Function Panel   Result Value Ref Range    Total Protein 6.0 (L) 6.4 - 8.2 g/dL    Alb 3.8 3.4 - 5.0 g/dL    Alkaline Phosphatase 70 40 - 129 U/L    ALT 19 10 - 40 U/L    AST 16 15 - 37 U/L    Total Bilirubin 0.4 0.0 - 1.0 mg/dL    Bilirubin, Direct <0.2 0.0 - 0.3 mg/dL    Bilirubin, Indirect see below 0.0 - 1.0 mg/dL   POCT Glucose   Result Value Ref Range    POC Glucose 183 (H) 70 - 99 mg/dl    Performed on ACCU-CHEK    POCT Glucose   Result Value Ref Range    POC Glucose 171 (H) 70 - 99 mg/dl    Performed on ACCU-CHEK    POCT Glucose   Result Value Ref Range    POC Glucose 181 (H) 70 - 99 mg/dl    Performed on ACCU-CHEK    POCT Glucose   Result Value Ref Range    POC Glucose 176 (H) 70 - 99 mg/dl    Performed on ACCU-CHEK    POCT Glucose   Result Value Ref Range    POC Glucose 142 (H) 70 - 99 mg/dl    Performed on ACCU-CHEK    EKG 12 Lead   Result Value Ref Range    Ventricular Rate 163 BPM    Atrial Rate 131 BPM    QRS Duration 80 ms    Q-T Interval 262 ms    QTc Calculation (Bazett) 431 ms    R Axis 47 degrees    T Axis 178 degrees    Diagnosis       Atrial fibrillation with rapid ventricular responseNonspecific ST abnormalityConfirmed by TIRSO SARMIENTO, Stacy James ([id number]) on 4/16/2019 9:42:49 AM      Radiographs (if obtained):  [] The following radiograph was interpreted by myself in the absence of a radiologist:   [] Radiologist's Report Reviewed:  XR CHEST PORTABLE   Final Result   Minimal lingular atelectasis or pneumonitis. Follow-up to resolution   suggested. NM Cardiac Stress Test Nuclear Imaging    (Results Pending)         EKG (if obtained): (All EKG's are interpreted by myself in the absence of a cardiologist) EKG with no comparison. A. fib with RVR. Rate 163. Normal axis. QRS 80. . Nonspecific ST changes laterally. Normal R-wave progression. No acute ST elevation. Abnormal EKG.     Chart review shows recent radiographs:  Xr Chest Portable    Result Date: 4/16/2019  EXAMINATION: SINGLE XRAY VIEW OF THE CHEST 4/16/2019 6:24 am COMPARISON: 05/21/2018 HISTORY: ORDERING SYSTEM PROVIDED HISTORY: afib TECHNOLOGIST PROVIDED HISTORY: Reason for exam:->afib Ordering Physician Provided Reason for Exam: afib FINDINGS: Cardiac leads project over the chest.  Minimal opacity is seen adjacent to the left heart border. No pleural effusion. No pneumothorax. Cardiac and mediastinal silhouettes are similar to prior. Remote fractures of posterior left 5th, 6th, 7th ribs. Minimal lingular atelectasis or pneumonitis. Follow-up to resolution suggested. MDM:  59-year-old male presents with new onset A. fib with RVR. He was seen immediately upon arrival and diltiazem drip started. He was mentating normally with a normal blood pressure and although electrical cardioversion was considered he did not require this. Patient given initially 20 mg of IV diltiazem, 2 g IV piggyback magnesium and then required an additional 10 mg IV diltiazem with drip.  4 baby aspirin secondary to chest pain initially. Troponin negative. D-dimer negative. Left lites normal.  Alcohol 40. Suspect some degree of holiday heart. With increased rate control patient's symptoms resolved. On multiple repeat evaluations and prior to going up to the floor he is chest pain-free with no shortness of breath. Afebrile. Critical care time of 35 minutes with cardiac pulmonary systems at acute risk of decompensation and collapse requiring titration of diltiazem drips and frequent bedside repeat evaluation. This is exclusionary of any billable procedures. Clinical Impression:  1. New onset a-fib ([address]    2. Atrial fibrillation with RVR ([address]    3. Alcohol use    4.  Acute chest pain      Disposition referral (if applicable):  Bashir Gardiner MD  [address] [facility] [address]  [phone]          Disposition medications (if applicable):  Current

## 2019-04-17 NOTE — PROGRESS NOTES
Clinical Pharmacy Note  Medication Counseling    Reviewed new medications started during hospital admission: lovenox, diltiazem, . Indications and side effects were emphasized during counseling. All medication-related questions addressed. Patient verbalized understanding of education. Should the patient express any additional questions or concerns regarding their medications, please do not hesitate to contact the pharmacy department. Patient/caregiver aware they may refuse medications during hospital stay.

## 2019-04-17 NOTE — PROGRESS NOTES
Patient converted to sinus rhythm/miguel around 2215. Cardizem gtt stopped. Will continue to monitor.

## 2019-04-18 ENCOUNTER — TELEPHONE (OUTPATIENT)
Dept: FAMILY MEDICINE CLINIC | Age: 64
End: 2019-04-18

## 2019-04-18 ENCOUNTER — TELEPHONE (OUTPATIENT)
Dept: CARDIOLOGY CLINIC | Age: 64
End: 2019-04-18

## 2019-04-18 LAB
EKG ATRIAL RATE: 131 BPM
EKG DIAGNOSIS: NORMAL
EKG Q-T INTERVAL: 262 MS
EKG QRS DURATION: 80 MS
EKG QTC CALCULATION (BAZETT): 431 MS
EKG R AXIS: 47 DEGREES
EKG T AXIS: 178 DEGREES
EKG VENTRICULAR RATE: 163 BPM

## 2019-04-18 NOTE — TELEPHONE ENCOUNTER
[facility] [facility] Transitions Initial Follow Up Call    Outreach made within 2 business days of discharge: Yes    Patient: Mamadou Villanueva Patient : 1955   MRN: [id number]  Reason for Admission: There are no discharge diagnoses documented for the most recent discharge. Discharge Date: 19       Spoke with: pt    Discharge department/facility: [facility] Interactive Patient Contact:  Was patient able to fill all prescriptions: Yes  Was patient instructed to bring all medications to the follow-up visit: Yes  Is patient taking all medications as directed in the discharge summary?  Yes  Does patient understand their discharge instructions: Yes  Does patient have questions or concerns that need addressed prior to 7-14 day follow up office visit: no    Scheduled appointment with PCP within 7-14 days  Pt is following up with cardiology  Declined appt with pcp at this time  Follow Up  Future Appointments   Date Time Provider Chioma Barajas   2019  9:00 AM Theoplis Cheadle, APRN - CNP [facility] Cardio MED   2019  8:00 AM Colette Cheadle, MD [facility]       Myesha Bosch

## 2019-04-18 NOTE — TELEPHONE ENCOUNTER
Please advise     Called patient lmom that Nikki Zamora is OOT and will have office on Tuesday the 23rd

## 2019-04-18 NOTE — TELEPHONE ENCOUNTER
Pt was at [facility] 4-16 to 4-17 for Afib. He saw a Cardiologist (cant remember name) there who stated he didn't need to do the lab work for upcoming [facility] ov for 5-14 since they did blood work there. Pt is having conflicting information. He would like to speak to [facility] directly about staying with her or starting to see a DR at [facility].  He stated that if he ever had to be taken by EMT to hospital it would be at [facility].  He wants to stay with [facility] but not sure if it's for the best and he wants to discuss the whole situation with her. Pls call to advise. Thank you.

## 2019-04-23 ENCOUNTER — TELEPHONE (OUTPATIENT)
Dept: CARDIOLOGY CLINIC | Age: 64
End: 2019-04-23

## 2019-04-24 ENCOUNTER — TELEPHONE (OUTPATIENT)
Dept: CARDIOLOGY CLINIC | Age: 64
End: 2019-04-24

## 2019-04-24 NOTE — TELEPHONE ENCOUNTER
Pt called with symptoms of headache that he had over weekend, he was reading SE of elliquis and thought it might be from this, it is NOT the worse headache he has had and is beter when I  talked to him, will stop eliquis and change to xarelto 20 mg once a day  D/w him going to ER for headache but he refused, it is better he has OV 5/14

## 2019-05-14 ENCOUNTER — OFFICE VISIT (OUTPATIENT)
Dept: CARDIOLOGY CLINIC | Age: 64
End: 2019-05-14
Payer: COMMERCIAL

## 2019-05-14 VITALS
SYSTOLIC BLOOD PRESSURE: 120 MMHG | BODY MASS INDEX: 31.21 KG/M2 | DIASTOLIC BLOOD PRESSURE: 70 MMHG | HEART RATE: 62 BPM | WEIGHT: 218 LBS | HEIGHT: 70 IN

## 2019-05-14 DIAGNOSIS — R73.9 HYPERGLYCEMIA: ICD-10-CM

## 2019-05-14 DIAGNOSIS — I48.0 PAF (PAROXYSMAL ATRIAL FIBRILLATION) (HCC): ICD-10-CM

## 2019-05-14 DIAGNOSIS — I10 ESSENTIAL HYPERTENSION: ICD-10-CM

## 2019-05-14 DIAGNOSIS — I48.91 ATRIAL FIBRILLATION WITH RVR (HCC): ICD-10-CM

## 2019-05-14 PROCEDURE — 99214 OFFICE O/P EST MOD 30 MIN: CPT | Performed by: NURSE PRACTITIONER

## 2019-05-14 NOTE — PROGRESS NOTES
[address]     Outpatient Follow Up Note    CHIEF COMPLAINT / HPI: Hospital Follow Up [facility]) secondary to at [facility] [place] record has been reviewed  Hospital Course progressed as follows per discharge summary: HPI:  Brandon Carter is a 61 y.o. male with history of hypertension, DM II, alcohol abuse, and tobacco dependence who presents with chest pressure and palpitations. Patient woke up in the middle of the night to go the bathroom. When returning to his bed the chest pressure started. It was 9/10 at its peak severity and radiated to his jaw and left arm. He endorses associated racing of his heart and lightheadedness. The chest pressure lasted for about 4 hours and resolved with diltiazem. He denies back pain, nausea, vomiting, diaphoresis. Patient drove himself to the ED where he was found to be in atrial fibrillation with RVR. 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 He drinks about 5 beers (Perry light), 4-5 days per week. He denies history of alcohol withdrawal including tremors, hallucinations, seizures. He did get knee injection 14 hours before went to hospital       He was dx with  Paroxysmal atrial fibrillation. CHADS-VASc = 2 (HTN, DM). New onset atrial fibrillation with RVR. Symptomatic with chest pressure, SOB, palpitations. Cardiology consulted and patient started on diltiazem gtt with successful control of HR, spontaneous conversion to normal sinus rhythm. Patient transitioned to oral diltiazem. Patient given therapeutic lovenox which was switched to eliquis on discharge. Patient counseled on importance of reducing alcohol consumption. He was started on  Add PO diltiazem  mg daily. Add PO eliquis 5 mg BID. He sent an email few days after DC, he thought the eliquis was causing him to have a headache. He also had some sinus issues.   He did change to xarelto and the sinus issues and headache went away. He does not think he has had any at fib since DC,  His daughters are nurse and pharmacist, and have been checking pulse      Past Medical History:   Diagnosis Date    Arthritis     Dyslipidemia     H/O low back pain     Mild     Hypertension     Kidney stone      Social History:    Social History     Tobacco Use   Smoking Status Former Smoker    Packs/day: 0.50    Years: 40.00    Pack years: 20.00    Start date: 1/17/1976    Last attempt to quit: 1/17/2016    Years since quitting: 3.3   Smokeless Tobacco Never Used   Tobacco Comment    smokes cigars 1-2 a week     Current Medications:  Current Outpatient Medications   Medication Sig Dispense Refill    metFORMIN (GLUCOPHAGE-XR) 500 MG extended release tablet TAKE 1 TABLET EVERY MORNINGAND 2 TABLETS EVERY EVENING 90 tablet 1    rivaroxaban (XARELTO) 20 MG TABS tablet Take 1 tablet by mouth daily (with breakfast) 90 tablet 1    diltiazem (CARDIZEM CD) 240 MG extended release capsule Take 1 capsule by mouth daily 60 capsule 1    albuterol sulfate  (90 Base) MCG/ACT inhaler Inhale 2 puffs into the lungs every 6 hours as needed for Wheezing      blood glucose test strips (ONE TOUCH ULTRA TEST) strip TEST BLOOD SUGAR  Up to  Twice a day as needed. 100 strip 3    benazepril (LOTENSIN) 40 MG tablet Take 1 tablet by mouth daily 90 tablet 2    ONETOUCH DELICA LANCETS FINE MISC 3 each by Does not apply route 3 times daily 300 each 3    Blood Glucose Monitoring Suppl (ONE TOUCH ULTRA 2) w/Device KIT 1 kit by Other route 3 times daily 1 kit 3    Esomeprazole Magnesium (NEXIUM PO) Take by mouth       No current facility-administered medications for this visit. REVIEW OF SYSTEMS:   CONSTITUTIONAL: No major weight gain or loss, fatigue, weakness, night sweats or fever. There's been no change in energy level, sleep pattern, or activity level. HEENT: No new vision difficulties or ringing in the ears.   RESPIRATORY: No new SOB, PND, orthopnea or cough. CARDIOVASCULAR: See HPI  GI: No nausea, vomiting, diarrhea, constipation, abdominal pain or changes in bowel habits. : No urinary frequency, urgency, incontinence hematuria or dysuria. SKIN: No cyanosis or skin lesions. MUSCULOSKELETAL: No new muscle or joint pain. NEUROLOGICAL: No syncope or TIA-like symptoms. PSYCHIATRIC: No anxiety, pain, insomnia or depression    Objective:   PHYSICAL EXAM:        VITALS:  /70   Pulse 62   Ht 5' 10\" (1.778 m)   Wt 218 lb (98.9 kg)   BMI 31.28 kg/m²     CONSTITUTIONAL: Cooperative, no apparent distress, and appears well nourished / developed  NEUROLOGIC:  Awake and orientated to person, place and time. PSYCH: Calm affect. SKIN: Warm and dry. HEENT: Sclera non-icteric, normocephalic, neck supple, no elevation of JVP, normal carotid pulses with no bruits and thyroid normal size. LUNGS:  No increased work of breathing and clear to auscultation, no crackles or wheezing. CARDIOVASCULAR:  Regular rate and rhythm with no murmurs, gallops, rubs, or abnormal heart sounds, normal PMI. The apical impulses not displaced. Heart tones are crisp and normal                                           Cervical veins are not engorged                 JVP less than 8 cm H2O                                                         The carotid upstroke is normal in amplitude and contour without delay or bruit    ABDOMEN:  Normal bowel sounds, non-distended and non-tender to palpation, [place]   EXT: No edema, no calf tenderness. Pulses are present bilaterally.     DATA:    Lab Results   Component Value Date    ALT 19 04/17/2019    AST 16 04/17/2019    ALKPHOS 70 04/17/2019    BILITOT 0.4 04/17/2019     Lab Results   Component Value Date    CREATININE 0.8 04/17/2019    BUN 23 (H) 04/17/2019     04/17/2019    K 4.2 04/17/2019     04/17/2019    CO2 24 04/17/2019     Lab Results   Component Value Date    TSH 0.71 2019     Lab Results   Component Value Date    WBC 8.2 2019    HGB 14.3 2019    HCT 42.1 2019    MCV 98.3 2019     2019     No components found for: CHLPL  Lab Results   Component Value Date    TRIG 140 2018    TRIG 140 2018    TRIG 232 (H) 05/15/2017     Lab Results   Component Value Date    HDL 43 2019    HDL 46 2018    HDL 46 2018     Lab Results   Component Value Date    LDLCALC 67 2019    LDLCALC 76 2018    LDLCALC 76 2018     Lab Results   Component Value Date    LABVLDL 31 2019    LABVLDL 27 2010     Radiology Review:  Pertinent images / reports were reviewed as a part of this visit and reveals the followin/19 Echo:   Mod. Conc. LVH; Estimated ejection fraction is 60%. The left atrium is moderately dilated. Mild aortic regurgitation is present. The ascending aorta is mildly dilated at    4.2 cm. Trivial Pulmonic and Tricuspid Regurgitation. GXT-   Summary    Normal myocardial perfusion study.    Normal LV size and systolic function.        No ischemic ECG changes with level of exertion achieved (83%).    Good exercise capacity             Assessment:      Diagnosis Orders   1. PAF (paroxysmal atrial fibrillation) ([facility])   NSR converted while in hospital with dilt now on PO dilt Cardiac event monitor   2. Hyperglycemia     3. Essential hypertension stable    4. Atrial fibrillation with RVR (HCC) NSR be exam and EKG yesterday NSR        Patient  is stable since hospital discharge. Plan:   Event recorder to see at fib burden  Will call after results of monitor  NO med changes      The patient  currently  is  smoking. The risks related to smoking were reviewed with the patient. Recommend maintaining a smoke-free lifestyle. Products available for smoking cessation were discussed.     Angiotension inhibitor/angiotension receptor blocker has __ been prescribed / recommended for congestive

## 2019-06-24 RX ORDER — BENAZEPRIL HYDROCHLORIDE 40 MG/1
40 TABLET, FILM COATED ORAL DAILY
Qty: 90 TABLET | Refills: 2 | Status: SHIPPED | OUTPATIENT
Start: 2019-06-24 | End: 2019-06-25 | Stop reason: DRUGHIGH

## 2019-06-25 RX ORDER — BENAZEPRIL HYDROCHLORIDE 40 MG/1
40 TABLET, FILM COATED ORAL DAILY
Qty: 90 TABLET | Refills: 2 | Status: SHIPPED | OUTPATIENT
Start: 2019-06-25 | End: 2019-07-19 | Stop reason: SDUPTHER

## 2019-06-28 ENCOUNTER — TELEPHONE (OUTPATIENT)
Dept: CARDIOLOGY CLINIC | Age: 64
End: 2019-06-28

## 2019-07-02 PROCEDURE — 93268 ECG RECORD/REVIEW: CPT | Performed by: INTERNAL MEDICINE

## 2019-07-19 RX ORDER — METFORMIN HYDROCHLORIDE 500 MG/1
TABLET, EXTENDED RELEASE ORAL
Qty: 90 TABLET | Refills: 1 | Status: SHIPPED | OUTPATIENT
Start: 2019-07-19 | End: 2019-08-26 | Stop reason: SDUPTHER

## 2019-07-19 RX ORDER — BENAZEPRIL HYDROCHLORIDE 40 MG/1
40 TABLET, FILM COATED ORAL DAILY
Qty: 90 TABLET | Refills: 2 | Status: SHIPPED | OUTPATIENT
Start: 2019-07-19 | End: 2020-04-06 | Stop reason: SDUPTHER

## 2019-08-02 ENCOUNTER — TELEPHONE (OUTPATIENT)
Dept: FAMILY MEDICINE CLINIC | Age: 64
End: 2019-08-02

## 2019-08-02 DIAGNOSIS — E11.9 CONTROLLED TYPE 2 DIABETES MELLITUS WITHOUT COMPLICATION, WITHOUT LONG-TERM CURRENT USE OF INSULIN (HCC): ICD-10-CM

## 2019-08-02 DIAGNOSIS — I10 ESSENTIAL HYPERTENSION: ICD-10-CM

## 2019-08-02 DIAGNOSIS — E78.5 DYSLIPIDEMIA: ICD-10-CM

## 2019-08-02 DIAGNOSIS — Z12.5 SCREENING FOR PROSTATE CANCER: Primary | ICD-10-CM

## 2019-08-02 DIAGNOSIS — I48.91 ATRIAL FIBRILLATION WITH RVR (HCC): ICD-10-CM

## 2019-08-02 NOTE — TELEPHONE ENCOUNTER
Pt notified that labs have been ordered  Lab orders placed in envelope and routed to front to be mailed

## 2019-08-06 RX ORDER — DILTIAZEM HYDROCHLORIDE 240 MG/1
240 CAPSULE, COATED, EXTENDED RELEASE ORAL DAILY
Qty: 90 CAPSULE | Refills: 3 | Status: SHIPPED | OUTPATIENT
Start: 2019-08-06 | End: 2020-04-07 | Stop reason: SDUPTHER

## 2019-08-26 ENCOUNTER — OFFICE VISIT (OUTPATIENT)
Dept: FAMILY MEDICINE CLINIC | Age: 64
End: 2019-08-26
Payer: COMMERCIAL

## 2019-08-26 VITALS
HEART RATE: 59 BPM | DIASTOLIC BLOOD PRESSURE: 84 MMHG | BODY MASS INDEX: 31.35 KG/M2 | OXYGEN SATURATION: 96 % | WEIGHT: 219 LBS | SYSTOLIC BLOOD PRESSURE: 126 MMHG | RESPIRATION RATE: 14 BRPM | HEIGHT: 70 IN

## 2019-08-26 DIAGNOSIS — C44.311 BASAL CELL CARCINOMA (BCC) OF SKIN OF NOSE: ICD-10-CM

## 2019-08-26 DIAGNOSIS — E11.9 CONTROLLED TYPE 2 DIABETES MELLITUS WITHOUT COMPLICATION, WITHOUT LONG-TERM CURRENT USE OF INSULIN (HCC): Primary | ICD-10-CM

## 2019-08-26 DIAGNOSIS — I10 ESSENTIAL HYPERTENSION: ICD-10-CM

## 2019-08-26 DIAGNOSIS — I48.0 PAF (PAROXYSMAL ATRIAL FIBRILLATION) (HCC): ICD-10-CM

## 2019-08-26 DIAGNOSIS — E78.5 DYSLIPIDEMIA: ICD-10-CM

## 2019-08-26 PROCEDURE — 99214 OFFICE O/P EST MOD 30 MIN: CPT | Performed by: INTERNAL MEDICINE

## 2019-08-26 RX ORDER — APIXABAN 5 MG/1
5 TABLET, FILM COATED ORAL 2 TIMES DAILY
COMMUNITY
Start: 2019-08-15 | End: 2020-04-06 | Stop reason: SDUPTHER

## 2019-08-26 RX ORDER — METFORMIN HYDROCHLORIDE 500 MG/1
TABLET, EXTENDED RELEASE ORAL
Qty: 270 TABLET | Refills: 1 | Status: SHIPPED | OUTPATIENT
Start: 2019-08-26 | End: 2020-03-03

## 2019-08-26 ASSESSMENT — ENCOUNTER SYMPTOMS
COUGH: 0
NAUSEA: 0
SHORTNESS OF BREATH: 0
VOMITING: 0

## 2019-08-26 NOTE — PROGRESS NOTES
Tobacco Use    Smoking status: Former Smoker     Packs/day: 0.50     Years: 40.00     Pack years: 20.00     Start date: 1/17/1976     Last attempt to quit: 1/17/2016     Years since quitting: 3.6    Smokeless tobacco: Never Used    Tobacco comment: smokes cigars 1-2 a week   Substance and Sexual Activity    Alcohol use: Yes     Alcohol/week: 3.0 standard drinks     Types: 3 Cans of beer per week     Comment: much less since dx of diabetes    Drug use: No    Sexual activity: None   Lifestyle    Physical activity:     Days per week: None     Minutes per session: None    Stress: None   Relationships    Social connections:     Talks on phone: None     Gets together: None     Attends Buddhism service: None     Active member of club or organization: None     Attends meetings of clubs or organizations: None     Relationship status: None    Intimate partner violence:     Fear of current or ex partner: None     Emotionally abused: None     Physically abused: None     Forced sexual activity: None   Other Topics Concern    None   Social History Narrative    None      No family history on file. Prior to Visit Medications    Medication Sig Taking? Authorizing Provider   diltiazem (CARDIZEM CD) 240 MG extended release capsule Take 1 capsule by mouth daily Yes GAL Garland CNP   benazepril (LOTENSIN) 40 MG tablet Take 1 tablet by mouth daily Yes GAL Garland CNP   metFORMIN (GLUCOPHAGE-XR) 500 MG extended release tablet TAKE 1 TABLET IN THE       MORNING AND 2 TABLETS  IN  THE EVENING Yes Juan Manuel Church MD   albuterol sulfate  (90 Base) MCG/ACT inhaler Inhale 2 puffs into the lungs every 6 hours as needed for Wheezing Yes Historical Provider, MD   blood glucose test strips (ONE TOUCH ULTRA TEST) strip TEST BLOOD SUGAR  Up to  Twice a day as needed.  Yes Ramos Hoang MD   [facility] LANCETS FINE MISC 3 each by Does not apply route 3 times daily Yes Blade March MD Camacho   Blood Glucose Monitoring Suppl (ONE TOUCH ULTRA 2) w/Device KIT 1 kit by Other route 3 times daily Yes Nghia Greenberg MD   Esomeprazole Magnesium (NEXIUM PO) Take by mouth Yes Historical Provider, MD   ELIQUIS 5 MG TABS tablet Take 5 mg by mouth 2 times daily  Historical Provider, MD   rivaroxaban (XARELTO) 20 MG TABS tablet Take 1 tablet by mouth daily (with breakfast)  Patient not taking: Reported on 8/26/2019  GAL Orellana - CNP     Patient Active Problem List   Diagnosis    Kidney stone on right side-(7/11)    ED (erectile dysfunction) of organic origin    Colonic polyps(COLO 2009--REPEAT 1/15-polyp x 1-ghastine    Hyperlipemia    Hyperglycemia    Obesity-advised diet/exercise    Anisocoria-right eye--chronic     H/O low back pain,Mild     Medial meniscus tear,Right    GERD (gastroesophageal reflux disease)--on daily ppi    Essential hypertension    Closed fracture of radius    Carpal tunnel syndrome of left wrist    Controlled type 2 diabetes mellitus without complication, without long-term current use of insulin ([facility] [address]    Dyslipidemia declines medication  8/18    Basal cell carcinoma (BCC) of skin of nose dr Judge Michael Atrial fibrillation with RVR ([facility] [address]    [facility] at rest [facility])    Alcohol use    PAF (paroxysmal atrial fibrillation) ([facility] [address]        LABS:   Lab Results   Component Value Date    GLUCOSE 150 (H) 04/17/2019     Lab Results   Component Value Date     04/17/2019    K 4.2 04/17/2019    CREATININE 0.8 04/17/2019     Cholesterol, Total   Date Value Ref Range Status   05/03/2018 144 <200 mg/dL Final   05/03/2018 144 <200 mg/dL Final     Cholesterol   Date Value Ref Range Status   05/03/2018 98 <130 mg/dL (calc) Final     Comment:     For patients with diabetes plus 1 major ASCVD risk  factor, treating to a non-HDL-C goal of <100 mg/dL  (LDL-C of <70 mg/dL) is considered a therapeutic  option.      05/03/2018 98 <130 mg/dL (calc) Final     Comment:

## 2019-08-27 ENCOUNTER — TELEPHONE (OUTPATIENT)
Dept: FAMILY MEDICINE CLINIC | Age: 64
End: 2019-08-27

## 2019-08-27 NOTE — PROGRESS NOTES
[address]   Electrophysiology Consultation   Date: 8/28/2019  Atrial fibReason for Consultation: atrial fib  Consult Requesting Physician: Paul Smyth MD      Chief Complaint   Patient presents with    Atrial Fibrillation        HPI: Rohan Chawla is a 61 y.o. who presented to the ED on 4/16/19 with chest pain, palpitations and SOB, he was found to be in atrial fib with RVR rate 162. He was bolused and started on a Cardizem drip. Troponin's were negative. He remained in atrial fib with controlled rate. He was discharged on Cardizem and elequis. He has a history of HTN and DM. He admits to heavy drinking and smoking since he retired. Interval history:  He states he only has a couple of drinks a week(likely has not changed habit). He has noticed these short episodes of palpitations for several years. He was symptomatic with Atrial fibrillation but has had no episodes of significance since admission    Past Medical History:   Diagnosis Date    Arthritis     Dyslipidemia     H/O low back pain     Mild     Hypertension     Kidney stone         Past Surgical History:   Procedure Laterality Date    BLEPHAROPLASTY      Bilateral     JOINT REPLACEMENT      surgery no replacement    KNEE ARTHROSCOPY Left 2/5/2014    LEG SURGERY      x 2-Right     NECK SURGERY      herniated disc march 5, 2013    OTHER SURGICAL HISTORY      Ruptured Disc     OTHER SURGICAL HISTORY Left 07/2016    orif--left radial fx    ROTATOR CUFF REPAIR      Left x 2        Allergies: Allergies   Allergen Reactions    Sulfa Antibiotics        Social History:   reports that he quit smoking about 3 years ago. He started smoking about 43 years ago. He has a 20.00 pack-year smoking history. He has never used smokeless tobacco. He reports that he drinks about 3.0 standard drinks of alcohol per week. He reports that he does not use drugs. Family History:     Reviewed.  Denies family history of sudden cardiac death, Brad personally performed the services described in this documentation as scribed by RN in my presence, and it is both accurate and complete.

## 2019-08-28 ENCOUNTER — OFFICE VISIT (OUTPATIENT)
Dept: CARDIOLOGY CLINIC | Age: 64
End: 2019-08-28
Payer: COMMERCIAL

## 2019-08-28 ENCOUNTER — TELEPHONE (OUTPATIENT)
Dept: CARDIOLOGY CLINIC | Age: 64
End: 2019-08-28

## 2019-08-28 VITALS
SYSTOLIC BLOOD PRESSURE: 124 MMHG | BODY MASS INDEX: 31.04 KG/M2 | HEART RATE: 59 BPM | WEIGHT: 216.8 LBS | HEIGHT: 70 IN | DIASTOLIC BLOOD PRESSURE: 79 MMHG

## 2019-08-28 DIAGNOSIS — E66.9 OBESITY (BMI 30-39.9): ICD-10-CM

## 2019-08-28 DIAGNOSIS — I10 BENIGN ESSENTIAL HTN: ICD-10-CM

## 2019-08-28 DIAGNOSIS — I10 ESSENTIAL HYPERTENSION: ICD-10-CM

## 2019-08-28 DIAGNOSIS — I48.0 PAF (PAROXYSMAL ATRIAL FIBRILLATION) (HCC): Primary | ICD-10-CM

## 2019-08-28 DIAGNOSIS — I48.91 ATRIAL FIBRILLATION WITH RVR (HCC): ICD-10-CM

## 2019-08-28 DIAGNOSIS — F10.10 ALCOHOL ABUSE: ICD-10-CM

## 2019-08-28 DIAGNOSIS — E78.5 HYPERLIPIDEMIA, UNSPECIFIED HYPERLIPIDEMIA TYPE: Primary | ICD-10-CM

## 2019-08-28 PROCEDURE — 93000 ELECTROCARDIOGRAM COMPLETE: CPT | Performed by: INTERNAL MEDICINE

## 2019-08-28 PROCEDURE — 99214 OFFICE O/P EST MOD 30 MIN: CPT | Performed by: INTERNAL MEDICINE

## 2020-02-24 ENCOUNTER — OFFICE VISIT (OUTPATIENT)
Dept: FAMILY MEDICINE CLINIC | Age: 65
End: 2020-02-24
Payer: COMMERCIAL

## 2020-02-24 VITALS
OXYGEN SATURATION: 94 % | RESPIRATION RATE: 14 BRPM | BODY MASS INDEX: 33.27 KG/M2 | SYSTOLIC BLOOD PRESSURE: 122 MMHG | WEIGHT: 232.4 LBS | HEIGHT: 70 IN | DIASTOLIC BLOOD PRESSURE: 84 MMHG | HEART RATE: 67 BPM

## 2020-02-24 LAB — HBA1C MFR BLD: 6.7 %

## 2020-02-24 PROCEDURE — 99214 OFFICE O/P EST MOD 30 MIN: CPT | Performed by: INTERNAL MEDICINE

## 2020-02-24 PROCEDURE — 90472 IMMUNIZATION ADMIN EACH ADD: CPT | Performed by: INTERNAL MEDICINE

## 2020-02-24 PROCEDURE — 90471 IMMUNIZATION ADMIN: CPT | Performed by: INTERNAL MEDICINE

## 2020-02-24 PROCEDURE — 90715 TDAP VACCINE 7 YRS/> IM: CPT | Performed by: INTERNAL MEDICINE

## 2020-02-24 PROCEDURE — 83036 HEMOGLOBIN GLYCOSYLATED A1C: CPT | Performed by: INTERNAL MEDICINE

## 2020-02-24 PROCEDURE — 90750 HZV VACC RECOMBINANT IM: CPT | Performed by: INTERNAL MEDICINE

## 2020-02-24 ASSESSMENT — PATIENT HEALTH QUESTIONNAIRE - PHQ9
SUM OF ALL RESPONSES TO PHQ QUESTIONS 1-9: 0
2. FEELING DOWN, DEPRESSED OR HOPELESS: 0
1. LITTLE INTEREST OR PLEASURE IN DOING THINGS: 0
SUM OF ALL RESPONSES TO PHQ9 QUESTIONS 1 & 2: 0
SUM OF ALL RESPONSES TO PHQ QUESTIONS 1-9: 0

## 2020-02-24 ASSESSMENT — ENCOUNTER SYMPTOMS
CONSTIPATION: 0
DIARRHEA: 0
SHORTNESS OF BREATH: 0
COUGH: 0

## 2020-02-24 NOTE — PROGRESS NOTES
2/24/2020    Chief Complaint   Patient presents with    Diabetes       HPI  DM   probably not eating as well. Says he is not eating right   Working a lot remodeling   Lab Results   Component Value Date    LABA1C 6.7 02/24/2020    LABA1C 6.2 02/14/2019    LABA1C 5.7 08/13/2018       Discussed psa it was 2 , a prior one was one  No prostate  Cancer in the family    htn  Not cking bp at home  No ha or dizziness      Review of Systems   Constitutional: Negative for chills and fever. Respiratory: Negative for cough and shortness of breath. Gastrointestinal: Negative for constipation and diarrhea. Neurological: Negative for dizziness and light-headedness.        Health Maintenance   Topic Date Due    Hepatitis C screen  1955    HIV screen  11/28/1970    Hepatitis B vaccine (1 of 3 - Risk 3-dose series) 11/28/1974    Shingles Vaccine (1 of 2) 11/28/2005    DTaP/Tdap/Td vaccine (2 - Td) 02/24/2019    Diabetic microalbuminuria test  05/03/2019    A1C test (Diabetic or Prediabetic)  02/14/2020    Diabetic retinal exam  03/20/2020    Lipid screen  04/17/2020    Potassium monitoring  04/17/2020    Creatinine monitoring  04/17/2020    Diabetic foot exam  08/26/2020    Colon cancer screen colonoscopy  01/01/2025    Flu vaccine  Completed    Pneumococcal 0-64 years Vaccine  Completed    Hepatitis A vaccine  Aged Out    Hib vaccine  Aged Out    Meningococcal (ACWY) vaccine  Aged Out      Social History     Socioeconomic History    Marital status:      Spouse name: None    Number of children: 3    Years of education: None    Highest education level: None   Occupational History    Occupation: Tech @ [employer]      Employer: [address] [place] [address] Occupation: Retired October 2014   Social Needs    Financial resource strain: None    Food insecurity:     Worry: None     Inability: None    Transportation needs:     Medical: None     Non-medical: None   Tobacco Use    Smoking status: Former Smoker     Packs/day: 0.50     Years: 40.00     Pack years: 20.00     Start date: 1976     Last attempt to quit: 2016     Years since quittin.1    Smokeless tobacco: Never Used    Tobacco comment: smokes cigars 1-2 a week   Substance and Sexual Activity    Alcohol use: Yes     Alcohol/week: 3.0 standard drinks     Types: 3 Cans of beer per week     Comment: much less since dx of diabetes    Drug use: No    Sexual activity: None   Lifestyle    Physical activity:     Days per week: None     Minutes per session: None    Stress: None   Relationships    Social connections:     Talks on phone: None     Gets together: None     Attends Moravian service: None     Active member of club or organization: None     Attends meetings of clubs or organizations: None     Relationship status: None    Intimate partner violence:     Fear of current or ex partner: None     Emotionally abused: None     Physically abused: None     Forced sexual activity: None   Other Topics Concern    None   Social History Narrative    None      No family history on file. Prior to Visit Medications    Medication Sig Taking? Authorizing Provider   ELIQUIS 5 MG TABS tablet Take 5 mg by mouth 2 times daily Yes Historical Provider, MD   metFORMIN (GLUCOPHAGE-XR) 500 MG extended release tablet TAKE 1 TABLET IN THE       MORNING AND 2 TABLETS  IN  THE EVENING Yes Altagracia Briseno MD   diltiazem (CARDIZEM CD) 240 MG extended release capsule Take 1 capsule by mouth daily Yes GAL Mariano CNP   benazepril (LOTENSIN) 40 MG tablet Take 1 tablet by mouth daily Yes GAL Mariano CNP   albuterol sulfate  (90 Base) MCG/ACT inhaler Inhale 2 puffs into the lungs every 6 hours as needed for Wheezing Yes Historical Provider, MD   blood glucose test strips (ONE TOUCH ULTRA TEST) strip TEST BLOOD SUGAR  Up to  Twice a day as needed.  Yes Altagracia Briseno MD   [facility] LANCETS FINE MISC 3 each by Does not Status   04/17/2019 67 <100 mg/dL Final     HDL   Date Value Ref Range Status   04/17/2019 43 40 - 60 mg/dL Final   08/19/2010 40 40 - 60 mg/dl Final     Triglycerides   Date Value Ref Range Status   05/03/2018 140 <150 mg/dL Final   05/03/2018 140 <150 mg/dL Final     Lab Results   Component Value Date    ALT 19 04/17/2019    AST 16 04/17/2019    ALKPHOS 70 04/17/2019    BILITOT 0.4 04/17/2019      Lab Results   Component Value Date    WBC 8.2 04/17/2019    HGB 14.3 04/17/2019    HCT 42.1 04/17/2019    MCV 98.3 04/17/2019     04/17/2019     TSH (uIU/mL)   Date Value   04/16/2019 0.71     Lab Results   Component Value Date    LABA1C 6.7 02/24/2020     Lab Results   Component Value Date    PSA 1.0 08/13/2018    PSA 0.77 10/28/2013        PHYSICAL EXAM:  /84   Pulse 67   Resp 14   Ht 5' 10\" (1.778 m)   Wt 232 lb 6.4 oz (105.4 kg)   SpO2 94%   BMI 33.35 kg/m²    Physical Exam  Constitutional:       Appearance: Normal appearance. He is well-developed. He is obese. HENT:      Head: Normocephalic and atraumatic. Cardiovascular:      Rate and Rhythm: Normal rate and regular rhythm. Heart sounds: No murmur. Pulmonary:      Effort: Pulmonary effort is normal.      Breath sounds: Normal breath sounds. No wheezing. Abdominal:      Palpations: Abdomen is soft. Tenderness: There is no abdominal tenderness. Skin:     General: Skin is warm and dry. Neurological:      Mental Status: He is alert. Psychiatric:         Mood and Affect: Mood normal.         Behavior: Behavior normal.         Thought Content:  Thought content normal.         Judgment: Judgment normal.       BP Readings from Last 5 Encounters:   02/24/20 122/84   08/28/19 124/79   08/26/19 126/84   05/14/19 120/70   04/17/19 109/76       Wt Readings from Last 5 Encounters:   02/24/20 232 lb 6.4 oz (105.4 kg)   08/28/19 216 lb 12.8 oz (98.3 kg)   08/26/19 219 lb (99.3 kg)   05/14/19 218 lb (98.9 kg)   04/17/19 216 lb 7.9 oz (98.2

## 2020-03-03 RX ORDER — METFORMIN HYDROCHLORIDE 500 MG/1
TABLET, EXTENDED RELEASE ORAL
Qty: 270 TABLET | Refills: 1 | Status: SHIPPED | OUTPATIENT
Start: 2020-03-03 | End: 2020-07-16 | Stop reason: SDUPTHER

## 2020-04-06 RX ORDER — APIXABAN 5 MG/1
5 TABLET, FILM COATED ORAL 2 TIMES DAILY
Qty: 180 TABLET | Refills: 1 | Status: SHIPPED | OUTPATIENT
Start: 2020-04-06 | End: 2020-10-22

## 2020-04-06 RX ORDER — BENAZEPRIL HYDROCHLORIDE 40 MG/1
40 TABLET, FILM COATED ORAL DAILY
Qty: 90 TABLET | Refills: 0 | Status: SHIPPED | OUTPATIENT
Start: 2020-04-06 | End: 2020-05-21 | Stop reason: SDUPTHER

## 2020-04-07 ENCOUNTER — TELEPHONE (OUTPATIENT)
Dept: CARDIOLOGY CLINIC | Age: 65
End: 2020-04-07

## 2020-04-07 RX ORDER — DILTIAZEM HYDROCHLORIDE 240 MG/1
240 CAPSULE, COATED, EXTENDED RELEASE ORAL DAILY
Qty: 90 CAPSULE | Refills: 3 | Status: SHIPPED | OUTPATIENT
Start: 2020-04-07 | End: 2020-10-07

## 2020-04-30 ENCOUNTER — TELEPHONE (OUTPATIENT)
Dept: CARDIOLOGY CLINIC | Age: 65
End: 2020-04-30

## 2020-04-30 NOTE — TELEPHONE ENCOUNTER
Please mail order for CMP to patients home . He gets labs done at [facility] .  He has appt with npts in 3 weeks

## 2020-05-15 ENCOUNTER — TELEPHONE (OUTPATIENT)
Dept: CARDIOLOGY CLINIC | Age: 65
End: 2020-05-15

## 2020-05-21 ENCOUNTER — OFFICE VISIT (OUTPATIENT)
Dept: CARDIOLOGY CLINIC | Age: 65
End: 2020-05-21
Payer: COMMERCIAL

## 2020-05-21 VITALS
HEART RATE: 92 BPM | HEIGHT: 70 IN | BODY MASS INDEX: 32.21 KG/M2 | DIASTOLIC BLOOD PRESSURE: 72 MMHG | OXYGEN SATURATION: 93 % | WEIGHT: 225 LBS | SYSTOLIC BLOOD PRESSURE: 142 MMHG

## 2020-05-21 PROCEDURE — 99214 OFFICE O/P EST MOD 30 MIN: CPT | Performed by: NURSE PRACTITIONER

## 2020-05-21 RX ORDER — BENAZEPRIL HYDROCHLORIDE 40 MG/1
40 TABLET, FILM COATED ORAL DAILY
Qty: 90 TABLET | Refills: 2 | Status: SHIPPED | OUTPATIENT
Start: 2020-05-21 | End: 2021-01-11

## 2020-05-21 NOTE — PROGRESS NOTES
is normal in amplitude and contour without delay or bruit  JVP is not elevated  ABDOMEN:  Normal bowel sounds, non-distended and non-tender to palpation  EXT: No edema, no calf tenderness. Pulses are present bilaterally. DATA:    Lab Results   Component Value Date    ALT 36 05/13/2020    AST 24 05/13/2020    ALKPHOS 68 05/13/2020    BILITOT 0.7 05/13/2020     Lab Results   Component Value Date    CREATININE 0.99 05/13/2020    BUN 19 05/13/2020     05/13/2020    K 4.5 05/13/2020     05/13/2020    CO2 28 05/13/2020     Lab Results   Component Value Date    TSH 0.71 04/16/2019     No components found for: CHLPL  Lab Results   Component Value Date    TRIG 140 05/03/2018    TRIG 140 05/03/2018    TRIG 232 (H) 05/15/2017     Lab Results   Component Value Date    HDL 43 04/17/2019    HDL 46 05/03/2018    HDL 46 05/03/2018     Lab Results   Component Value Date    LDLCALC 67 04/17/2019    [address] 05/03/2018    [address] 05/03/2018     Lab Results   Component Value Date    LABVLDL 31 04/17/2019    LABVLDL 27 08/19/2010     Radiology Review:  Pertinent images / reports were reviewed as a part of this visit and reveals the following:    [id number] Score for Atrial Fibrillation Stroke Risk   Risk   Factors  Component Value   C CHF No 0   H HTN Yes 1   A2 Age >= 76 No,  (62 y.o.) 0   D DM Yes 1   S2 Prior Stroke/TIA No 0   V Vascular Disease No 0   A Age 74-69 No,  (62 y.o.) 0   Sc Sex male 0    [id number]  Score  2   Score last updated 5/22/20 1:38 PM EDT       Echo: April '19:  Summary   Mod. Conc. LVH; Estimated ejection fraction is 60%. The left atrium is moderately dilated. Mild aortic regurgitation is present. The ascending aorta is mildly dilated at  4.2 cm. Trivial Pulmonic and Tricuspid Regurgitation. Event Monitor: 5/14 -6/12/19: AF RVR      avg HR 69 min 31 max 146      AF burden < 1%      Assessment:      Diagnosis Orders   1.  PAF (paroxysmal atrial fibrillation) (HCC)   ~stable : AP sent to PCP

## 2020-07-16 RX ORDER — METFORMIN HYDROCHLORIDE 500 MG/1
TABLET, EXTENDED RELEASE ORAL
Qty: 270 TABLET | Refills: 1 | Status: SHIPPED | OUTPATIENT
Start: 2020-07-16 | End: 2020-08-24 | Stop reason: SDUPTHER

## 2020-07-21 ENCOUNTER — HOSPITAL ENCOUNTER (OUTPATIENT)
Dept: NON INVASIVE DIAGNOSTICS | Age: 65
Discharge: HOME | End: 2020-07-21
Payer: COMMERCIAL

## 2020-07-21 LAB
LEFT VENTRICULAR EJECTION FRACTION HIGH VALUE: 60 %
LEFT VENTRICULAR EJECTION FRACTION MODE: NORMAL
LV EF: 55 %
LV EF: 58 %
LVEF MODALITY: NORMAL

## 2020-07-21 PROCEDURE — 93306 TTE W/DOPPLER COMPLETE: CPT

## 2020-08-24 ENCOUNTER — OFFICE VISIT (OUTPATIENT)
Dept: FAMILY MEDICINE CLINIC | Age: 65
End: 2020-08-24
Payer: COMMERCIAL

## 2020-08-24 VITALS
SYSTOLIC BLOOD PRESSURE: 132 MMHG | BODY MASS INDEX: 31.87 KG/M2 | OXYGEN SATURATION: 95 % | RESPIRATION RATE: 14 BRPM | HEART RATE: 76 BPM | TEMPERATURE: 97.5 F | HEIGHT: 70 IN | DIASTOLIC BLOOD PRESSURE: 82 MMHG | WEIGHT: 222.6 LBS

## 2020-08-24 LAB — HBA1C MFR BLD: 6.8 %

## 2020-08-24 PROCEDURE — 90750 HZV VACC RECOMBINANT IM: CPT | Performed by: INTERNAL MEDICINE

## 2020-08-24 PROCEDURE — 99213 OFFICE O/P EST LOW 20 MIN: CPT | Performed by: INTERNAL MEDICINE

## 2020-08-24 PROCEDURE — 90471 IMMUNIZATION ADMIN: CPT | Performed by: INTERNAL MEDICINE

## 2020-08-24 PROCEDURE — 83036 HEMOGLOBIN GLYCOSYLATED A1C: CPT | Performed by: INTERNAL MEDICINE

## 2020-08-24 RX ORDER — METFORMIN HYDROCHLORIDE 500 MG/1
TABLET, EXTENDED RELEASE ORAL
Qty: 270 TABLET | Refills: 1 | Status: SHIPPED | OUTPATIENT
Start: 2020-08-24 | End: 2020-12-08

## 2020-08-24 ASSESSMENT — ENCOUNTER SYMPTOMS
DIARRHEA: 0
SHORTNESS OF BREATH: 0
COUGH: 0
NAUSEA: 0

## 2020-08-24 NOTE — PROGRESS NOTES
2020    Chief Complaint   Patient presents with    Diabetes       HPI    DM   todays hga1c   6.8 . Lab Results   Component Value Date    LABA1C 6.7 2020    LABA1C 6.2 2019    LABA1C 5.7 2018     Had hiv and hep c   hga1c 6.6   Had done  At [facility]  Pt is reporting results. Am glucose -not cking regularly  avg   Mid  130's  Occasionally  170 depending on what he ate the night before. In the afternoon more active. [facility]   Needs to go for exam    Co sagging eyelids     vaccine  Discussed shingles vaccine       htn  Not cking at home   Always fine he says  On diltiazem and lotensin  Review of Systems   Constitutional: Positive for unexpected weight change (weight up). Negative for appetite change. Respiratory: Negative for cough and shortness of breath. Gastrointestinal: Negative for diarrhea and nausea. Neurological: Negative for dizziness and headaches.        Health Maintenance   Topic Date Due    Hepatitis C screen  1955    HIV screen  1970    Diabetic microalbuminuria test  2019    Diabetic retinal exam  2020    Lipid screen  2020    Shingles Vaccine (2 of 2) 2020    Diabetic foot exam  2020    Flu vaccine (1) 2020    A1C test (Diabetic or Prediabetic)  2021    Potassium monitoring  2021    Creatinine monitoring  2021    Colon cancer screen colonoscopy  2025    DTaP/Tdap/Td vaccine (3 - Td) 2030    Pneumococcal 0-64 years Vaccine  Completed    Hepatitis A vaccine  Aged Out    Hib vaccine  Aged Out    Meningococcal (ACWY) vaccine  Aged Out      Social History     Tobacco Use    Smoking status: Former Smoker     Packs/day: 0.50     Years: 40.00     Pack years: 20.00     Start date: 1976     Last attempt to quit: 2016     Years since quittin.6    Smokeless tobacco: Never Used    Tobacco comment: smokes cigars 1-2 a week   Substance Use Topics    Alcohol use: Yes     Alcohol/week: 3.0 standard drinks     Types: 3 Cans of beer per week     Comment: much less since dx of diabetes    Drug use: No      No family history on file. Prior to Visit Medications    Medication Sig Taking? Authorizing Provider   metFORMIN (GLUCOPHAGE-XR) 500 MG extended release tablet TAKE 1 TABLET EVERY MORNINGAND 2 TABLETS EVERY EVENING Yes Lottie Gosselin, MD   benazepril (LOTENSIN) 40 MG tablet Take 1 tablet by mouth daily Yes GAL Do CNP   dilTIAZem (CARDIZEM CD) 240 MG extended release capsule Take 1 capsule by mouth daily Yes GAL Do CNP   ELIQUIS 5 MG TABS tablet Take 1 tablet by mouth 2 times daily Yes GAL Do CNP   blood glucose test strips (ONE TOUCH ULTRA TEST) strip TEST BLOOD SUGAR  Up to  Twice a day as needed.  Yes Lottie Gosselin, MD   [facility] LANCETS FINE MISC 3 each by Does not apply route 3 times daily Yes Lottie Gosselin, MD   Blood Glucose Monitoring Suppl (ONE TOUCH ULTRA 2) w/Device KIT 1 kit by Other route 3 times daily Yes Lottie Gosselin, MD   Esomeprazole Magnesium (NEXIUM PO) Take by mouth Yes Historical Provider, MD     Patient Active Problem List   Diagnosis    Kidney stone on right side-(7/11)    ED (erectile dysfunction) of organic origin    Colonic polyps(COLO 2009--REPEAT 1/15-polyp x 1-ghastine    Hyperglycemia    Obesity-advised diet/exercise    Anisocoria-right eye--chronic     H/O low back pain,Mild     Medial meniscus tear,Right    GERD (gastroesophageal reflux disease)--on daily ppi    Essential hypertension    Closed fracture of radius    Carpal tunnel syndrome of left wrist    Controlled type 2 diabetes mellitus without complication, without long-term current use of insulin (HCC)    Dyslipidemia declines medication  8/18    Basal cell carcinoma (BCC) of skin of nose dr Aixa Poon Atrial fibrillation with RVR ([facility] [address]    Angina at rest [facility])    Alcohol use    PAF (paroxysmal atrial fibrillation) (HCC)        LABS:   Lab Results   Component Value Date    GLUCOSE 150 (H) 05/13/2020     Lab Results   Component Value Date     05/13/2020    K 4.5 05/13/2020    CREATININE 0.99 05/13/2020     Cholesterol, Total   Date Value Ref Range Status   05/03/2018 144 <200 mg/dL Final   05/03/2018 144 <200 mg/dL Final     Cholesterol   Date Value Ref Range Status   05/03/2018 98 <130 mg/dL (calc) Final     Comment:     For patients with diabetes plus 1 major ASCVD risk  factor, treating to a non-HDL-C goal of <100 mg/dL  (LDL-C of <70 mg/dL) is considered a therapeutic  option. 05/03/2018 98 <130 mg/dL (calc) Final     Comment:     For patients with diabetes plus 1 major ASCVD risk  factor, treating to a non-HDL-C goal of <100 mg/dL  (LDL-C of <70 mg/dL) is considered a therapeutic  option. LDL Calculated   Date Value Ref Range Status   04/17/2019 67 <100 mg/dL Final     HDL   Date Value Ref Range Status   04/17/2019 43 40 - 60 mg/dL Final   08/19/2010 40 40 - 60 mg/dl Final     Triglycerides   Date Value Ref Range Status   05/03/2018 140 <150 mg/dL Final   05/03/2018 140 <150 mg/dL Final     Lab Results   Component Value Date    ALT 36 05/13/2020    AST 24 05/13/2020    ALKPHOS 68 05/13/2020    BILITOT 0.7 05/13/2020      Lab Results   Component Value Date    WBC 8.2 04/17/2019    HGB 14.3 04/17/2019    HCT 42.1 04/17/2019    MCV 98.3 04/17/2019     04/17/2019     TSH (uIU/mL)   Date Value   04/16/2019 0.71     Lab Results   Component Value Date    LABA1C 6.7 02/24/2020     Lab Results   Component Value Date    PSA 1.0 08/13/2018    PSA 0.77 10/28/2013        PHYSICAL EXAM:  /82   Pulse 76   Temp 97.5 °F (36.4 °C)   Resp 14   Ht 5' 10\" (1.778 m)   Wt 222 lb 9.6 oz (101 kg)   SpO2 95%   BMI 31.94 kg/m²    Physical Exam  Constitutional:       Appearance: Normal appearance. He is well-developed. HENT:      Head: Normocephalic and atraumatic.    Cardiovascular:      Rate and Rhythm: Normal rate and regular rhythm. Heart sounds: No murmur. Pulmonary:      Breath sounds: Normal breath sounds. No wheezing. Abdominal:      Palpations: Abdomen is soft. Tenderness: There is no abdominal tenderness. Skin:     General: Skin is warm and dry. Neurological:      Mental Status: He is alert. Comments: Foot exam microfilament testing is normal   Psychiatric:         Mood and Affect: Mood normal.         Behavior: Behavior normal.         Thought Content: Thought content normal.         Judgment: Judgment normal.       BP Readings from Last 5 Encounters:   08/24/20 132/82   05/21/20 (!) 142/72   02/24/20 122/84   08/28/19 124/79   08/26/19 126/84       Wt Readings from Last 5 Encounters:   08/24/20 222 lb 9.6 oz (101 kg)   05/21/20 225 lb (102.1 kg)   02/24/20 232 lb 6.4 oz (105.4 kg)   08/28/19 216 lb 12.8 oz (98.3 kg)   08/26/19 219 lb (99.3 kg)      Ching Cobb was seen today for diabetes. Diagnoses and all orders for this visit:    Controlled type 2 diabetes mellitus without complication, without long-term current use of insulin (HCC)  -     POCT glycosylated hemoglobin (Hb A1C)  -     Comprehensive Metabolic Panel; Future  -     Lipid Panel;  Future    Hyperglycemia    Other orders  -     metFORMIN (GLUCOPHAGE-XR) 500 MG extended release tablet; TAKE 1 TABLET EVERY MORNINGAND 2 TABLETS EVERY EVENING  -     Zoster Subunit [facility])      Doing welll  Encouraged eye exam    Fu with me  6 months  Flu /shingles vaccine in oct

## 2020-10-08 RX ORDER — DILTIAZEM HYDROCHLORIDE 240 MG/1
240 CAPSULE, COATED, EXTENDED RELEASE ORAL DAILY
Qty: 90 CAPSULE | Refills: 2 | Status: SHIPPED | OUTPATIENT
Start: 2020-10-08 | End: 2020-10-12

## 2020-10-15 RX ORDER — DILTIAZEM HYDROCHLORIDE 240 MG/1
240 CAPSULE, COATED, EXTENDED RELEASE ORAL DAILY
Qty: 90 CAPSULE | Refills: 2 | Status: SHIPPED | OUTPATIENT
Start: 2020-10-15 | End: 2021-04-15

## 2020-10-22 ENCOUNTER — NURSE ONLY (OUTPATIENT)
Dept: FAMILY MEDICINE CLINIC | Age: 65
End: 2020-10-22
Payer: COMMERCIAL

## 2020-10-22 VITALS — TEMPERATURE: 97.7 F

## 2020-10-22 PROCEDURE — 90686 IIV4 VACC NO PRSV 0.5 ML IM: CPT | Performed by: INTERNAL MEDICINE

## 2020-10-22 PROCEDURE — 90471 IMMUNIZATION ADMIN: CPT | Performed by: INTERNAL MEDICINE

## 2020-10-22 PROCEDURE — 90750 HZV VACC RECOMBINANT IM: CPT | Performed by: INTERNAL MEDICINE

## 2020-10-22 PROCEDURE — 90472 IMMUNIZATION ADMIN EACH ADD: CPT | Performed by: INTERNAL MEDICINE

## 2020-10-22 RX ORDER — APIXABAN 5 MG/1
TABLET, FILM COATED ORAL
Qty: 180 TABLET | Refills: 1 | Status: SHIPPED | OUTPATIENT
Start: 2020-10-22 | End: 2021-04-15

## 2020-10-22 NOTE — PROGRESS NOTES
Vaccine Information Sheet, \"Influenza - Inactivated\"  given to Jordy Crystal, or parent/legal guardian of  Jordy Crystal and verbalized understanding. Patient responses:    Have you ever had a reaction to a flu vaccine? No  Do you have any current illness? No  Have you ever had Guillian [place] Syndrome? No  Do you have a serious allergy to any of the follow: Neomycin, Polymyxin, Thimerosal, eggs or egg products? No    Flu vaccine given per order. Please see immunization tab. Risks and benefits explained. Current VIS given.       Immunizations Administered     Name Date Dose Route    Influenza, Quadv, IM, PF (6 mo and older Fluzone, Flulaval, Fluarix, and 3 yrs and older Afluria) 10/22/2020 0.5 mL Intramuscular    Site: Deltoid- Right    Lot: [id number]    NDC: 30087-643-81

## 2020-12-08 RX ORDER — METFORMIN HYDROCHLORIDE 500 MG/1
TABLET, EXTENDED RELEASE ORAL
Qty: 270 TABLET | Refills: 1 | Status: SHIPPED | OUTPATIENT
Start: 2020-12-08 | End: 2021-08-16

## 2020-12-18 ENCOUNTER — OFFICE VISIT (OUTPATIENT)
Dept: FAMILY MEDICINE CLINIC | Age: 65
End: 2020-12-18
Payer: COMMERCIAL

## 2020-12-18 VITALS
HEIGHT: 70 IN | WEIGHT: 211 LBS | BODY MASS INDEX: 30.21 KG/M2 | DIASTOLIC BLOOD PRESSURE: 78 MMHG | TEMPERATURE: 97.7 F | HEART RATE: 70 BPM | SYSTOLIC BLOOD PRESSURE: 128 MMHG | OXYGEN SATURATION: 98 %

## 2020-12-18 DIAGNOSIS — I10 ESSENTIAL HYPERTENSION: ICD-10-CM

## 2020-12-18 DIAGNOSIS — E11.9 CONTROLLED TYPE 2 DIABETES MELLITUS WITHOUT COMPLICATION, WITHOUT LONG-TERM CURRENT USE OF INSULIN (HCC): ICD-10-CM

## 2020-12-18 PROBLEM — I20.89 ANGINA AT REST: Status: RESOLVED | Noted: 2019-04-16 | Resolved: 2020-12-18

## 2020-12-18 PROBLEM — I20.8 ANGINA AT REST (HCC): Status: RESOLVED | Noted: 2019-04-16 | Resolved: 2020-12-18

## 2020-12-18 LAB
ANION GAP SERPL CALCULATED.3IONS-SCNC: 11 MMOL/L (ref 3–16)
BUN BLDV-MCNC: 20 MG/DL (ref 7–20)
CALCIUM SERPL-MCNC: 9.6 MG/DL (ref 8.3–10.6)
CHLORIDE BLD-SCNC: 102 MMOL/L (ref 99–110)
CO2: 26 MMOL/L (ref 21–32)
CREAT SERPL-MCNC: 0.9 MG/DL (ref 0.8–1.3)
CREATININE URINE: 87.9 MG/DL (ref 39–259)
GFR AFRICAN AMERICAN: >60
GFR NON-AFRICAN AMERICAN: >60
GLUCOSE BLD-MCNC: 119 MG/DL (ref 70–99)
MICROALBUMIN UR-MCNC: <1.2 MG/DL
MICROALBUMIN/CREAT UR-RTO: NORMAL MG/G (ref 0–30)
POTASSIUM SERPL-SCNC: 4.6 MMOL/L (ref 3.5–5.1)
SODIUM BLD-SCNC: 139 MMOL/L (ref 136–145)

## 2020-12-18 PROCEDURE — 99214 OFFICE O/P EST MOD 30 MIN: CPT | Performed by: FAMILY MEDICINE

## 2020-12-18 ASSESSMENT — ENCOUNTER SYMPTOMS
ALLERGIC/IMMUNOLOGIC NEGATIVE: 1
GASTROINTESTINAL NEGATIVE: 1
RESPIRATORY NEGATIVE: 1
EYES NEGATIVE: 1

## 2020-12-18 NOTE — PROGRESS NOTES
Subjective:      Saqib Reyna is a 72 y.o.  male who presents to the office today for a preoperative consultation at the request of surgeon Dr Justin Aguirre who plans on performing bilat eyelid blepharoplasty on January 5. Duration of problem: chronic eyelid droop    Assoc sx are: affects vision       Alleviating factors are: diffcult to keep using ocular muscles to elelvate upper lids. This consultation is requested for thespecific conditions prompting preoperative evaluation (i.e. because of potential affect on operative risk):     DM-2: has excellent control. Only on metformin. No complications. Lab Results   Component Value Date    LABA1C 6.8 08/24/2020    LABA1C 6.7 02/24/2020    LABA1C 6.2 02/14/2019      HTN: takes lotensin and cardizem (also for afib). No hx of CAD, TIA, CVA or PVD. Last renal function test:   Lab Results   Component Value Date     05/13/2020    K 4.5 05/13/2020    K 4.7 04/16/2019    BUN 19 05/13/2020    CREATININE 0.99 05/13/2020        Hx parox afib: cardiologist is Dr Madina Luther (normally sees NP's at [facility]) who is now retired. On eliquis for anticoag. Had one episode of afib 2 yrs ago that he knows of, vut monitor showed asymtpoamtic afib periodically. No known CVD. Cardiac fxn good. Echo done 7/20:   Summary   Normal left ventricle size, wall thickness and systolic function with an   estimated ejection fraction of 55-60%. No regional wall motion abnormalities are seen. Normal diastolic function. The ascending aorta is mildly dilated at 4.3 cm. Mild aortic regurgitation. Trivial tricuspid regurgitation. PASP of 25 mmHg.   ------------------------------------------------------------------   Electronically signed by Jamie Sanchez DO [facility]   (Interpreting physician) on 07/21/2020 at 11:40 AM    Uses nexium daily for GERD.     Patient Active Problem List   Diagnosis    Kidney stone on right side-(7/11)  ED (erectile dysfunction) of organic origin    Colonic polyps(COLO 2009--REPEAT 1/15-polyp x 1-ghastine    Hyperglycemia    Obesity-advised diet/exercise    Anisocoria-right eye--chronic     H/O low back pain,Mild     Medial meniscus tear,Right    GERD (gastroesophageal reflux disease)--on daily ppi    Essential hypertension    Closed fracture of radius    Carpal tunnel syndrome of left wrist    Controlled type 2 diabetes mellitus without complication, without long-term current use of insulin (HCC)    Dyslipidemia declines medication  8/18    Basal cell carcinoma (BCC) of skin of nose dr Justin Leong    Atrial fibrillation with RVR (HCC)    Angina at rest [facility])    Alcohol use    PAF (paroxysmal atrial fibrillation) ([facility])        Planned anesthesia is IV sedation, MAC. The patient has the following knownanesthesia issues: none. Patient has a bleeding risk of : uses Eliquis 5 mg. Past Medical History:   Diagnosis Date    Arthritis     Dyslipidemia     H/O low back pain     Mild     Hypertension     Kidney stone      Past Surgical History:   Procedure Laterality Date    BLEPHAROPLASTY      Bilateral     JOINT REPLACEMENT      surgery no replacement    KNEE ARTHROSCOPY Left 2/5/2014    LEG SURGERY      x 2-Right     NECK SURGERY      herniated disc march 5, 2013    OTHER SURGICAL HISTORY      Ruptured Disc     OTHER SURGICAL HISTORY Left 07/2016    orif--left radial fx    ROTATOR CUFF REPAIR      Left x 2      History reviewed. No pertinent family history.   Current Outpatient Medications   Medication Sig Dispense Refill    metFORMIN (GLUCOPHAGE-XR) 500 MG extended release tablet TAKE 1 TABLET EVERY MORNINGAND 2 TABLETS EVERY EVENING 270 tablet 1    ELIQUIS 5 MG TABS tablet TAKE 1 TABLET TWICE A  tablet 1    dilTIAZem (CARDIZEM CD) 240 MG extended release capsule Take 1 capsule by mouth daily 90 capsule 2  benazepril (LOTENSIN) 40 MG tablet Take 1 tablet by mouth daily 90 tablet 2    blood glucose test strips (ONE TOUCH ULTRA TEST) strip TEST BLOOD SUGAR  Up to  Twice a day as needed. 100 strip 3    ONETOUCH DELICA LANCETS FINE MISC 3 each by Does not apply route 3 times daily 300 each 3    Blood Glucose Monitoring Suppl (ONE TOUCH ULTRA 2) w/Device KIT 1 kit by Other route 3 times daily 1 kit 3    Esomeprazole Magnesium (NEXIUM PO) Take by mouth       No current facility-administered medications for this visit. Allergies   Allergen Reactions    Sulfa Antibiotics Rash     Believes could have been from an ATX     Social History     Occupational History    Occupation: Tech @ [employer]      Employer: [employer]    Occupation: Retired 2014   Tobacco Use    Smoking status: Former Smoker     Packs/day: 0.50     Years: 40.00     Pack years: 20.00     Start date: 1976     Quit date: 2016     Years since quittin.9    Smokeless tobacco: Never Used    Tobacco comment: smokes cigars 1-2 a week   Substance and Sexual Activity    Alcohol use: Yes     Alcohol/week: 3.0 standard drinks     Types: 3 Cans of beer per week     Comment: much less since dx of diabetes    Drug use: No    Sexual activity: Not on file       Review of Systems   Constitutional: Negative. HENT: Negative. Eyes: Negative. Respiratory: Negative. Cardiovascular: Negative. Gastrointestinal: Negative. Endocrine: Negative. Genitourinary: Negative. Musculoskeletal: Negative. Skin: Negative. Allergic/Immunologic: Negative. Neurological: Negative. Hematological: Negative. Psychiatric/Behavioral: Negative. Objective:     Vitals:    20 0956   Temp: 97.7 °F (36.5 °C)   TempSrc: Temporal   Weight: 211 lb (95.7 kg)   Height: 5' 10\" (1.778 m)     Physical Exam  Vitals signs and nursing note reviewed. Constitutional:       General: He is not in acute distress. Appearance: Normal appearance. He is well-developed. He is not diaphoretic. HENT:      Head: Normocephalic and atraumatic. Right Ear: Tympanic membrane, ear canal and external ear normal.      Left Ear: Tympanic membrane, ear canal and external ear normal.   Eyes:      General: No scleral icterus. Right eye: No discharge. Left eye: No discharge. Conjunctiva/sclera: Conjunctivae normal.      Pupils: Pupils are equal, round, and reactive to light. Comments: bilat eyelids droop   Neck:      Musculoskeletal: Normal range of motion and neck supple. Cardiovascular:      Rate and Rhythm: Normal rate and regular rhythm. Pulses: Normal pulses. Heart sounds: Normal heart sounds. No murmur. Pulmonary:      Effort: Pulmonary effort is normal. No respiratory distress. Breath sounds: Normal breath sounds. No wheezing or rales. Abdominal:      General: Bowel sounds are normal. There is no distension. Palpations: Abdomen is soft. There is no mass. Tenderness: There is no abdominal tenderness. There is no guarding or rebound. Hernia: No hernia is present. Musculoskeletal:         General: No swelling. Right lower leg: No edema. Left lower leg: No edema. Lymphadenopathy:      Cervical: No cervical adenopathy. Skin:     General: Skin is warm and dry. Capillary Refill: Capillary refill takes less than 2 seconds. Neurological:      General: No focal deficit present. Mental Status: He is alert and oriented to person, place, and time. Mental status is at baseline. Psychiatric:         Mood and Affect: Mood normal.         Behavior: Behavior normal.         Thought Content: Thought content normal.         Judgment: Judgment normal.         Cardiographics  ECG: prior EKG showed sinus miguel. Echocardiogram: see above. Lab Review   No visits with results within 2 Month(s) from this visit.    Latest known visit with results is: Office Visit on 08/24/2020   Component Date Value    Hemoglobin A1C 08/24/2020 6.8           Assessment:     72 y.o. y.o. male with planned surgery as above. 1. Preop examination  - cleared for surgery pending cardiac clearance from his cardiologist    2. Ptosis of both eyelids  - proceed with surgery    3. Gastroesophageal reflux disease, unspecified whether esophagitis present  - advised to take nexium on am of surgery with sips of water    4. Essential hypertension  - Blood pressure is at goal on current therapy. Advised to hold benazepril on am of surgery, but to take the diltiazem. - BASIC METABOLIC PANEL; Future    5. PAF (paroxysmal atrial fibrillation) ([facility] [address]  - says he was advised by cardiology to hold elaquis for 3 days prior to surgery. Will request formal cardiac clearance. 6. Controlled type 2 diabetes mellitus without complication, without long-term current use of insulin (HCC)  - Stable; continue metformin. May hold the AM of surgery. Cardiac Risk Estimation: per the Revised Cardiac Risk Index (Circ. 100:1043, 1999), the patient's risk factors for cardiaccomplications include none, putting him/her in: RCI RISK CLASS I (0 risk factors, risk of major cardiac compl. appr. 0.5%)     : Ok to proceed with the procedure. Low cardiac risk. Pt.advised to stop all Rx except Nexium and Cardizem the a.m. of surgery. Patient advised to hold blood thinning medication 7 days prior to procedure, including nsaids. Stop elaquis 3 days prior to surgery. Prophylaxis for cardiac events with perioperative beta-blockers: not indicated    Deep vein thrombosis prophylaxis postoperatively: regimen to be chosen by surgical team if applicable. Efren Walker M.D. [address] [place] [address].  [id number] [place] [address]  Phone [phone]  Fax      [phone]

## 2020-12-18 NOTE — PATIENT INSTRUCTIONS
Ok to proceed with the procedure. Low cardiac risk. Pt.advised to stop all Rx except Nexium and Cardizem (diltiazem) the a.m. of surgery.

## 2020-12-18 NOTE — Clinical Note
Needs formal cardiac clearance/note for afib managament perioperatively. Did not do new EKG today. We can fax your note to [facility] if you will let my staff know when it is done. Thanks.

## 2020-12-19 LAB
ESTIMATED AVERAGE GLUCOSE: 134.1 MG/DL
HBA1C MFR BLD: 6.3 %

## 2021-01-11 RX ORDER — BENAZEPRIL HYDROCHLORIDE 40 MG/1
TABLET, FILM COATED ORAL
Qty: 90 TABLET | Refills: 2 | Status: SHIPPED | OUTPATIENT
Start: 2021-01-11 | End: 2021-12-21

## 2021-01-22 NOTE — TELEPHONE ENCOUNTER
Called pt and replied to email msg by phone made OV with EP and continue current meds Quality 265: Biopsy Follow-Up: Biopsy results reviewed, communicated, tracked, and documented Quality 431: Preventive Care And Screening: Unhealthy Alcohol Use - Screening: Patient screened for unhealthy alcohol use using a single question and scores less than 2 times per year Quality 110: Preventive Care And Screening: Influenza Immunization: Influenza Immunization Administered during Influenza season Quality 47: Advance Care Plan: Advance care planning not documented, reason not otherwise specified. Quality 128: Preventive Care And Screening: Body Mass Index (Bmi) Screening And Follow-Up Plan: BMI not documented, reason not otherwise specified. Quality 226: Preventive Care And Screening: Tobacco Use: Screening And Cessation Intervention: Patient screened for tobacco use and is an ex/non-smoker Quality 111:Pneumonia Vaccination Status For Older Adults: Pneumococcal Vaccination Previously Received Detail Level: Detailed Quality 130: Documentation Of Current Medications In The Medical Record: Current Medications Documented

## 2021-02-17 LAB
CHOLESTEROL, TOTAL: 123 MG/DL
CHOLESTEROL/HDL RATIO: 2.9
HDLC SERPL-MCNC: 42 MG/DL (ref 35–70)
LDL CHOLESTEROL CALCULATED: 63 MG/DL (ref 0–160)
NONHDLC SERPL-MCNC: 81 MG/DL
TRIGL SERPL-MCNC: 94 MG/DL
VLDLC SERPL CALC-MCNC: NORMAL MG/DL

## 2021-02-24 ENCOUNTER — IMMUNIZATION (OUTPATIENT)
Dept: PRIMARY CARE CLINIC | Age: 66
End: 2021-02-24
Payer: MEDICARE

## 2021-02-24 ENCOUNTER — OFFICE VISIT (OUTPATIENT)
Dept: FAMILY MEDICINE CLINIC | Age: 66
End: 2021-02-24
Payer: COMMERCIAL

## 2021-02-24 VITALS
BODY MASS INDEX: 30.78 KG/M2 | HEART RATE: 75 BPM | TEMPERATURE: 97 F | DIASTOLIC BLOOD PRESSURE: 74 MMHG | HEIGHT: 70 IN | SYSTOLIC BLOOD PRESSURE: 118 MMHG | WEIGHT: 215 LBS | RESPIRATION RATE: 14 BRPM | OXYGEN SATURATION: 96 %

## 2021-02-24 DIAGNOSIS — I48.91 ATRIAL FIBRILLATION WITH RVR (HCC): ICD-10-CM

## 2021-02-24 DIAGNOSIS — I10 ESSENTIAL HYPERTENSION: ICD-10-CM

## 2021-02-24 DIAGNOSIS — R31.0 GROSS HEMATURIA: Primary | ICD-10-CM

## 2021-02-24 DIAGNOSIS — E11.9 CONTROLLED TYPE 2 DIABETES MELLITUS WITHOUT COMPLICATION, WITHOUT LONG-TERM CURRENT USE OF INSULIN (HCC): ICD-10-CM

## 2021-02-24 DIAGNOSIS — E78.5 DYSLIPIDEMIA: ICD-10-CM

## 2021-02-24 LAB
BASOPHILS ABSOLUTE: 0 K/UL (ref 0–0.2)
BASOPHILS RELATIVE PERCENT: 0.7 %
BILIRUBIN, POC: NORMAL
BLOOD URINE, POC: NORMAL
CLARITY, POC: CLEAR
COLOR, POC: YELLOW
EOSINOPHILS ABSOLUTE: 0.1 K/UL (ref 0–0.6)
EOSINOPHILS RELATIVE PERCENT: 2.2 %
GLUCOSE URINE, POC: NORMAL
HBA1C MFR BLD: 6.3 %
HCT VFR BLD CALC: 44.2 % (ref 40.5–52.5)
HEMOGLOBIN: 15.1 G/DL (ref 13.5–17.5)
KETONES, POC: NORMAL
LEUKOCYTE EST, POC: NORMAL
LYMPHOCYTES ABSOLUTE: 1.6 K/UL (ref 1–5.1)
LYMPHOCYTES RELATIVE PERCENT: 26.1 %
MCH RBC QN AUTO: 32.9 PG (ref 26–34)
MCHC RBC AUTO-ENTMCNC: 34 G/DL (ref 31–36)
MCV RBC AUTO: 96.7 FL (ref 80–100)
MONOCYTES ABSOLUTE: 0.4 K/UL (ref 0–1.3)
MONOCYTES RELATIVE PERCENT: 5.9 %
NEUTROPHILS ABSOLUTE: 3.9 K/UL (ref 1.7–7.7)
NEUTROPHILS RELATIVE PERCENT: 65.1 %
NITRITE, POC: NORMAL
PDW BLD-RTO: 13.4 % (ref 12.4–15.4)
PH, POC: 7
PLATELET # BLD: 216 K/UL (ref 135–450)
PMV BLD AUTO: 9.5 FL (ref 5–10.5)
PROTEIN, POC: NORMAL
RBC # BLD: 4.57 M/UL (ref 4.2–5.9)
SPECIFIC GRAVITY, POC: 1.02
UROBILINOGEN, POC: 2
WBC # BLD: 6 K/UL (ref 4–11)

## 2021-02-24 PROCEDURE — 81002 URINALYSIS NONAUTO W/O SCOPE: CPT | Performed by: INTERNAL MEDICINE

## 2021-02-24 PROCEDURE — 91300 COVID-19, PFIZER VACCINE 30MCG/0.3ML DOSE: CPT | Performed by: FAMILY MEDICINE

## 2021-02-24 PROCEDURE — 83036 HEMOGLOBIN GLYCOSYLATED A1C: CPT | Performed by: INTERNAL MEDICINE

## 2021-02-24 PROCEDURE — 0001A COVID-19, PFIZER VACCINE 30MCG/0.3ML DOSE: CPT | Performed by: FAMILY MEDICINE

## 2021-02-24 PROCEDURE — 99214 OFFICE O/P EST MOD 30 MIN: CPT | Performed by: INTERNAL MEDICINE

## 2021-02-24 ASSESSMENT — ENCOUNTER SYMPTOMS
NAUSEA: 0
DIARRHEA: 0
SHORTNESS OF BREATH: 0
COUGH: 0

## 2021-02-24 NOTE — PROGRESS NOTES
2021    Chief Complaint   Patient presents with    Diabetes       HPI  DM   130 in the am  Rarely checking after dinner  metformin  Lab Results   Component Value Date    LABA1C 6.3 2021    LABA1C 6.3 2020    LABA1C 6.8 2020       HTN  On lotensin  Not cking at home  No headache or dizziness, cardizem     Gets first covid vaccine    Has ho afib   On eliquis     Noticed blood in the urine yesterday pink/red  Noticed it yesterday morning  Happened twice. Ho kidney stones  Passed both       Review of Systems   Constitutional: Negative for appetite change and unexpected weight change. Respiratory: Negative for cough and shortness of breath. Gastrointestinal: Negative for diarrhea and nausea. Genitourinary: Positive for hematuria. Negative for dysuria. Neurological: Negative for dizziness and light-headedness. Health Maintenance   Topic Date Due    Hepatitis C screen  1955    HIV screen  1970    COVID-19 Vaccine (1 of 2) 1971    Lipid screen  2020    Diabetic foot exam  2021    Diabetic retinal exam  10/15/2021    Diabetic microalbuminuria test  2021    Potassium monitoring  2021    Creatinine monitoring  2021    A1C test (Diabetic or Prediabetic)  2022    Pneumococcal 65+ years Vaccine (1 of 1 - PPSV23) 01/10/2023    Colon cancer screen colonoscopy  2025    DTaP/Tdap/Td vaccine (3 - Td) 2030    Flu vaccine  Completed    Shingles Vaccine  Completed    AAA screen  Completed    Hepatitis A vaccine  Aged Out    Hib vaccine  Aged Out    Meningococcal (ACWY) vaccine  Aged Out      Social History     Tobacco Use    Smoking status: Former Smoker     Packs/day: 0.50     Years: 40.00     Pack years: 20.00     Start date: 1976     Quit date: 2016     Years since quittin.1    Smokeless tobacco: Never Used    Tobacco comment: smokes cigars 1-2 a week   Substance Use Topics    Alcohol use:  Yes Alcohol/week: 3.0 standard drinks     Types: 3 Cans of beer per week     Comment: much less since dx of diabetes    Drug use: No      History reviewed. No pertinent family history. Prior to Visit Medications    Medication Sig Taking? Authorizing Provider   benazepril (LOTENSIN) 40 MG tablet TAKE 1 TABLET DAILY Yes GAL Jones CNP   metFORMIN (GLUCOPHAGE-XR) 500 MG extended release tablet TAKE 1 TABLET EVERY MORNINGAND 2 TABLETS EVERY EVENING Yes Olena Baker MD   ELIQUIS 5 MG TABS tablet TAKE 1 TABLET TWICE A DAY Yes GAL Jones CNP   dilTIAZem (CARDIZEM CD) 240 MG extended release capsule Take 1 capsule by mouth daily Yes GAL Jones CNP   blood glucose test strips (ONE TOUCH ULTRA TEST) strip TEST BLOOD SUGAR  Up to  Twice a day as needed.  Yes Olena Baker MD   [facility] LANCETS FINE MISC 3 each by Does not apply route 3 times daily Yes Olena Baker MD   Blood Glucose Monitoring Suppl (ONE TOUCH ULTRA 2) w/Device KIT 1 kit by Other route 3 times daily Yes Olena Baker MD   Esomeprazole Magnesium (NEXIUM PO) Take by mouth Yes Historical Provider, MD     Patient Active Problem List   Diagnosis    Kidney stone on right side-(7/11)    ED (erectile dysfunction) of organic origin    Colonic polyps(COLO 2009--REPEAT 1/15-polyp x 1-ghastine    Obesity-advised diet/exercise    Anisocoria-right eye--chronic     H/O low back pain,Mild     Medial meniscus tear,Right    GERD (gastroesophageal reflux disease)--on daily ppi    Essential hypertension    Closed fracture of radius    Carpal tunnel syndrome of left wrist    Controlled type 2 diabetes mellitus without complication, without long-term current use of insulin (HCC)    Dyslipidemia declines medication  8/18    Basal cell carcinoma (BCC) of skin of nose dr Kika Hall Atrial fibrillation with RVR ([facility] [place] [address]    Alcohol use    PAF (paroxysmal atrial fibrillation) ([facility] [address]        LABS:   Lab Results   Component Value Date    GLUCOSE 119 (H) 12/18/2020     Lab Results   Component Value Date     12/18/2020    K 4.6 12/18/2020    CREATININE 0.9 12/18/2020     Cholesterol, Total   Date Value Ref Range Status   05/03/2018 144 <200 mg/dL Final   05/03/2018 144 <200 mg/dL Final     Cholesterol   Date Value Ref Range Status   05/03/2018 98 <130 mg/dL (calc) Final     Comment:     For patients with diabetes plus 1 major ASCVD risk  factor, treating to a non-HDL-C goal of <100 mg/dL  (LDL-C of <70 mg/dL) is considered a therapeutic  option. 05/03/2018 98 <130 mg/dL (calc) Final     Comment:     For patients with diabetes plus 1 major ASCVD risk  factor, treating to a non-HDL-C goal of <100 mg/dL  (LDL-C of <70 mg/dL) is considered a therapeutic  option. LDL Calculated   Date Value Ref Range Status   04/17/2019 67 <100 mg/dL Final     HDL   Date Value Ref Range Status   04/17/2019 43 40 - 60 mg/dL Final   08/19/2010 40 40 - 60 mg/dl Final     Triglycerides   Date Value Ref Range Status   05/03/2018 140 <150 mg/dL Final   05/03/2018 140 <150 mg/dL Final     Lab Results   Component Value Date    ALT 36 05/13/2020    AST 24 05/13/2020    ALKPHOS 68 05/13/2020    BILITOT 0.7 05/13/2020      Lab Results   Component Value Date    WBC 8.2 04/17/2019    HGB 14.3 04/17/2019    HCT 42.1 04/17/2019    MCV 98.3 04/17/2019     04/17/2019     TSH (uIU/mL)   Date Value   04/16/2019 0.71     Lab Results   Component Value Date    LABA1C 6.3 02/24/2021     Lab Results   Component Value Date    PSA 1.0 08/13/2018    PSA 0.77 10/28/2013        PHYSICAL EXAM:  /74   Pulse 75   Temp 97 °F (36.1 °C)   Resp 14   Ht 5' 10\" (1.778 m)   Wt 215 lb (97.5 kg)   SpO2 96%   BMI 30.85 kg/m²    Physical Exam  Constitutional:       Appearance: Normal appearance. He is well-developed. HENT:      Head: Normocephalic and atraumatic. Cardiovascular:      Rate and Rhythm: Normal rate and regular rhythm.       Heart sounds: Normal heart sounds. No murmur. Pulmonary:      Effort: Pulmonary effort is normal.      Breath sounds: Normal breath sounds. No wheezing. Abdominal:      Palpations: Abdomen is soft. Tenderness: There is no abdominal tenderness. Skin:     General: Skin is warm and dry. Neurological:      Mental Status: He is alert. Psychiatric:         Mood and Affect: Mood normal.         Behavior: Behavior normal.         Thought Content: Thought content normal.         Judgment: Judgment normal.       BP Readings from Last 5 Encounters:   02/24/21 118/74   12/18/20 128/78   08/24/20 132/82   05/21/20 (!) 142/72   02/24/20 122/84       Wt Readings from Last 5 Encounters:   02/24/21 215 lb (97.5 kg)   12/18/20 211 lb (95.7 kg)   08/24/20 222 lb 9.6 oz (101 kg)   05/21/20 225 lb (102.1 kg)   02/24/20 232 lb 6.4 oz (105.4 kg)      Nereida Nissa was seen today for diabetes.     Diagnoses and all orders for this visit:    Gross hematuria  -     Amb External Referral To Urology  -     POCT Urinalysis no Micro    Controlled type 2 diabetes mellitus without complication, without long-term current use of insulin ([facility])  -     POCT glycosylated hemoglobin (Hb A1C)    Essential hypertension    Dyslipidemia declines medication  8/18    Atrial fibrillation with RVR ([facility])  -     CBC Auto Differential; Future    had episode of blood in the urine twice  Had pinkish /reddish color  Resolved  Ho kidney stones  On eliquis   bp good   Dm good control

## 2021-03-03 ENCOUNTER — TELEPHONE (OUTPATIENT)
Dept: FAMILY MEDICINE CLINIC | Age: 66
End: 2021-03-03

## 2021-03-17 ENCOUNTER — IMMUNIZATION (OUTPATIENT)
Dept: PRIMARY CARE CLINIC | Age: 66
End: 2021-03-17
Payer: MEDICARE

## 2021-03-17 PROCEDURE — 0002A COVID-19, PFIZER VACCINE 30MCG/0.3ML DOSE: CPT | Performed by: FAMILY MEDICINE

## 2021-03-17 PROCEDURE — 91300 COVID-19, PFIZER VACCINE 30MCG/0.3ML DOSE: CPT | Performed by: FAMILY MEDICINE

## 2021-05-27 ENCOUNTER — OFFICE VISIT (OUTPATIENT)
Dept: CARDIOLOGY CLINIC | Age: 66
End: 2021-05-27
Payer: MEDICARE

## 2021-05-27 VITALS
BODY MASS INDEX: 30.54 KG/M2 | OXYGEN SATURATION: 96 % | WEIGHT: 213.3 LBS | SYSTOLIC BLOOD PRESSURE: 120 MMHG | DIASTOLIC BLOOD PRESSURE: 66 MMHG | HEART RATE: 59 BPM | HEIGHT: 70 IN

## 2021-05-27 DIAGNOSIS — I77.810 AORTIC ROOT DILATION (HCC): ICD-10-CM

## 2021-05-27 DIAGNOSIS — I10 ESSENTIAL HYPERTENSION: ICD-10-CM

## 2021-05-27 DIAGNOSIS — I48.0 PAF (PAROXYSMAL ATRIAL FIBRILLATION) (HCC): Primary | ICD-10-CM

## 2021-05-27 PROCEDURE — 99214 OFFICE O/P EST MOD 30 MIN: CPT | Performed by: NURSE PRACTITIONER

## 2021-05-27 NOTE — PROGRESS NOTES
[address]     Outpatient Follow Up Note    Martha Lance is 72 y.o. male who presents today with a history of dilated AoR, PAF and HTN. CHIEF COMPLAINT / HPI:  Follow Up secondary to paroxysmal atrial fibrillation    Subjective:   he denies significant chest pain. There is no SOB/YANEZ. The patient denies orthopnea/PND. The patient does not have swelling. The patients weight is stable . The patient is not experiencing palpitations or dizziness. These symptoms show no change since the last OV. With regard to medication therapy the patient has been compliant with prescribed regimen. They have tolerated therapy to date. Past Medical History:   Diagnosis Date    Arthritis     Dyslipidemia     H/O low back pain     Mild     Hypertension     Kidney stone      Social History:    Social History     Tobacco Use   Smoking Status Former Smoker    Packs/day: 0.50    Years: 40.00    Pack years: 20.00    Start date: 1976   Gabyelsy Tyson Quit date: 2016    Years since quittin.3   Smokeless Tobacco Never Used   Tobacco Comment    smokes cigars 1-2 a week     Current Medications:  Current Outpatient Medications   Medication Sig Dispense Refill    ELIQUIS 5 MG TABS tablet TAKE 1 TABLET TWICE A  tablet 0    dilTIAZem (CARDIZEM CD) 240 MG extended release capsule TAKE 1 CAPSULE DAILY. 90 capsule 0    benazepril (LOTENSIN) 40 MG tablet TAKE 1 TABLET DAILY 90 tablet 2    metFORMIN (GLUCOPHAGE-XR) 500 MG extended release tablet TAKE 1 TABLET EVERY MORNINGAND 2 TABLETS EVERY EVENING 270 tablet 1    blood glucose test strips (ONE TOUCH ULTRA TEST) strip TEST BLOOD SUGAR  Up to  Twice a day as needed.  100 strip 3    ONETOUCH DELICA LANCETS FINE MISC 3 each by Does not apply route 3 times daily 300 each 3    Blood Glucose Monitoring Suppl (ONE TOUCH ULTRA 2) w/Device KIT 1 kit by Other route 3 times daily 1 kit 3    Esomeprazole Magnesium (NEXIUM PO) Take by mouth       No current facility-administered medications for this visit. REVIEW OF SYSTEMS:    CONSTITUTIONAL: No major weight gain or loss, fatigue, weakness, night sweats or fever. HEENT: No new vision difficulties or ringing in the ears. RESPIRATORY: No new SOB, PND, orthopnea or cough. CARDIOVASCULAR: See HPI  GI: No nausea, vomiting, diarrhea, constipation, abdominal pain or changes in bowel habits. : No urinary frequency, urgency, incontinence; + hematuria in Feb '21 >> scoped with neg results (initially woke with blood urine which lightened up as the day progressed. He saw his PCP the following day which was a scheduled visit)  SKIN: No cyanosis or skin lesions. MUSCULOSKELETAL: No new muscle or joint pain. NEUROLOGICAL: No syncope or TIA-like symptoms. PSYCHIATRIC: No anxiety, pain, insomnia or depression    Objective:   PHYSICAL EXAM:    Vitals:    05/27/21 0916 05/27/21 0929   BP: 120/62 120/66   Site: Right Upper Arm Right Upper Arm   Position: Sitting    Cuff Size: Large Adult Large Adult   Pulse: 59    SpO2: 96%    Weight: 213 lb 4.8 oz (96.8 kg)    Height: 5' 10\" (1.778 m)          VITALS:  /62 (Site: Right Upper Arm, Position: Sitting, Cuff Size: Large Adult)   Pulse 59   Ht 5' 10\" (1.778 m)   Wt 213 lb 4.8 oz (96.8 kg)   SpO2 96%   BMI 30.61 kg/m²   CONSTITUTIONAL: Cooperative, no apparent distress, and appears well nourished / developed  NEUROLOGIC:  Awake and orientated to person, place and time. PSYCH: Calm affect. SKIN: Warm and dry. HEENT: Sclera non-icteric, normocephalic, neck supple, no elevation of JVP, normal carotid pulses with no bruits and thyroid normal size. LUNGS:  No increased work of breathing and clear to auscultation, no crackles or wheezing  CARDIOVASCULAR:  Regular rate 60 and rhythm with no murmurs, gallops, rubs, or abnormal heart sounds, normal PMI. The apical impulses not displaced  JVP less than 8 cm H2O  Heart tones are crisp and normal  Cervical veins are not engorged  The carotid upstroke is normal in amplitude and contour without delay or bruit  JVP is not elevated  ABDOMEN:  Normal bowel sounds, non-distended and non-tender to palpation  EXT: No edema, no calf tenderness. Pulses are present bilaterally. DATA:    Lab Results   Component Value Date    ALT 22 05/19/2021    AST 17 05/19/2021    ALKPHOS 102 05/19/2021    BILITOT 0.4 05/19/2021     Lab Results   Component Value Date    CREATININE 0.78 05/19/2021    BUN 23 05/19/2021     05/19/2021    K 4.5 05/19/2021     05/19/2021    CO2 23 05/19/2021     Lab Results   Component Value Date    TSH 0.71 04/16/2019     No components found for: CHLPL  Lab Results   Component Value Date    TRIG 140 05/03/2018    TRIG 140 05/03/2018    TRIG 232 (H) 05/15/2017     Lab Results   Component Value Date    HDL 43 04/17/2019    HDL 46 05/03/2018    HDL 46 05/03/2018     Lab Results   Component Value Date    LDLCALC 67 04/17/2019    [address] 05/03/2018    [address] 05/03/2018     Lab Results   Component Value Date    LABVLDL 31 04/17/2019    LABVLDL 27 08/19/2010     Radiology Review:  Pertinent images / reports were reviewed as a part of this visit and reveals the following:    [id number] Score for Atrial Fibrillation Stroke Risk   Risk   Factors  Component Value   C CHF No 0   H HTN Yes 1   A2 Age >= 76 No,  (72 y.o.) 0   D DM Yes 1   S2 Prior Stroke/TIA No 0   V Vascular Disease No 0   A Age 74-69 Yes,  (66 y.o.) 1   Sc Sex male 0    [id number]  Score  3   Score last updated 5/27/21 9:31 AM EDT       Echo: April '19:  Summary   Mod. Conc. LVH; Estimated ejection fraction is 60%. The left atrium is moderately dilated. Mild aortic regurgitation is present. The ascending aorta is mildly dilated at  4.2 cm. Trivial Pulmonic and Tricuspid Regurgitation.     Event Monitor: 5/14 -6/12/19: AF RVR      avg HR 69 min 31 max 146      AF burden < 1%      Echo: July '20:  Summary   Normal left ventricle size, wall thickness and systolic function with an   estimated ejection fraction of 55-60%. No regional wall motion abnormalities are seen. Normal diastolic function. The ascending aorta is mildly dilated at 4.3 cm. Mild aortic regurgitation. Trivial tricuspid regurgitation. PASP of 25 mmHg. Assessment:      Diagnosis Orders   1. PAF (paroxysmal atrial fibrillation) (HCC)   ~stable : offers no c/o palpitations   ~diltiazem with DOAC ; reports 1 episode of hematuria seen/eval by urology  ~LA mod dilated on echo   ~CHADs 2  ~AF burden < 1% on Event monitor '19        2. Essential hypertension   ~controlled    3. Aortic root dilation (HCC)   ~mildly dilated at 4.3 cm on echo from July '20 compared to 4.2 cm in '19  ~BP controlled       I had the opportunity to review the clinical symptoms and presentation of Alfredo White. Plan:     1. Echo : surveillance AoR (ideally every year ; plan once reaches deductible with insurance)  2. F/U in one year    Overall the patient is stable from CV standpoint    I have addresed the patient's cardiac risk factors and adjusted pharmacologic treatment as needed. In addition, I have reinforced the need for patient directed risk factor modification. Further evaluation will be based upon the patient's clinical course and testing results. All questions and concerns were addressed to the patient. Alternatives to my treatment were discussed. The patient is not currently smoking. The risks related to smoking were reviewed with the patient. Recommend maintaining a smoke-free lifestyle    Patient is not on a beta-blocker : neg MI  Patient is on an ace-i  Patient is not on a statin : neg CAD / hyperlipidemia     anti-coagulation has been recommended / prescribed for this patient. Education conducted on adverse reactions including bleeding was discussed. The patient verbalizes understanding not to stop medications without discussing with us.     Discussed exercise: 30-60 minutes 7 days/week : work related doing odd jobs post-jail   Discussed diet. Does not check BS routinely ; last A1c 6.3%    Thank you for allowing to us to participate in the care of Jazmyn Patricia.     Krzysztof Russell, APRN    Documentation of today's visit sent to PCP

## 2021-06-16 ENCOUNTER — TELEPHONE (OUTPATIENT)
Dept: PHARMACY | Facility: CLINIC | Age: 66
End: 2021-06-16

## 2021-06-21 NOTE — TELEPHONE ENCOUNTER
Called and spoke with the patient - states that he had his lipid panel collected at [facility] earlier this year. States that his lipid panel was normal and does not think that he needs to be on statin therapy at this time. Patient confirms that he has appt with PCP on 6/23/21 (pre-op) appointment and regular office visit on 8/23/21. Discussed importance of statin therapy briefly - will look closely at the patient's chart and will shantell for f/u around next OV on 8/23/21 and send recommendation to PCP. After ending call with patient - was able to find [facility] labs scanned into the media tab from 2/17/21. Added lipid panel results into Epic. LDL = 63. Will sign off at this time.      Michael Darling, PharmD, [facility]  Direct: [phone]  Department, toll free [phone], option [phone] in place:  No   Recommendation Provided To: Patient/Caregiver: 1 via Telephone   Intervention Detail: New Rx: 1, reason: Needs Additional Therapy   Gap Closed?: No    Total # of Interventions Recommended: 1   Total # of Interventions Accepted: 0   Intervention Accepted By: Patient/Caregiver: 0   Time Spent (min): 15
[facility]: STATIN THERAPY REVIEW  Identified statin use in persons with diabetes care gap per [employer]. Will send to PharmD candidate for review.     Carmen Milian, PharmD, [facility]  Direct: [phone]  Department, toll free [phone], option 7
mmHg      Is BP treated: Yes      HDL Cholesterol: 43 mg/dL      Total Cholesterol: 141 mg/dL     Hyperlipidemia Goal: Patient has a 10-yr ASCVD risk of >7.5% with DM and is therefore a candidate for moderate-intensity statin therapy based on updated guidelines. 2019 ADA Guidelines Age:   72>/= 36years old: Inda Brittle No History of ASCVD or 10-year ASCVD risk > 20% - high-intensity statin is recommended. PLAN  Pt is a good candidate for high statin therapy like : Atorvastatin 40 mg or Rosuvastatin 20 mg daily. Unfortunately Pt has denied therapy in December,2018 per PCP note.  Will reach out to patient and discuss potential benefits of statins therapy for him.     will reach out to patient     Etelvina KELLY candidate 2022  [phone] opt7

## 2021-06-23 ENCOUNTER — OFFICE VISIT (OUTPATIENT)
Dept: FAMILY MEDICINE CLINIC | Age: 66
End: 2021-06-23
Payer: MEDICARE

## 2021-06-23 VITALS
OXYGEN SATURATION: 99 % | DIASTOLIC BLOOD PRESSURE: 72 MMHG | RESPIRATION RATE: 14 BRPM | SYSTOLIC BLOOD PRESSURE: 116 MMHG | HEIGHT: 70 IN | BODY MASS INDEX: 30.75 KG/M2 | HEART RATE: 90 BPM | WEIGHT: 214.8 LBS

## 2021-06-23 DIAGNOSIS — Z78.9 ALCOHOL USE: ICD-10-CM

## 2021-06-23 DIAGNOSIS — I77.810 AORTIC ROOT DILATION (HCC): ICD-10-CM

## 2021-06-23 DIAGNOSIS — I48.0 PAF (PAROXYSMAL ATRIAL FIBRILLATION) (HCC): ICD-10-CM

## 2021-06-23 DIAGNOSIS — I10 ESSENTIAL HYPERTENSION: ICD-10-CM

## 2021-06-23 DIAGNOSIS — Z01.818 PREOP EXAMINATION: Primary | ICD-10-CM

## 2021-06-23 DIAGNOSIS — E11.9 CONTROLLED TYPE 2 DIABETES MELLITUS WITHOUT COMPLICATION, WITHOUT LONG-TERM CURRENT USE OF INSULIN (HCC): ICD-10-CM

## 2021-06-23 LAB — HBA1C MFR BLD: 6.6 %

## 2021-06-23 PROCEDURE — 83036 HEMOGLOBIN GLYCOSYLATED A1C: CPT | Performed by: INTERNAL MEDICINE

## 2021-06-23 PROCEDURE — 99214 OFFICE O/P EST MOD 30 MIN: CPT | Performed by: INTERNAL MEDICINE

## 2021-06-23 SDOH — ECONOMIC STABILITY: FOOD INSECURITY: WITHIN THE PAST 12 MONTHS, YOU WORRIED THAT YOUR FOOD WOULD RUN OUT BEFORE YOU GOT MONEY TO BUY MORE.: NEVER TRUE

## 2021-06-23 SDOH — ECONOMIC STABILITY: FOOD INSECURITY: WITHIN THE PAST 12 MONTHS, THE FOOD YOU BOUGHT JUST DIDN'T LAST AND YOU DIDN'T HAVE MONEY TO GET MORE.: NEVER TRUE

## 2021-06-23 ASSESSMENT — ENCOUNTER SYMPTOMS
VOMITING: 0
NAUSEA: 0
BACK PAIN: 0
SHORTNESS OF BREATH: 0
SINUS PAIN: 0
COUGH: 0

## 2021-06-23 ASSESSMENT — PATIENT HEALTH QUESTIONNAIRE - PHQ9
2. FEELING DOWN, DEPRESSED OR HOPELESS: 0
SUM OF ALL RESPONSES TO PHQ QUESTIONS 1-9: 0
SUM OF ALL RESPONSES TO PHQ9 QUESTIONS 1 & 2: 0
1. LITTLE INTEREST OR PLEASURE IN DOING THINGS: 0

## 2021-06-23 ASSESSMENT — SOCIAL DETERMINANTS OF HEALTH (SDOH): HOW HARD IS IT FOR YOU TO PAY FOR THE VERY BASICS LIKE FOOD, HOUSING, MEDICAL CARE, AND HEATING?: NOT HARD AT ALL

## 2021-06-23 NOTE — PROGRESS NOTES
radial fx    ROTATOR CUFF REPAIR      Left x 2      History reviewed. No pertinent family history. Social History     Socioeconomic History    Marital status:      Spouse name: None    Number of children: 3    Years of education: None    Highest education level: None   Occupational History    Occupation: Tech @ [employer]      Employer: [employer]    Occupation: Retired 2014   Tobacco Use    Smoking status: Former Smoker     Packs/day: 0.50     Years: 40.00     Pack years: 20.00     Start date: 1976     Quit date: 2016     Years since quittin.4    Smokeless tobacco: Never Used    Tobacco comment: smokes cigars 1-2 a week   Substance and Sexual Activity    Alcohol use: Yes     Alcohol/week: 3.0 standard drinks     Types: 3 Cans of beer per week     Comment: much less since dx of diabetes    Drug use: No    Sexual activity: None   Other Topics Concern    None   Social History Narrative    None     Social Determinants of Health     Financial Resource Strain: Low Risk     Difficulty of Paying Living Expenses: Not hard at all   Food Insecurity: No Food Insecurity    Worried About Running Out of Food in the Last Year: Never true    Marium of Food in the Last Year: Never true   Transportation Needs:     Lack of Transportation (Medical):      Lack of Transportation (Non-Medical):    Physical Activity:     Days of Exercise per Week:     Minutes of Exercise per Session:    Stress:     Feeling of Stress :    Social Connections:     Frequency of Communication with Friends and Family:     Frequency of Social Gatherings with Friends and Family:     Attends Hindu Services:     Active Member of Clubs or Organizations:     Attends Club or Organization Meetings:     Marital Status:    Intimate Partner Violence:     Fear of Current or Ex-Partner:     Emotionally Abused:     Physically Abused:     Sexually Abused:      Prior to Visit Medications    Medication Sig Taking? Authorizing Provider   ELIQUIS 5 MG TABS tablet TAKE 1 TABLET TWICE A DAY Yes GAL Hussein CNP   dilTIAZem (CARDIZEM CD) 240 MG extended release capsule TAKE 1 CAPSULE DAILY. Yes GAL Hussein CNP   benazepril (LOTENSIN) 40 MG tablet TAKE 1 TABLET DAILY Yes GAL Hussein CNP   metFORMIN (GLUCOPHAGE-XR) 500 MG extended release tablet TAKE 1 TABLET EVERY MORNINGAND 2 TABLETS EVERY EVENING Yes Le Castillo MD   blood glucose test strips (ONE TOUCH ULTRA TEST) strip TEST BLOOD SUGAR  Up to  Twice a day as needed. Yes Le Castillo MD   [facility] LANCETS FINE MISC 3 each by Does not apply route 3 times daily Yes Le Castillo MD   Blood Glucose Monitoring Suppl (ONE TOUCH ULTRA 2) w/Device KIT 1 kit by Other route 3 times daily Yes Le Castillo MD   Esomeprazole Magnesium (NEXIUM PO) Take by mouth Yes Historical Provider, MD     Allergies   Allergen Reactions    Sulfa Antibiotics Rash     Believes could have been from an ATX       Review of Systems   Constitutional: Negative for appetite change and unexpected weight change. HENT: Negative for postnasal drip and sinus pain. Respiratory: Negative for cough and shortness of breath. Cardiovascular: Negative for chest pain and palpitations. Gastrointestinal: Negative for nausea and vomiting. Genitourinary: Negative for dysuria and hematuria. Musculoskeletal: Positive for arthralgias. Negative for back pain. Skin: Negative for rash and wound. Neurological: Negative for dizziness and headaches. Psychiatric/Behavioral: Negative for dysphoric mood. The patient is not nervous/anxious.          PHYSICAL EXAM   /72   Pulse 90   Resp 14   Ht 5' 10\" (1.778 m)   Wt 214 lb 12.8 oz (97.4 kg)   SpO2 99%   BMI 30.82 kg/m²   Wt Readings from Last 3 Encounters:   06/23/21 214 lb 12.8 oz (97.4 kg)   05/27/21 213 lb 4.8 oz (96.8 kg)   02/24/21 215 lb (97.5 kg)     BP Readings from Last 3 Encounters:   06/23/21 116/72   05/27/21 120/66   02/24/21 118/74       Physical Exam  Constitutional:       Appearance: Normal appearance. He is well-developed. HENT:      Head: Normocephalic and atraumatic. Right Ear: Tympanic membrane and ear canal normal.      Left Ear: Tympanic membrane and ear canal normal.   Eyes:      General: No scleral icterus. Conjunctiva/sclera: Conjunctivae normal.   Neck:      Thyroid: No thyromegaly. Vascular: No carotid bruit. Cardiovascular:      Rate and Rhythm: Normal rate and regular rhythm. Heart sounds: Normal heart sounds. No murmur heard. Pulmonary:      Effort: Pulmonary effort is normal. No respiratory distress. Breath sounds: Normal breath sounds. No wheezing. Abdominal:      General: There is no distension. Palpations: Abdomen is soft. Tenderness: There is no abdominal tenderness. Musculoskeletal:         General: No deformity. Cervical back: Neck supple. Lymphadenopathy:      Cervical: No cervical adenopathy. Skin:     General: Skin is warm and dry. Findings: No rash. Comments: Mult scars  Neck and right knee   Neurological:      General: No focal deficit present. Mental Status: He is alert. Motor: No abnormal muscle tone. Comments: Motor  5/5 upper and lower ext bilat  Speech clear  Wearing left knee brace   Psychiatric:         Mood and Affect: Mood normal.         Behavior: Behavior normal.         Thought Content:  Thought content normal.         Judgment: Judgment normal.     Dionte Mejía was seen today for pre-op exam.    Diagnoses and all orders for this visit:    Preop examination    Controlled type 2 diabetes mellitus without complication, without long-term current use of insulin ([facility])  -     POCT glycosylated hemoglobin (Hb A1C)    Aortic root dilation ([facility])-7/20 4.3 cm cardiology following     PAF (paroxysmal atrial fibrillation) ([facility] [address]    Essential hypertension    Alcohol use    Pt stable for surgery. He sees cardiology for his afib and aortic root dilation. He is due to have the aorta re checked and I told him to call his cardiologist.  He is on eliquis and was told to hold it 3 days prior to surgery.   bp is great control

## 2021-08-16 RX ORDER — METFORMIN HYDROCHLORIDE 500 MG/1
TABLET, EXTENDED RELEASE ORAL
Qty: 270 TABLET | Refills: 0 | Status: SHIPPED | OUTPATIENT
Start: 2021-08-16 | End: 2021-11-15

## 2021-08-23 ENCOUNTER — TELEPHONE (OUTPATIENT)
Dept: PHARMACY | Facility: CLINIC | Age: 66
End: 2021-08-23

## 2021-08-23 ENCOUNTER — OFFICE VISIT (OUTPATIENT)
Dept: FAMILY MEDICINE CLINIC | Age: 66
End: 2021-08-23
Payer: MEDICARE

## 2021-08-23 VITALS
SYSTOLIC BLOOD PRESSURE: 122 MMHG | RESPIRATION RATE: 10 BRPM | HEIGHT: 70 IN | BODY MASS INDEX: 31.64 KG/M2 | DIASTOLIC BLOOD PRESSURE: 70 MMHG | OXYGEN SATURATION: 98 % | HEART RATE: 74 BPM | WEIGHT: 221 LBS

## 2021-08-23 DIAGNOSIS — E11.9 CONTROLLED TYPE 2 DIABETES MELLITUS WITHOUT COMPLICATION, WITHOUT LONG-TERM CURRENT USE OF INSULIN (HCC): Primary | ICD-10-CM

## 2021-08-23 DIAGNOSIS — I10 ESSENTIAL HYPERTENSION: ICD-10-CM

## 2021-08-23 PROCEDURE — 99214 OFFICE O/P EST MOD 30 MIN: CPT | Performed by: INTERNAL MEDICINE

## 2021-08-23 RX ORDER — DULAGLUTIDE 0.75 MG/.5ML
0.75 INJECTION, SOLUTION SUBCUTANEOUS WEEKLY
Qty: 4 PEN | Refills: 0 | Status: SHIPPED | OUTPATIENT
Start: 2021-08-23 | End: 2021-08-24 | Stop reason: SDUPTHER

## 2021-08-23 RX ORDER — PRAVASTATIN SODIUM 20 MG
20 TABLET ORAL DAILY
Qty: 90 TABLET | Refills: 1 | Status: SHIPPED | OUTPATIENT
Start: 2021-08-23 | End: 2022-01-13 | Stop reason: SDUPTHER

## 2021-08-23 ASSESSMENT — ENCOUNTER SYMPTOMS
VOMITING: 0
NAUSEA: 0

## 2021-08-23 NOTE — TELEPHONE ENCOUNTER
Noted that pravastatin 20 mg daily was started at [address] today - thank you! Will sign off at this time.      Nya Sharpe, PharmD, [facility] // Department, toll free [phone], option 1      For Manny Arndt in place:  No   Recommendation Provided To: Provider: 1 via Note to Provider   Intervention Detail: New Rx: 1, reason: Needs Additional Therapy   Gap Closed?: Yes    Intervention Accepted By: Provider: 1   Time Spent (min): 15

## 2021-08-23 NOTE — PROGRESS NOTES
2021    Chief Complaint   Patient presents with    Hypertension    Diabetes           HPI    DM  hga1c 6.7  Discussed statin  His cholesterol is excellent    In the am glucose  140-150 in the am  Can't tolerate  2000 of metformin  Gets diarrhea  1500 is not constant. htn  bp good today  Never cking bp at home    Reviewed labs from [facility]   Kidney and liver and hga1c and blood count    No family ho MEN or thyroid cancer  No ho pancreatitis  Pt not history of above. Review of Systems   Constitutional: Positive for unexpected weight change (wt gain). Negative for appetite change. Gastrointestinal: Negative for nausea and vomiting. Health Maintenance   Topic Date Due    Hepatitis C screen  Never done    HIV screen  Never done    Low dose CT lung screening  Never done    Annual Wellness Visit (AWV)  Never done    Diabetic foot exam  2021    Flu vaccine (1) 2021    Diabetic microalbuminuria test  2021    Lipid screen  2022    Potassium monitoring  2022    Creatinine monitoring  2022    Diabetic retinal exam  06/10/2022    A1C test (Diabetic or Prediabetic)  2022    Pneumococcal 65+ years Vaccine (1 of 1 - PPSV23) 01/10/2023    Colon cancer screen colonoscopy  2025    DTaP/Tdap/Td vaccine (3 - Td or Tdap) 2030    Shingles Vaccine  Completed    COVID-19 Vaccine  Completed    AAA screen  Completed    Hepatitis A vaccine  Aged Out    Hib vaccine  Aged Out    Meningococcal (ACWY) vaccine  Aged Out      Social History     Tobacco Use    Smoking status: Former Smoker     Packs/day: 0.50     Years: 40.00     Pack years: 20.00     Start date: 1976     Quit date: 2016     Years since quittin.6    Smokeless tobacco: Never Used    Tobacco comment: smokes cigars 1-2 a week   Substance Use Topics    Alcohol use:  Yes     Alcohol/week: 3.0 standard drinks     Types: 3 Cans of beer per week     Comment: much less since dx of diabetes    Drug use: No      History reviewed. No pertinent family history. Prior to Visit Medications    Medication Sig Taking? Authorizing Provider   metFORMIN (GLUCOPHAGE-XR) 500 MG extended release tablet TAKE 1 TABLET EVERY MORNINGAND 2 TABLETS EVERY EVENING Yes Meredith Falcon MD   ELIQUIS 5 MG TABS tablet TAKE 1 TABLET TWICE A DAY Yes GAL Suero CNP   dilTIAZem (CARDIZEM CD) 240 MG extended release capsule TAKE 1 CAPSULE DAILY. Yes GAL Suero CNP   benazepril (LOTENSIN) 40 MG tablet TAKE 1 TABLET DAILY Yes GAL Suero CNP   blood glucose test strips (ONE TOUCH ULTRA TEST) strip TEST BLOOD SUGAR  Up to  Twice a day as needed.  Yes Meredith Falcon MD   [facility] LANCETS FINE MISC 3 each by Does not apply route 3 times daily Yes Meredith Falcon MD   Blood Glucose Monitoring Suppl (ONE TOUCH ULTRA 2) w/Device KIT 1 kit by Other route 3 times daily Yes Meredith Falcon MD   Esomeprazole Magnesium (NEXIUM PO) Take by mouth Yes Historical Provider, MD     Patient Active Problem List   Diagnosis    Kidney stone on right side-(7/11)    ED (erectile dysfunction) of organic origin    Colonic polyps(COLO 2009--REPEAT 1/15-polyp x 1-ghastine    Obesity-advised diet/exercise    Anisocoria-right eye--chronic     H/O low back pain,Mild     Medial meniscus tear,Right    GERD (gastroesophageal reflux disease)--on daily ppi    Essential hypertension    Closed fracture of radius    Carpal tunnel syndrome of left wrist    Controlled type 2 diabetes mellitus without complication, without long-term current use of insulin (HCC)    Dyslipidemia declines medication  8/18    Basal cell carcinoma (BCC) of skin of nose dr [place] Atrial fibrillation with RVR ([facility] [address]    Alcohol use    PAF (paroxysmal atrial fibrillation) (HCC)    Aortic root dilation (HCC)-7/20 4.3 cm cardiology following         LABS:     Lab Results   Component Value Date    LABA1C 6.6 06/23/2021    LABA1C 6.3 02/24/2021    LABA1C 6.3 12/18/2020    LABMICR <1.20 12/18/2020    LABMICR <1.20 07/27/2017       Lab Results   Component Value Date     05/19/2021     12/18/2020     05/13/2020    K 4.5 05/19/2021    K 4.6 12/18/2020    K 4.5 05/13/2020     05/19/2021     12/18/2020     05/13/2020    CO2 23 05/19/2021    CO2 26 12/18/2020    CO2 28 05/13/2020    BUN 23 05/19/2021    BUN 20 12/18/2020    BUN 19 05/13/2020    CREATININE 0.78 05/19/2021    CREATININE 0.9 12/18/2020    CREATININE 0.99 05/13/2020    GLUCOSE 105 (H) 05/19/2021    GLUCOSE 119 (H) 12/18/2020    GLUCOSE 150 (H) 05/13/2020    CALCIUM 8.8 05/19/2021    CALCIUM 9.6 12/18/2020    CALCIUM 9.5 05/13/2020       Lab Results   Component Value Date    CHOL 123 02/17/2021    CHOL 144 05/03/2018    CHOL 98 05/03/2018    CHOL 144 05/03/2018    CHOL 98 05/03/2018    TRIG 94 02/17/2021    TRIG 140 05/03/2018    TRIG 140 05/03/2018    HDL 42 02/17/2021    HDL 43 04/17/2019    HDL 46 05/03/2018    HDL 46 05/03/2018    LDLCALC 63 02/17/2021    LDLCALC 67 04/17/2019    LDLCALC 76 05/03/2018    LDLCALC 76 05/03/2018       Lab Results   Component Value Date    ALT 22 05/19/2021    ALT 36 05/13/2020    ALT 19 04/17/2019    AST 17 05/19/2021    AST 24 05/13/2020    AST 16 04/17/2019       Lab Results   Component Value Date    TSH 0.71 04/16/2019    TSH 1.19 10/28/2013    T4FREE 1.3 04/16/2019    T4FREE 1.1 10/28/2013       Lab Results   Component Value Date    WBC 6.0 02/24/2021    WBC 8.2 04/17/2019    WBC 10.6 04/16/2019    HGB 15.1 02/24/2021    HGB 14.3 04/17/2019    HGB 17.0 04/16/2019    HCT 44.2 02/24/2021    HCT 42.1 04/17/2019    HCT 49.6 04/16/2019    MCV 96.7 02/24/2021    MCV 98.3 04/17/2019    MCV 97.5 04/16/2019     02/24/2021     04/17/2019     04/16/2019       No results found for: INR     Lab Results   Component Value Date    PSA 1.0 08/13/2018    PSA 0.77 10/28/2013        No

## 2021-08-23 NOTE — TELEPHONE ENCOUNTER
Negin Agarwal MD - patient identified as having diabetes and not currently prescribed statin therapy. Current ADA guidelines recommend statin therapy for all patients over 40 regardless of LDL value. Please consider statin therapy at [address] today. Thank you,  Kathy Camacho, PharmD, Tash // Department, toll free [phone], option 1    ==============================================================    [facility]: STATIN THERAPY REVIEW  Identified statin use in persons with diabetes care gap per [employer]. Records dated: 8/15/21. Last Office Visit: 6/23/21    Patient also appears to be taking: benazepril and metformin. Patient not found in Outcomes MTM    ASSESSMENT  ACE/ARB ADHERENCE    Per Insurance Records through 8/7/21 ([employer] More = 100%; Potential Fail Date: 12/14/21): Benazepril last filled on 6/30/21 for 90 day supply. Next refill due: 10/5/21    BP Readings from Last 3 Encounters:   06/23/21 116/72   05/27/21 120/66   02/24/21 118/74     Estimated Creatinine Clearance: 111 mL/min (based on SCr of 0.78 mg/dL). DIABETES ADHERENCE    Per Insurance Records through 8/7/21 (Y[employer] More = 97%; Potential Fail Date: 10/4/21): Metformin last filled on 4/15/21 for 90 day supply. Next refill due: 7/31/21    Per Reconciled Dispense Report:  Metformin last filled on 8/22/21 for 90 day supply. Lab Results   Component Value Date    LABA1C 6.6 06/23/2021    LABA1C 6.3 02/24/2021    LABA1C 6.3 12/18/2020     NOTE A1c <9%    STATIN GAP IDENTIFIED    Lab Results   Component Value Date    CHOL 123 02/17/2021    TRIG 94 02/17/2021    HDL 42 02/17/2021    LDLCALC 63 02/17/2021     ALT   Date Value Ref Range Status   05/19/2021 22 9 - 46 U/L Final     AST   Date Value Ref Range Status   05/19/2021 17 10 - 35 U/L Final       The ASCVD Risk score (Daniel Pop., et al., 2013) failed to calculate for the following reasons:     The valid total cholesterol range is 130 to 320 mg/dL     Hyperlipidemia Goal: Patient is not currently prescribed moderate-intensity statin therapy. 2021 ADA Guidelines Age:   Salena Luna  >/= 36years old:   o No history of ASCVD or 10-year ASCVD risk < 20% - moderate-intensity statin is recommended. PLAN  Recommend initiating at least a moderate-intensity statin therapy at this time. Could consider atorvastatin 10 or 20 mg daily or rosuvastatin 5 or 10 mg daily. Will route to PCP today for consideration.      Karin Armenta, PharmD, Tash // Department, toll free [phone], option 1

## 2021-08-24 RX ORDER — DULAGLUTIDE 0.75 MG/.5ML
0.75 INJECTION, SOLUTION SUBCUTANEOUS WEEKLY
Qty: 2 PEN | Refills: 0 | COMMUNITY
Start: 2021-08-24 | End: 2021-11-08

## 2021-09-03 ENCOUNTER — TELEPHONE (OUTPATIENT)
Dept: FAMILY MEDICINE CLINIC | Age: 66
End: 2021-09-03

## 2021-09-03 NOTE — TELEPHONE ENCOUNTER
----- Message from Chandni Mcbride sent at 9/3/2021  3:59 PM EDT -----  Subject: Message to Provider    QUESTIONS  Information for Provider? PT is in need a pre-op physical and an EKG. He   has surgery 11/1/21. PT has an appt on 10/4/21 for a 1 month follow up on   Trulicity. He would like to know if he able to do the pre op in the same   apt as the follow up.   ---------------------------------------------------------------------------  --------------  [address]  What is the best way for the office to contact you? OK to leave message on   voicemail  Preferred Call Back Phone Number? [phone]  ---------------------------------------------------------------------------  --------------  SCRIPT ANSWERS  Relationship to Patient?  Self

## 2021-09-24 ENCOUNTER — OFFICE VISIT (OUTPATIENT)
Dept: FAMILY MEDICINE CLINIC | Age: 66
End: 2021-09-24
Payer: MEDICARE

## 2021-09-24 VITALS
HEART RATE: 64 BPM | TEMPERATURE: 97.8 F | WEIGHT: 223 LBS | OXYGEN SATURATION: 98 % | BODY MASS INDEX: 31.92 KG/M2 | HEIGHT: 70 IN | SYSTOLIC BLOOD PRESSURE: 126 MMHG | DIASTOLIC BLOOD PRESSURE: 70 MMHG | RESPIRATION RATE: 12 BRPM

## 2021-09-24 DIAGNOSIS — M10.00 ACUTE IDIOPATHIC GOUT, UNSPECIFIED SITE: Primary | ICD-10-CM

## 2021-09-24 DIAGNOSIS — E11.9 CONTROLLED TYPE 2 DIABETES MELLITUS WITHOUT COMPLICATION, WITHOUT LONG-TERM CURRENT USE OF INSULIN (HCC): ICD-10-CM

## 2021-09-24 PROCEDURE — 99214 OFFICE O/P EST MOD 30 MIN: CPT | Performed by: INTERNAL MEDICINE

## 2021-09-24 RX ORDER — TRAMADOL HYDROCHLORIDE 50 MG/1
50 TABLET ORAL EVERY 6 HOURS PRN
Qty: 12 TABLET | Refills: 0 | Status: SHIPPED | OUTPATIENT
Start: 2021-09-24 | End: 2021-09-27

## 2021-09-24 NOTE — PATIENT INSTRUCTIONS
Patient Education        Purine-Restricted Diet: Care Instructions  Your Care Instructions     Purines are substances that are found in some foods. Your body turns purines into uric acid. High levels of uric acid can cause gout, which is a form of arthritis that causes pain and inflammation in joints. You may be able to help control the amount of uric acid in your body by limiting high-purine foods in your diet. Follow-up care is a key part of your treatment and safety. Be sure to make and go to all appointments, and call your doctor if you are having problems. It's also a good idea to know your test results and keep a list of the medicines you take. How can you care for yourself at home? · Plan your meals and snacks around foods that are low in purines and are safe for you to eat. These foods include:  ? Green vegetables and tomatoes. ? Fruits. ? Whole-grain breads, rice, and cereals. ? Eggs, peanut butter, and nuts. ? Low-fat milk, cheese, and other milk products. ? Popcorn. ? Gelatin desserts, chocolate, cocoa, and cakes and sweets, in small amounts. · You can eat certain foods that are medium-high in purines, but eat them only once in a while. These foods include:  ? Legumes, such as dried beans and dried peas. You can have 1 cup cooked legumes each day. ? Asparagus, cauliflower, spinach, mushrooms, and green peas. ? Fish and seafood (other than very high-purine seafood). ? Oatmeal, wheat bran, and wheat germ. · Limit very high-purine foods, including:  ? Organ meats, such as liver, kidneys, sweetbreads, and brains. ? Meats, including merchant, beef, pork, and lamb. ? Game meats and any other meats in large amounts. ? Anchovies, sardines, herring, mackerel, and scallops. ? Gravy. ? Beer. Where can you learn more? Go to https://chpepiceweb.[email or web address] org and sign in to your [employer] account.  Enter F448 in the [facility] box to learn more about \"Purine-Restricted Diet: Care Instructions. \"     If you do not have an account, please click on the \"Sign Up Now\" link. Current as of: December 17, 2020               Content Version: 13.0  © 2006-2021 Healthwise, Incorporated. Care instructions adapted under license by [facility] ([facility]). If you have questions about a medical condition or this instruction, always ask your healthcare professional. [address] any warranty or liability for your use of this information. Patient Education        Purine-Restricted Diet: Care Instructions  Your Care Instructions     Purines are substances that are found in some foods. Your body turns purines into uric acid. High levels of uric acid can cause gout, which is a form of arthritis that causes pain and inflammation in joints. You may be able to help control the amount of uric acid in your body by limiting high-purine foods in your diet. Follow-up care is a key part of your treatment and safety. Be sure to make and go to all appointments, and call your doctor if you are having problems. It's also a good idea to know your test results and keep a list of the medicines you take. How can you care for yourself at home? · Plan your meals and snacks around foods that are low in purines and are safe for you to eat. These foods include:  ? Green vegetables and tomatoes. ? Fruits. ? Whole-grain breads, rice, and cereals. ? Eggs, peanut butter, and nuts. ? Low-fat milk, cheese, and other milk products. ? Popcorn. ? Gelatin desserts, chocolate, cocoa, and cakes and sweets, in small amounts. · You can eat certain foods that are medium-high in purines, but eat them only once in a while. These foods include:  ? Legumes, such as dried beans and dried peas. You can have 1 cup cooked legumes each day. ? Asparagus, cauliflower, spinach, mushrooms, and green peas. ? Fish and seafood (other than very high-purine seafood). ? Oatmeal, wheat bran, and wheat germ.   · Limit very high-purine foods, including:  ? Organ meats, such as liver, kidneys, sweetbreads, and brains. ? Meats, including merchant, beef, pork, and lamb. ? Game meats and any other meats in large amounts. ? Anchovies, sardines, herring, mackerel, and scallops. ? Gravy. ? Beer. Where can you learn more? Go to [email or web address]://[email or web address] [email or web address]. org and sign in to your [employer] account. Enter F448 in the [employer] box to learn more about \"Purine-Restricted Diet: Care Instructions. \"     If you do not have an account, please click on the \"Sign Up Now\" link. Current as of: December 17, 2020               Content Version: 13.0  © 9212-0199 Healthwise, Incorporated. Care instructions adapted under license by [facility] ([facility]). If you have questions about a medical condition or this instruction, always ask your healthcare professional. [place] [address] any warranty or liability for your use of this information.

## 2021-10-04 ENCOUNTER — OFFICE VISIT (OUTPATIENT)
Dept: FAMILY MEDICINE CLINIC | Age: 66
End: 2021-10-04
Payer: MEDICARE

## 2021-10-04 VITALS
SYSTOLIC BLOOD PRESSURE: 118 MMHG | BODY MASS INDEX: 34.07 KG/M2 | HEART RATE: 61 BPM | HEIGHT: 69 IN | WEIGHT: 230 LBS | DIASTOLIC BLOOD PRESSURE: 75 MMHG | OXYGEN SATURATION: 96 %

## 2021-10-04 DIAGNOSIS — M25.50 ARTHRALGIA, UNSPECIFIED JOINT: ICD-10-CM

## 2021-10-04 DIAGNOSIS — E11.9 CONTROLLED TYPE 2 DIABETES MELLITUS WITHOUT COMPLICATION, WITHOUT LONG-TERM CURRENT USE OF INSULIN (HCC): Primary | ICD-10-CM

## 2021-10-04 PROCEDURE — 90694 VACC AIIV4 NO PRSRV 0.5ML IM: CPT | Performed by: INTERNAL MEDICINE

## 2021-10-04 PROCEDURE — G0008 ADMIN INFLUENZA VIRUS VAC: HCPCS | Performed by: INTERNAL MEDICINE

## 2021-10-04 PROCEDURE — 99214 OFFICE O/P EST MOD 30 MIN: CPT | Performed by: INTERNAL MEDICINE

## 2021-10-04 RX ORDER — LANCETS 30 GAUGE
1 EACH MISCELLANEOUS DAILY
Qty: 200 EACH | Refills: 3 | Status: SHIPPED | OUTPATIENT
Start: 2021-10-04 | End: 2021-10-05 | Stop reason: SDUPTHER

## 2021-10-04 NOTE — PROGRESS NOTES
Gibran Kimball (:  1955) is a 72 y.o. male,Established patient, here for evaluation of the following chief complaint(s):  No chief complaint on file. Diagnoses and all orders for this visit:    Controlled type 2 diabetes mellitus without complication, without long-term current use of insulin (HCC)  -     blood glucose test strips (ONE TOUCH ULTRA TEST) strip; TEST BLOOD SUGAR  Up to  Twice a day as needed. -     Lancets MISC; 1 each by Does not apply route daily  -     HM DIABETES FOOT EXAM    Arthralgia, unspecified joint    Other orders  -     Dulaglutide 1.5 MG/0.5ML SOPN; Inject 1.5 mg into the skin once a week  -     INFLUENZA, QUADV, ADJUVANTED, 65 YRS =, IM, PF, PREFILL SYR, 0.5ML (FLUAD)    plans to get booster  Will increase the trulicity to 1.5 mg  I have to wonder about RA with his symptoms. Had very tender toe and uric acid was normal  He co pain in the mcp joints right hand. He wants to keep an eye on this and then we will decide about further work up for RA   He will send me in the glucose readings  I will need to see him back when due for another hga1c  For his dm follow up          Subjective   SUBJECTIVE/OBJECTIVE:  HPI  DM  On trulicity for  6 weeks and does not feel it has helped. No change in appetite   Same diet as usual  No side effects     Insurance covering  It but 200    Toe is fine  Felt better the next day after I saw him    He also noticed pain /stiffness mcp of the right hand. Lab Results   Component Value Date    LABA1C 6.6 2021    LABA1C 6.6 2021    LABA1C 6.3 2021       Review of Systems       Objective   Physical Exam  Constitutional:       Appearance: Normal appearance. He is obese. Pulmonary:      Effort: Pulmonary effort is normal.   Musculoskeletal:      Comments: Swelling  mtp joints on the right     Neurological:      Mental Status: He is alert.       Comments: Foot exam  Microfilament testing normal bilat   Psychiatric: Mood and Affect: Mood normal.         Behavior: Behavior normal.         Thought Content: Thought content normal.         Judgment: Judgment normal.               An electronic signature was used to authenticate this note.     --Nenita Mtz MD

## 2021-10-05 ENCOUNTER — TELEPHONE (OUTPATIENT)
Dept: FAMILY MEDICINE CLINIC | Age: 66
End: 2021-10-05

## 2021-10-05 DIAGNOSIS — E11.9 CONTROLLED TYPE 2 DIABETES MELLITUS WITHOUT COMPLICATION, WITHOUT LONG-TERM CURRENT USE OF INSULIN (HCC): ICD-10-CM

## 2021-10-05 RX ORDER — LANCETS 30 GAUGE
1 EACH MISCELLANEOUS 2 TIMES DAILY
Qty: 200 EACH | Refills: 3 | Status: SHIPPED | OUTPATIENT
Start: 2021-10-05

## 2021-10-05 NOTE — TELEPHONE ENCOUNTER
Eva Goldman calling from [employer] calling needing new prescription for one touch ultra test strip. Original prescription did not say size, or brand name. Need new prescription  - One touch delica plus lancets 30 g   Test strips need to say one touch ultra test strips.    Patient is needing 90 day supply   Fax number [phone]   Please advise

## 2021-10-12 ENCOUNTER — OFFICE VISIT (OUTPATIENT)
Dept: FAMILY MEDICINE CLINIC | Age: 66
End: 2021-10-12
Payer: MEDICARE

## 2021-10-12 VITALS
BODY MASS INDEX: 32.73 KG/M2 | HEART RATE: 63 BPM | SYSTOLIC BLOOD PRESSURE: 128 MMHG | DIASTOLIC BLOOD PRESSURE: 70 MMHG | OXYGEN SATURATION: 96 % | HEIGHT: 69 IN | WEIGHT: 221 LBS | RESPIRATION RATE: 18 BRPM

## 2021-10-12 DIAGNOSIS — I10 ESSENTIAL HYPERTENSION: ICD-10-CM

## 2021-10-12 DIAGNOSIS — C44.91 BASAL CELL CARCINOMA (BCC), UNSPECIFIED SITE: ICD-10-CM

## 2021-10-12 DIAGNOSIS — Z01.818 PREOP EXAMINATION: Primary | ICD-10-CM

## 2021-10-12 DIAGNOSIS — I48.0 PAF (PAROXYSMAL ATRIAL FIBRILLATION) (HCC): ICD-10-CM

## 2021-10-12 DIAGNOSIS — E11.9 CONTROLLED TYPE 2 DIABETES MELLITUS WITHOUT COMPLICATION, WITHOUT LONG-TERM CURRENT USE OF INSULIN (HCC): ICD-10-CM

## 2021-10-12 PROCEDURE — 99214 OFFICE O/P EST MOD 30 MIN: CPT | Performed by: INTERNAL MEDICINE

## 2021-10-12 PROCEDURE — 93000 ELECTROCARDIOGRAM COMPLETE: CPT | Performed by: INTERNAL MEDICINE

## 2021-10-12 ASSESSMENT — ENCOUNTER SYMPTOMS
DIARRHEA: 0
VOMITING: 0
COLOR CHANGE: 0
WHEEZING: 0
EYE PAIN: 0
CONSTIPATION: 0
SHORTNESS OF BREATH: 0
NAUSEA: 0

## 2021-10-12 NOTE — PROGRESS NOTES
PRE-OP HISTORYAND PHYSICAL    10/12/2021    Jhon Rousseau is a 72 y.o. male who presents to the office today for apreoperative consultation. Chief Complaint   Patient presents with    Pre-op Exam     Pt is having eyelid surgery on 11-1-21. Dr Shanelle Rojas is preforming the surgery. No problems with anesthesia  No cardiac or lung problems  No sleep apnea  Has some knee problems but can walk 1/2-1 mile  Has some hip problems    DM  On trulicity  One morning 735 , 172 ,150   Has the rx fro the   1.5 trulicity  Next week will be the first time for the  1.5mg dose     Lab Results   Component Value Date    LABA1C 6.6 09/24/2021    LABA1C 6.6 06/23/2021    LABA1C 6.3 02/24/2021     Has afib   Only had once  Sees cardiology once a yr  On eliquis   No blood in the stool  Once had blood in the urine - had urology work up  A few months ago and tells me he was ok    Has basal cell ca  Sees derm       Past Medical History:   Diagnosis Date    Arthritis     Dyslipidemia     H/O low back pain     Mild     Hypertension     Kidney stone      Past Surgical History:   Procedure Laterality Date    BLEPHAROPLASTY      Bilateral     JOINT REPLACEMENT      surgery no replacement    KNEE ARTHROSCOPY Left 2/5/2014    LEG SURGERY      x 2-Right     NECK SURGERY      herniated disc march 5, 2013    OTHER SURGICAL HISTORY      Ruptured Disc     OTHER SURGICAL HISTORY Left 07/2016    orif--left radial fx    ROTATOR CUFF REPAIR      Left x 2      History reviewed. No pertinent family history.   Social History     Socioeconomic History    Marital status:      Spouse name: None    Number of children: 3    Years of education: None    Highest education level: None   Occupational History    Occupation: Tech @ [employer]      Employer: [employer]    Occupation: Retired October 2014   Tobacco Use    Smoking status: Former Smoker     Packs/day: 0.50     Years: 40.00     Pack years: 20.00     Start date: 1/17/1976 Quit date: 2016     Years since quittin.7    Smokeless tobacco: Never Used    Tobacco comment: smokes cigars 1-2 a week   Substance and Sexual Activity    Alcohol use: Yes     Alcohol/week: 3.0 standard drinks     Types: 3 Cans of beer per week     Comment: much less since dx of diabetes    Drug use: No    Sexual activity: None   Other Topics Concern    None   Social History Narrative    None     Social Determinants of Health     Financial Resource Strain: Low Risk     Difficulty of Paying Living Expenses: Not hard at all   Food Insecurity: No Food Insecurity    Worried About Running Out of Food in the Last Year: Never true    Marium of Food in the Last Year: Never true   Transportation Needs:     Lack of Transportation (Medical):  Lack of Transportation (Non-Medical):    Physical Activity:     Days of Exercise per Week:     Minutes of Exercise per Session:    Stress:     Feeling of Stress :    Social Connections:     Frequency of Communication with Friends and Family:     Frequency of Social Gatherings with Friends and Family:     Attends Moravian Services:     Active Member of Clubs or Organizations:     Attends Club or Organization Meetings:     Marital Status:    Intimate Partner Violence:     Fear of Current or Ex-Partner:     Emotionally Abused:     Physically Abused:     Sexually Abused:      Prior to Visit Medications    Medication Sig Taking? Authorizing Provider   blood glucose test strips (ASCENSIA AUTODISC VI;ONE TOUCH ULTRA TEST VI) strip 1 each by In Vitro route 2 times daily One touch Ultra brand Yes Zenon Cannon MD   Lancets MISC 1 each by Does not apply route 2 times daily (Dispense One touch delica plus lancets 30 gauge) Yes Zenon Cannon MD   Dulaglutide 1.5 MG/0.5ML SOPN Inject 1.5 mg into the skin once a week Yes Zenon Cannon MD   dilTIAZem (CARDIZEM CD) 240 MG extended release capsule TAKE 1 CAPSULE DAILY.  Yes GAL Nova - MATHIEU Dulaglutide (TRULICITY) 2.10 UM/3.3FT SOPN Inject 0.75 mg into the skin once a week [id number] EXP 1/11/2023  GIVEN BY MW   2 BOXES Yes Harley Clay MD   pravastatin (PRAVACHOL) 20 MG tablet Take 1 tablet by mouth daily Yes Harley Clay MD   metFORMIN (GLUCOPHAGE-XR) 500 MG extended release tablet TAKE 1 TABLET EVERY MORNINGAND 2 TABLETS EVERY EVENING Yes Harley Clay MD   ELIQUIS 5 MG TABS tablet TAKE 1 TABLET TWICE A DAY Yes GAL Edward CNP   benazepril (LOTENSIN) 40 MG tablet TAKE 1 TABLET DAILY Yes ChyrGAL Calderon CNP   ONETOUCH DELICA LANCETS FINE MISC 3 each by Does not apply route 3 times daily Yes Harley Clay MD   Blood Glucose Monitoring Suppl (ONE TOUCH ULTRA 2) w/Device KIT 1 kit by Other route 3 times daily Yes Harley Clay MD   Esomeprazole Magnesium (NEXIUM PO) Take by mouth Yes Historical Provider, MD     Allergies   Allergen Reactions    Sulfa Antibiotics Rash     Believes could have been from an ATX       Review of Systems   Constitutional: Negative for fatigue and unexpected weight change. HENT: Negative for hearing loss and tinnitus. Eyes: Negative for pain and visual disturbance. Respiratory: Negative for shortness of breath and wheezing. Cardiovascular: Negative for chest pain, palpitations and leg swelling. Gastrointestinal: Negative for constipation, diarrhea, nausea and vomiting. Endocrine: Negative for cold intolerance and heat intolerance. Genitourinary: Negative for dysuria and frequency. Musculoskeletal: Negative for gait problem and joint swelling. Skin: Negative for color change and rash. Neurological: Negative for dizziness and headaches. Psychiatric/Behavioral: Negative for dysphoric mood. The patient is not nervous/anxious.          PHYSICAL EXAM   /70 (Site: Right Upper Arm, Position: Sitting, Cuff Size: Large Adult)   Pulse 63   Resp 18   Ht 5' 9\" (1.753 m)   Wt 221 lb (100.2 kg)   SpO2 96%   BMI 32.64 kg/m²   Wt Readings from Last 3 Encounters:   10/12/21 221 lb (100.2 kg)   10/04/21 230 lb (104.3 kg)   09/24/21 223 lb (101.2 kg)     BP Readings from Last 3 Encounters:   10/12/21 128/70   10/04/21 118/75   09/24/21 126/70       Physical Exam  Constitutional:       Appearance: Normal appearance. He is well-developed. HENT:      Head: Normocephalic and atraumatic. Right Ear: Tympanic membrane and ear canal normal.      Left Ear: Tympanic membrane and ear canal normal.   Eyes:      General: No scleral icterus. Conjunctiva/sclera: Conjunctivae normal.   Neck:      Thyroid: No thyromegaly. Cardiovascular:      Rate and Rhythm: Normal rate and regular rhythm. Heart sounds: Normal heart sounds. No murmur heard. Pulmonary:      Effort: Pulmonary effort is normal. No respiratory distress. Breath sounds: Normal breath sounds. No wheezing. Abdominal:      General: There is no distension. Palpations: Abdomen is soft. Tenderness: There is no abdominal tenderness. Musculoskeletal:         General: No deformity. Cervical back: Neck supple. Skin:     General: Skin is warm and dry. Findings: No rash. Neurological:      General: No focal deficit present. Mental Status: He is alert. Motor: No abnormal muscle tone. Psychiatric:         Mood and Affect: Mood normal.         Behavior: Behavior normal.         Thought Content: Thought content normal.         Judgment: Judgment normal.       Mary Oshea was seen today for pre-op exam.    Diagnoses and all orders for this visit:    Preop examination  -     EKG 12 Lead  Sinus bradycardia  Rate 59, pr and qtc are normal  avl t wave inversion. No change  From 2019.     Basal cell carcinoma (BCC), unspecified site-multiple basal cell cancer    Controlled type 2 diabetes mellitus without complication, without long-term current use of insulin (HCC)    Essential hypertension    PAF (paroxysmal atrial fibrillation) ([facility] [address] Dorcus Gram Heather JEFF     Patient is low risk for surgery. He has a very nonspecific T wave abnormality in 1-lead on his EKG but that was present in 2019. He has good functional capacity. he sees cardiology once a year for his A. fib.   Diabetes is under good control

## 2021-11-08 RX ORDER — DULAGLUTIDE 1.5 MG/.5ML
INJECTION, SOLUTION SUBCUTANEOUS
Qty: 2 ML | Refills: 0 | Status: SHIPPED | OUTPATIENT
Start: 2021-11-08 | End: 2021-12-02

## 2021-12-02 RX ORDER — DULAGLUTIDE 1.5 MG/.5ML
INJECTION, SOLUTION SUBCUTANEOUS
Qty: 2 ML | Refills: 0 | Status: SHIPPED | OUTPATIENT
Start: 2021-12-02 | End: 2022-01-13 | Stop reason: SDUPTHER

## 2022-01-10 ENCOUNTER — TELEPHONE (OUTPATIENT)
Dept: FAMILY MEDICINE CLINIC | Age: 67
End: 2022-01-10

## 2022-01-10 ENCOUNTER — TELEMEDICINE (OUTPATIENT)
Dept: FAMILY MEDICINE CLINIC | Age: 67
End: 2022-01-10
Payer: MEDICARE

## 2022-01-10 DIAGNOSIS — J06.9 URI WITH COUGH AND CONGESTION: Primary | ICD-10-CM

## 2022-01-10 DIAGNOSIS — J06.9 URI WITH COUGH AND CONGESTION: ICD-10-CM

## 2022-01-10 LAB — SARS-COV-2: NOT DETECTED

## 2022-01-10 PROCEDURE — 99213 OFFICE O/P EST LOW 20 MIN: CPT | Performed by: NURSE PRACTITIONER

## 2022-01-10 RX ORDER — PREDNISONE 20 MG/1
20 TABLET ORAL 2 TIMES DAILY
Qty: 10 TABLET | Refills: 0 | Status: SHIPPED | OUTPATIENT
Start: 2022-01-10 | End: 2022-01-15

## 2022-01-10 ASSESSMENT — ENCOUNTER SYMPTOMS
RHINORRHEA: 1
SHORTNESS OF BREATH: 0
SINUS PAIN: 1
ABDOMINAL PAIN: 0
SINUS PRESSURE: 1
COUGH: 1
VOMITING: 0
SORE THROAT: 0
NAUSEA: 0

## 2022-01-10 NOTE — TELEPHONE ENCOUNTER
----- Message from Kailyn Correa sent at 1/10/2022  9:08 AM EST -----  Subject: Message to Provider    QUESTIONS  Information for Provider? Patient symptoms are sinus pressure, stuffy,   teeth pain, face pain. Patient would like to know if Dr. Karina Benavides can   call call something in for possible sinus infection. Patient would like a   call back as soon as possible today  ---------------------------------------------------------------------------  --------------  CALL BACK INFO  What is the best way for the office to contact you? OK to leave message on   voicemail  Preferred Call Back Phone Number? [phone]  ---------------------------------------------------------------------------  --------------  SCRIPT ANSWERS  Relationship to Patient?  Self

## 2022-01-10 NOTE — PROGRESS NOTES
1/10/2022    TELEHEALTH EVALUATION -- Audio/Visual (During AOUKR-55 public health emergency)    HPI:    Meka Paulson (:  1955) has requested an audio/video evaluation for the following concern(s):  Chief Complaint   Patient presents with    Facial Pain     x4 days, thought it was a head cold them moved to his teeth hurting and his face hurting. if he coughs it hurts his face. when he blows his nose it is not productive. his right ear feels clogged and when he opens his mouth real wide it helps. pt just has a lot of face pain and pressure      Patient reports that symptoms started with sinus congestion and pressure on 22. Has slight runny nose. Drainage is mostly clear. Did have a sore throat and that is now resolved. Has been taking afrin and mucinex without much improvement in symptoms. Denies ill contacts. Has been vaccinate against COVID-19 and influenza. Review of Systems   Constitutional: Positive for activity change and fever. HENT: Positive for congestion, postnasal drip, rhinorrhea, sinus pressure and sinus pain. Negative for ear pain and sore throat. Respiratory: Positive for cough. Negative for shortness of breath. Slight dry cough    Cardiovascular: Negative for chest pain. Gastrointestinal: Negative for abdominal pain, nausea and vomiting. Neurological: Positive for headaches. Negative for dizziness. Prior to Visit Medications    Medication Sig Taking?  Authorizing Provider   benazepril (LOTENSIN) 40 MG tablet TAKE 1 TABLET DAILY Yes Lora Merlin, APRN - CNP   TRULICITY 1.5 FQ/0.1RX SOPN INJECT 1.5MG SUBCUTANEOUSLYONCE WEEKLY Yes Ness Anguiano MD   metFORMIN (GLUCOPHAGE-XR) 500 MG extended release tablet TAKE 1 TABLET EVERY MORNINGAND 2 TABLETS EVERY EVENING Yes Ness Anguiano MD   blood glucose test strips (ASCENSIA AUTODISC VI;ONE TOUCH ULTRA TEST VI) strip 1 each by In Vitro route 2 times daily One touch Ultra brand Yes Anupama Tavarez MD   Lancets MISC 1 each by Does not apply route 2 times daily (Dispense One touch delica plus lancets 30 gauge) Yes Anupama Tavarez MD   dilTIAZem (CARDIZEM CD) 240 MG extended release capsule TAKE 1 CAPSULE DAILY. Yes GAL Connolly CNP   pravastatin (PRAVACHOL) 20 MG tablet Take 1 tablet by mouth daily Yes Anupama Tavarez MD   ELIQUIS 5 MG TABS tablet TAKE 1 TABLET TWICE A DAY Yes GAL Connolly CNP   ONETOUCH DELICA LANCETS FINE [address] [facility] 3 each by Does not apply route 3 times daily Yes Anupama Tavarez MD   Blood Glucose Monitoring Suppl (ONE TOUCH ULTRA 2) w/Device KIT 1 kit by Other route 3 times daily Yes Anupama Tavarez MD   Esomeprazole Magnesium (NEXIUM PO) Take by mouth Yes Historical Provider, MD       Social History     Tobacco Use    Smoking status: Former Smoker     Packs/day: 0.50     Years: 40.00     Pack years: 20.00     Start date: 1976     Quit date: 2016     Years since quittin.9    Smokeless tobacco: Never Used    Tobacco comment: smokes cigars 1-2 a week   Substance Use Topics    Alcohol use: Yes     Alcohol/week: 3.0 standard drinks     Types: 3 Cans of beer per week     Comment: much less since dx of diabetes    Drug use: No        PHYSICAL EXAMINATION:  [ INSTRUCTIONS:  \"[x]\" Indicates a positive item  \"[]\" Indicates a negative item  -- DELETE ALL ITEMS NOT EXAMINED]  Patient-Reported Vitals 1/10/2022   Patient-Reported Weight 218   Patient-Reported Height 5'9\"   Patient-Reported Temperature 97.8        Constitutional: [x] Appears well-developed and well-nourished [x] No apparent distress      [] Abnormal-   Mental status  [x] Alert and awake  [x] Oriented to person/place/time [x]Able to follow commands      Eyes:  EOM    [x]  Normal  [] Abnormal-  Sclera  [x]  Normal  [] Abnormal -         Discharge [x]  None visible  [] Abnormal -    HENT:   [x] Normocephalic, atraumatic.   [] Abnormal   [] Mouth/Throat: Mucous membranes are moist.     External Ears [x] Normal  [] Abnormal-     Neck: [x] No visualized mass     Pulmonary/Chest: [x] Respiratory effort normal.  [x] No visualized signs of difficulty breathing or respiratory distress        [] Abnormal-      Musculoskeletal:   [] Normal gait with no signs of ataxia         [x] Normal range of motion of neck        [] Abnormal-       Neurological:        [x] No Facial Asymmetry (Cranial nerve 7 motor function) (limited exam to video visit)          [x] No gaze palsy        [] Abnormal-         Skin:        [x] No significant exanthematous lesions or discoloration noted on facial skin         [] Abnormal-            Psychiatric:       [x] Normal Affect [x] No Hallucinations        [] Abnormal-     Other pertinent observable physical exam findings-     ASSESSMENT/PLAN:  1. URI with cough and congestion  - COVID-19; Future  - predniSONE (DELTASONE) 20 MG tablet; Take 1 tablet by mouth 2 times daily for 5 days  Dispense: 10 tablet; Refill: 0  Increase fluids  Rest  Flonase 2 sprays each nostril daily   Normal saline spray PRN  Patient is to call if symptoms worsen or fail to improve within the week. Return if symptoms worsen or fail to improve. Ely Mccauley, was evaluated through a synchronous (real-time) audio-video encounter. The patient (or guardian if applicable) is aware that this is a billable service. Verbal consent to proceed has been obtained within the past 12 months. The visit was conducted pursuant to the emergency declaration under the [address] [place] [address], [address] [place] [address] authority and the [employer] General Act. Patient identification was verified, and a caregiver was present when appropriate. The patient was located in a state where the provider was credentialed to provide care.     Total time spent on this encounter: 21 min    --GAL Felipe - CNP on 1/10/2022 at 1:49 PM    An electronic signature was used to authenticate this note.

## 2022-01-25 ENCOUNTER — PATIENT MESSAGE (OUTPATIENT)
Dept: FAMILY MEDICINE CLINIC | Age: 67
End: 2022-01-25

## 2022-01-25 NOTE — TELEPHONE ENCOUNTER
From: Joaquin Needs  To: Dr. Blunt : 1/25/2022 8:11 AM EST  Subject: Blood work and follow up visit    I have typically had bloodwork (A1C etc.) completed in February and August of each year and then an office visit to discuss. Last August you prescribed Trulicity and a followup visit indicated essentially no change in A1C so you increased the dosage. I have not had bloodwork or an office visit since then nor was I even scheduled for either. Thus I have no idea if it's doing anything or not. So should I get back on the February/August cycle? If so, please mail blood work orders.

## 2022-01-27 DIAGNOSIS — E78.5 DYSLIPIDEMIA: ICD-10-CM

## 2022-01-27 DIAGNOSIS — E11.9 CONTROLLED TYPE 2 DIABETES MELLITUS WITHOUT COMPLICATION, WITHOUT LONG-TERM CURRENT USE OF INSULIN (HCC): Primary | ICD-10-CM

## 2022-01-27 NOTE — TELEPHONE ENCOUNTER
I called the pt offered a appointment he stated he will schedule after he gets his blood work done [facility]

## 2022-01-27 NOTE — TELEPHONE ENCOUNTER
he will need to make follow up appt with me. Has not had hga1c since sept. I will place lab orders but he must have follow up appt with me.     Lab Results   Component Value Date    LABA1C 6.6 09/24/2021    LABA1C 6.6 06/23/2021    LABA1C 6.3 02/24/2021

## 2022-02-05 DIAGNOSIS — E11.9 CONTROLLED TYPE 2 DIABETES MELLITUS WITHOUT COMPLICATION, WITHOUT LONG-TERM CURRENT USE OF INSULIN (HCC): ICD-10-CM

## 2022-02-07 RX ORDER — DULAGLUTIDE 1.5 MG/.5ML
INJECTION, SOLUTION SUBCUTANEOUS
Qty: 2 ML | Refills: 0 | Status: SHIPPED | OUTPATIENT
Start: 2022-02-07 | End: 2022-03-03

## 2022-03-03 DIAGNOSIS — E11.9 CONTROLLED TYPE 2 DIABETES MELLITUS WITHOUT COMPLICATION, WITHOUT LONG-TERM CURRENT USE OF INSULIN (HCC): ICD-10-CM

## 2022-03-03 RX ORDER — DULAGLUTIDE 1.5 MG/.5ML
INJECTION, SOLUTION SUBCUTANEOUS
Qty: 2 ML | Refills: 0 | Status: SHIPPED | OUTPATIENT
Start: 2022-03-03 | End: 2022-03-09

## 2022-03-08 LAB
AVERAGE GLUCOSE: NORMAL
HBA1C MFR BLD: 6.8 %

## 2022-03-09 ENCOUNTER — OFFICE VISIT (OUTPATIENT)
Dept: FAMILY MEDICINE CLINIC | Age: 67
End: 2022-03-09
Payer: MEDICARE

## 2022-03-09 VITALS
RESPIRATION RATE: 14 BRPM | DIASTOLIC BLOOD PRESSURE: 84 MMHG | SYSTOLIC BLOOD PRESSURE: 132 MMHG | WEIGHT: 225.2 LBS | OXYGEN SATURATION: 98 % | HEART RATE: 63 BPM | BODY MASS INDEX: 33.36 KG/M2 | HEIGHT: 69 IN | TEMPERATURE: 98.7 F

## 2022-03-09 DIAGNOSIS — I77.810 AORTIC ROOT DILATION (HCC): ICD-10-CM

## 2022-03-09 DIAGNOSIS — I48.0 PAF (PAROXYSMAL ATRIAL FIBRILLATION) (HCC): ICD-10-CM

## 2022-03-09 DIAGNOSIS — E11.9 CONTROLLED TYPE 2 DIABETES MELLITUS WITHOUT COMPLICATION, WITHOUT LONG-TERM CURRENT USE OF INSULIN (HCC): Primary | ICD-10-CM

## 2022-03-09 PROCEDURE — 99214 OFFICE O/P EST MOD 30 MIN: CPT | Performed by: INTERNAL MEDICINE

## 2022-03-09 RX ORDER — DULAGLUTIDE 3 MG/.5ML
3 INJECTION, SOLUTION SUBCUTANEOUS WEEKLY
Qty: 4 PEN | Refills: 0 | Status: SHIPPED | OUTPATIENT
Start: 2022-03-09 | End: 2022-04-18

## 2022-03-09 ASSESSMENT — ENCOUNTER SYMPTOMS
COUGH: 0
SHORTNESS OF BREATH: 1

## 2022-03-09 NOTE — PROGRESS NOTES
Alfredo White (:  1955) is a 77 y.o. male, here for evaluation of the following chief complaint(s):  Discuss Labs (discuss labs)         ASSESSMENT/PLAN:  Nafisa Vela was seen today for discuss labs. Diagnoses and all orders for this visit:    Controlled type 2 diabetes mellitus without complication, without long-term current use of insulin (HCC)    Aortic root dilation (HCC)    PAF (paroxysmal atrial fibrillation) (HCC)    Aortic root dilation (HCC)- 4.3 cm cardiology following     Other orders  -     Dulaglutide (TRULICITY) 3 EK/9.8NQ SOPN; Inject 3 mg into the skin once a week    [facility]  Sees cardiology for afib and aortic root dilation  He notices fast heart beat when carrying laundry up the stairs. Told him to  cardiology  Offered ekg today ( he declined )   Follow up 6 week  SUBJECTIVE/OBJECTIVE:  HPI  Had lab work done with [facility].    Dm  On trulicity 1.5   VJC4X 6.8    Every once in awhile glucose low  Glucose   96 right before dinner  Was not shaky   High in the am    139-154 in the am.     Quit having 2 pieces of toast in the am   Glucose have been creeping up  Had dm few yrs. On eliquis for afib  Doing ok  As far he knows not in afib  Heart can beat fast  Carrying clothes    Dilation  Of aorta  Cardiology is following      Review of Systems   Constitutional: Negative for appetite change and unexpected weight change. Respiratory: Positive for shortness of breath (when carrying laundry). Negative for cough. Objective   Physical Exam  Constitutional:       Appearance: He is well-developed. HENT:      Head: Normocephalic and atraumatic. Cardiovascular:      Rate and Rhythm: Normal rate and regular rhythm. Pulses: Normal pulses. Heart sounds: No murmur heard. Comments: RRR with occasional ectopy  Pulmonary:      Effort: Pulmonary effort is normal.      Breath sounds: Normal breath sounds. No wheezing.    Skin: General: Skin is warm and dry. Neurological:      Mental Status: He is alert. Psychiatric:         Mood and Affect: Mood normal.         Behavior: Behavior normal.         Thought Content:  Thought content normal.         Judgment: Judgment normal.            Noam Espino MD

## 2022-03-10 DIAGNOSIS — E11.9 CONTROLLED TYPE 2 DIABETES MELLITUS WITHOUT COMPLICATION, WITHOUT LONG-TERM CURRENT USE OF INSULIN (HCC): ICD-10-CM

## 2022-04-18 ENCOUNTER — OFFICE VISIT (OUTPATIENT)
Dept: FAMILY MEDICINE CLINIC | Age: 67
End: 2022-04-18
Payer: MEDICARE

## 2022-04-18 VITALS
SYSTOLIC BLOOD PRESSURE: 120 MMHG | DIASTOLIC BLOOD PRESSURE: 80 MMHG | TEMPERATURE: 97.9 F | BODY MASS INDEX: 32.99 KG/M2 | WEIGHT: 223.4 LBS | OXYGEN SATURATION: 96 % | HEART RATE: 67 BPM

## 2022-04-18 DIAGNOSIS — I10 ESSENTIAL HYPERTENSION: ICD-10-CM

## 2022-04-18 DIAGNOSIS — E11.9 CONTROLLED TYPE 2 DIABETES MELLITUS WITHOUT COMPLICATION, WITHOUT LONG-TERM CURRENT USE OF INSULIN (HCC): Primary | ICD-10-CM

## 2022-04-18 PROCEDURE — 99214 OFFICE O/P EST MOD 30 MIN: CPT | Performed by: INTERNAL MEDICINE

## 2022-04-18 ASSESSMENT — PATIENT HEALTH QUESTIONNAIRE - PHQ9
SUM OF ALL RESPONSES TO PHQ9 QUESTIONS 1 & 2: 0
SUM OF ALL RESPONSES TO PHQ QUESTIONS 1-9: 0
1. LITTLE INTEREST OR PLEASURE IN DOING THINGS: 0
SUM OF ALL RESPONSES TO PHQ QUESTIONS 1-9: 0
8. MOVING OR SPEAKING SO SLOWLY THAT OTHER PEOPLE COULD HAVE NOTICED. OR THE OPPOSITE, BEING SO FIGETY OR RESTLESS THAT YOU HAVE BEEN MOVING AROUND A LOT MORE THAN USUAL: 0
SUM OF ALL RESPONSES TO PHQ QUESTIONS 1-9: 0
5. POOR APPETITE OR OVEREATING: 0
6. FEELING BAD ABOUT YOURSELF - OR THAT YOU ARE A FAILURE OR HAVE LET YOURSELF OR YOUR FAMILY DOWN: 0
3. TROUBLE FALLING OR STAYING ASLEEP: 0
2. FEELING DOWN, DEPRESSED OR HOPELESS: 0
4. FEELING TIRED OR HAVING LITTLE ENERGY: 0
7. TROUBLE CONCENTRATING ON THINGS, SUCH AS READING THE NEWSPAPER OR WATCHING TELEVISION: 0
10. IF YOU CHECKED OFF ANY PROBLEMS, HOW DIFFICULT HAVE THESE PROBLEMS MADE IT FOR YOU TO DO YOUR WORK, TAKE CARE OF THINGS AT HOME, OR GET ALONG WITH OTHER PEOPLE: 0
SUM OF ALL RESPONSES TO PHQ QUESTIONS 1-9: 0
9. THOUGHTS THAT YOU WOULD BE BETTER OFF DEAD, OR OF HURTING YOURSELF: 0

## 2022-04-18 ASSESSMENT — ENCOUNTER SYMPTOMS
SHORTNESS OF BREATH: 0
COUGH: 0

## 2022-04-18 NOTE — PROGRESS NOTES
Alexia Flanagan (:  1955) is a 77 y.o. male, here for evaluation of the following chief complaint(s):  Diabetes (6 WEEK F/U)         ASSESSMENT/PLAN:    David Higuera was seen today for diabetes. Diagnoses and all orders for this visit:    Essential hypertension    Controlled type 2 diabetes mellitus without complication, without long-term current use of insulin ([address]    Other orders  -     dapagliflozin (FARXIGA) 5 MG tablet; Take 1 tablet by mouth every morning    NO HO PANCREATITIS  WILL START FARXIGA   THE TRULICITY DID NOT MAKE A BIG DIFFERENCE  WILL STOP  WILL START FARXIGA  FOLLOW UP ONE MONTH  BP WAS OK  CONTINUE LOTENSIN AND CARDIZEM  REVIEWED LABS FROM [facility] TODAY    SUBJECTIVE/OBJECTIVE:  HPI    DIET HAS NOT CHANGED. ON TRULICITY AND IT DOES NOT SEEM TO HELP THAT MUCH  GLUCOSE 192 THIS AM  WORKED IN THE YARD ALL DAY YESTERDAY  139 AFTER WORKING IN THE YARD   HGA1C WAS 6.8 ON MARCH  3     GLUCOSE 192 THIS AM  DOES NOT USUALLY EAT A LOT OF DESSERTS. GENERALLY AM GLUCOSE  130  HGA1C 6.4   DID NOT LOSE WT WITH TRULICITY    Lab Results   Component Value Date    LABA1C 6.6 2021    LABA1C 6.6 2021    LABA1C 6.3 2021       Review of Systems   Constitutional: Negative for appetite change and unexpected weight change. Respiratory: Negative for cough and shortness of breath. Objective   Physical Exam  Constitutional:       Appearance: Normal appearance. HENT:      Head: Normocephalic and atraumatic. Pulmonary:      Effort: Pulmonary effort is normal.      Breath sounds: Normal breath sounds. Neurological:      Mental Status: He is alert. Psychiatric:         Mood and Affect: Mood normal.         Behavior: Behavior normal.         Thought Content:  Thought content normal.         Judgment: Judgment normal.            Cesar Blanc MD

## 2022-05-06 RX ORDER — METFORMIN HYDROCHLORIDE 500 MG/1
TABLET, EXTENDED RELEASE ORAL
Qty: 270 TABLET | Refills: 1 | Status: SHIPPED | OUTPATIENT
Start: 2022-05-06 | End: 2022-11-02

## 2022-05-16 ENCOUNTER — OFFICE VISIT (OUTPATIENT)
Dept: FAMILY MEDICINE CLINIC | Age: 67
End: 2022-05-16
Payer: MEDICARE

## 2022-05-16 ENCOUNTER — TELEPHONE (OUTPATIENT)
Dept: CARDIOLOGY CLINIC | Age: 67
End: 2022-05-16

## 2022-05-16 VITALS
WEIGHT: 223 LBS | RESPIRATION RATE: 12 BRPM | SYSTOLIC BLOOD PRESSURE: 118 MMHG | OXYGEN SATURATION: 98 % | DIASTOLIC BLOOD PRESSURE: 80 MMHG | BODY MASS INDEX: 32.93 KG/M2 | HEART RATE: 80 BPM

## 2022-05-16 DIAGNOSIS — E11.9 CONTROLLED TYPE 2 DIABETES MELLITUS WITHOUT COMPLICATION, WITHOUT LONG-TERM CURRENT USE OF INSULIN (HCC): ICD-10-CM

## 2022-05-16 DIAGNOSIS — I10 ESSENTIAL HYPERTENSION: Primary | ICD-10-CM

## 2022-05-16 DIAGNOSIS — I77.810 AORTIC ROOT DILATION (HCC): ICD-10-CM

## 2022-05-16 PROCEDURE — 99214 OFFICE O/P EST MOD 30 MIN: CPT | Performed by: INTERNAL MEDICINE

## 2022-05-16 PROCEDURE — 3044F HG A1C LEVEL LT 7.0%: CPT | Performed by: INTERNAL MEDICINE

## 2022-05-16 NOTE — TELEPHONE ENCOUNTER
PT called scheduled appt with [facility], pt stated he had labs done by his PCP in March at [address], pt would like to know if the labs are ok or does he need to get up dated labs before his appt?      Pls advise thank you

## 2022-05-16 NOTE — PROGRESS NOTES
Isaac Iqbal (:  1955) is a 77 y.o. male, here for evaluation of the following chief complaint(s):  Diabetes and Flank Pain (after fall in yard 2 weeks ago)         ASSESSMENT/PLAN:  Rosmery Castro was seen today for diabetes and flank pain. Diagnoses and all orders for this visit:    Essential hypertension    Aortic root dilation (HCC)- 4.3 cm cardiology following     Controlled type 2 diabetes mellitus without complication, without long-term current use of insulin ([address]    Other orders  -     dapagliflozin (FARXIGA) 10 MG tablet;  Take 1 tablet by mouth every morning    bp good  Dm good  Will inc farxiga to  10 mg to see if we can get hga1c little better    Follow up in july  SUBJECTIVE/OBJECTIVE:  HPI  Here for follow up  htn    At times when he is doing nothing  Can tell that heart is beating harder      afib  On eliquis  5mg bid    No blood in the stools,  No black tarry stools    DM  130 in the am  Sometimes drops  120 at lunch  Sometimes does not drop  Consistently over  130  Can't tolerate  4 metformin /day    On pravastatin      Lab Results   Component Value Date    LABA1C 6.8 2022    LABA1C 6.6 2021    LABA1C 6.6 2021    LABMICR <1.20 2020    LABMICR <1.20 2017       Lab Results   Component Value Date     2021     2021     2020    K 4.6 2021    K 4.5 2021    K 4.6 2020     2021     2021     2020    CO2 21 2021    CO2 23 2021    CO2 26 2020    BUN 19 2021    BUN 23 2021    BUN 20 2020    CREATININE 0.8 2021    CREATININE 0.78 2021    CREATININE 0.9 2020    GLUCOSE 121 (H) 2021    GLUCOSE 105 (H) 2021    GLUCOSE 119 (H) 2020    CALCIUM 9.7 2021    CALCIUM 8.8 2021    CALCIUM 9.6 2020       Lab Results   Component Value Date    CHOL 123 2021    CHOL 144 2018    CHOL 98 2018 CHOL 144 05/03/2018    CHOL 98 05/03/2018    TRIG 94 02/17/2021    TRIG 140 05/03/2018    TRIG 140 05/03/2018    HDL 42 02/17/2021    HDL 43 04/17/2019    HDL 46 05/03/2018    HDL 46 05/03/2018    LDLCALC 63 02/17/2021    LDLCALC 67 04/17/2019    LDLCALC 76 05/03/2018    LDLCALC 76 05/03/2018       Lab Results   Component Value Date    ALT 28 09/24/2021    ALT 22 05/19/2021    ALT 36 05/13/2020    AST 19 09/24/2021    AST 17 05/19/2021    AST 24 05/13/2020       Lab Results   Component Value Date    TSH 0.71 04/16/2019    TSH 1.19 10/28/2013    T4FREE 1.3 04/16/2019    T4FREE 1.1 10/28/2013       Lab Results   Component Value Date    WBC 6.0 02/24/2021    WBC 8.2 04/17/2019    WBC 10.6 04/16/2019    HGB 15.1 02/24/2021    HGB 14.3 04/17/2019    HGB 17.0 04/16/2019    HCT 44.2 02/24/2021    HCT 42.1 04/17/2019    HCT 49.6 04/16/2019    MCV 96.7 02/24/2021    MCV 98.3 04/17/2019    MCV 97.5 04/16/2019     02/24/2021     04/17/2019     04/16/2019       No results found for: INR     Lab Results   Component Value Date    PSA 1.0 08/13/2018    PSA 0.77 10/28/2013        Lab Results   Component Value Date    LABURIC 5.2 09/24/2021           Review of Systems       Objective   Physical Exam  Constitutional:       Appearance: Normal appearance. He is well-developed. HENT:      Head: Normocephalic and atraumatic. Cardiovascular:      Rate and Rhythm: Normal rate and regular rhythm. Heart sounds: Normal heart sounds. No murmur heard. Pulmonary:      Effort: Pulmonary effort is normal.      Breath sounds: Normal breath sounds. No wheezing. Skin:     General: Skin is warm and dry. Neurological:      Mental Status: He is alert. Psychiatric:         Mood and Affect: Mood normal.         Behavior: Behavior normal.         Thought Content:  Thought content normal.         Judgment: Judgment normal.            Mando Rodríguez MD

## 2022-05-25 ENCOUNTER — HOSPITAL ENCOUNTER (OUTPATIENT)
Age: 67
Discharge: HOME | End: 2022-05-25
Payer: MEDICARE

## 2022-05-25 ENCOUNTER — OFFICE VISIT (OUTPATIENT)
Dept: CARDIOLOGY CLINIC | Age: 67
End: 2022-05-25
Payer: MEDICARE

## 2022-05-25 VITALS
HEIGHT: 69 IN | SYSTOLIC BLOOD PRESSURE: 122 MMHG | BODY MASS INDEX: 31.84 KG/M2 | HEART RATE: 71 BPM | OXYGEN SATURATION: 96 % | WEIGHT: 215 LBS | DIASTOLIC BLOOD PRESSURE: 62 MMHG

## 2022-05-25 DIAGNOSIS — I48.0 PAF (PAROXYSMAL ATRIAL FIBRILLATION) (HCC): Primary | ICD-10-CM

## 2022-05-25 DIAGNOSIS — I77.810 AORTIC ROOT DILATION (HCC): ICD-10-CM

## 2022-05-25 DIAGNOSIS — I48.0 PAF (PAROXYSMAL ATRIAL FIBRILLATION) (HCC): ICD-10-CM

## 2022-05-25 DIAGNOSIS — I10 PRIMARY HYPERTENSION: ICD-10-CM

## 2022-05-25 LAB
ANION GAP SERPL CALCULATED.3IONS-SCNC: 14 MMOL/L (ref 3–16)
BUN BLDV-MCNC: 24 MG/DL (ref 7–20)
CALCIUM SERPL-MCNC: 9.7 MG/DL (ref 8.3–10.6)
CHLORIDE BLD-SCNC: 104 MMOL/L (ref 99–110)
CO2: 24 MMOL/L (ref 21–32)
CREAT SERPL-MCNC: 1 MG/DL (ref 0.8–1.3)
GFR AFRICAN AMERICAN: >60
GFR NON-AFRICAN AMERICAN: >60
GLUCOSE BLD-MCNC: 126 MG/DL (ref 70–99)
MAGNESIUM: 2.3 MG/DL (ref 1.8–2.4)
POTASSIUM SERPL-SCNC: 5.1 MMOL/L (ref 3.5–5.1)
SODIUM BLD-SCNC: 142 MMOL/L (ref 136–145)

## 2022-05-25 PROCEDURE — 80048 BASIC METABOLIC PNL TOTAL CA: CPT

## 2022-05-25 PROCEDURE — 1123F ACP DISCUSS/DSCN MKR DOCD: CPT | Performed by: NURSE PRACTITIONER

## 2022-05-25 PROCEDURE — 36415 COLL VENOUS BLD VENIPUNCTURE: CPT

## 2022-05-25 PROCEDURE — 83735 ASSAY OF MAGNESIUM: CPT

## 2022-05-25 PROCEDURE — 99214 OFFICE O/P EST MOD 30 MIN: CPT | Performed by: NURSE PRACTITIONER

## 2022-05-25 NOTE — PROGRESS NOTES
[facility]     Outpatient Follow Up Note    Flakita Richmond is 77 y.o. male who presents today with a history of dilated AoR, PAF and HTN. CHIEF COMPLAINT / HPI:  Follow Up secondary to paroxysmal atrial fibrillation  In the past month, his heart feels like its been beating harder. Its noticeable when sitting quietly in the evening. It last a few minutes. He's had no associated SOB/nausea/sweating. It doesn't feel like his AF    Subjective:   He started questioning whether his diabetic meds affected his HR. He started symptoms about the time he started on farxiga. When he first was diagnosed with AF he googled what would cause it. He found that it could have been from steroids of which he had an injection earlier that particular day. he denies significant chest pain. There is no SOB/YANEZ. The patient denies orthopnea/PND. The patient does not have swelling. The patients weight is stable . The patient is not experiencing dizziness. These symptoms show no change since the last OV. With regard to medication therapy the patient has been compliant with prescribed regimen. They have tolerated therapy to date.      Past Medical History:   Diagnosis Date    Arthritis     Dyslipidemia     H/O low back pain     Mild     Hypertension     Kidney stone      Social History:    Social History     Tobacco Use   Smoking Status Former Smoker    Packs/day: 0.50    Years: 40.00    Pack years: 20.00    Start date: 1976   Margaret Rodriguez Quit date: 2016    Years since quittin.3   Smokeless Tobacco Never Used   Tobacco Comment    smokes cigars 1-2 a week     Current Medications:  Current Outpatient Medications   Medication Sig Dispense Refill    dapagliflozin (FARXIGA) 10 MG tablet Take 1 tablet by mouth every morning 30 tablet 1    metFORMIN (GLUCOPHAGE-XR) 500 MG extended release tablet TAKE 1 TABLET EVERY MORNINGAND 2 TABLETS EVERY EVENING 270 tablet 1    pravastatin (PRAVACHOL) 20 MG tablet Take 1 tablet by mouth daily 90 tablet 1    benazepril (LOTENSIN) 40 MG tablet TAKE 1 TABLET DAILY 90 tablet 1    blood glucose test strips (ASCENSIA AUTODISC VI;ONE TOUCH ULTRA TEST VI) strip 1 each by In Vitro route 2 times daily One touch Ultra brand 200 strip 3    Lancets MISC 1 each by Does not apply route 2 times daily (Dispense One touch delica plus lancets 30 gauge) 200 each 3    dilTIAZem (CARDIZEM CD) 240 MG extended release capsule TAKE 1 CAPSULE DAILY. 90 capsule 2    ELIQUIS 5 MG TABS tablet TAKE 1 TABLET TWICE A  tablet 2    ONETOUCH DELICA LANCETS FINE MISC 3 each by Does not apply route 3 times daily 300 each 3    Blood Glucose Monitoring Suppl (ONE TOUCH ULTRA 2) w/Device KIT 1 kit by Other route 3 times daily 1 kit 3    Esomeprazole Magnesium (NEXIUM PO) Take by mouth       No current facility-administered medications for this visit. REVIEW OF SYSTEMS:    CONSTITUTIONAL: No major weight gain or loss, fatigue, weakness, night sweats or fever. HEENT: No new vision difficulties or ringing in the ears. RESPIRATORY: No new SOB, PND, orthopnea or cough. CARDIOVASCULAR: See HPI  GI: No nausea, vomiting, diarrhea, constipation, abdominal pain or changes in bowel habits. : No urinary frequency, urgency, incontinence; hematuria   SKIN: No cyanosis or skin lesions. MUSCULOSKELETAL: No new muscle or joint pain. NEUROLOGICAL: No syncope or TIA-like symptoms.   PSYCHIATRIC: No anxiety, pain, insomnia or depression    Objective:   PHYSICAL EXAM:    Vitals:    05/25/22 0955 05/25/22 1014   BP: 110/60 122/62   Site: Right Upper Arm Right Upper Arm   Position: Sitting    Cuff Size: Large Adult Large Adult   Pulse: 71    SpO2: 96%    Weight: 215 lb (97.5 kg)    Height: 5' 9\" (1.753 m)          VITALS:  /60 (Site: Right Upper Arm, Position: Sitting, Cuff Size: Large Adult)   Pulse 71   Ht 5' 9\" (1.753 m)   Wt 215 lb (97.5 kg)   SpO2 96%   BMI 31.75 kg/m²   CONSTITUTIONAL: Cooperative, no apparent distress, and appears well nourished / developed  NEUROLOGIC:  Awake and orientated to person, place and time. PSYCH: Calm affect. SKIN: Warm and dry. HEENT: Sclera non-icteric, normocephalic, neck supple, no elevation of JVP, normal carotid pulses with no bruits and thyroid normal size. LUNGS:  No increased work of breathing and clear to auscultation, no crackles or wheezing  CARDIOVASCULAR:  Regular rate 68 and rhythm with no murmurs, gallops, rubs, or abnormal heart sounds, normal PMI. The apical impulses not displaced  JVP less than 8 cm H2O  Heart tones are crisp and normal  Cervical veins are not engorged  The carotid upstroke is normal in amplitude and contour without delay or bruit  JVP is not elevated  ABDOMEN:  Normal bowel sounds, non-distended and non-tender to palpation  EXT: No edema, no calf tenderness. Pulses are present bilaterally.     DATA:    Lab Results   Component Value Date    ALT 28 09/24/2021    AST 19 09/24/2021    ALKPHOS 86 09/24/2021    BILITOT 0.3 09/24/2021     Lab Results   Component Value Date    CREATININE 0.8 09/24/2021    BUN 19 09/24/2021     09/24/2021    K 4.6 09/24/2021     09/24/2021    CO2 21 09/24/2021     No components found for: CHLPL  Lab Results   Component Value Date    TRIG 94 02/17/2021    TRIG 140 05/03/2018    TRIG 140 05/03/2018     Lab Results   Component Value Date    HDL 42 02/17/2021    HDL 43 04/17/2019    HDL 46 05/03/2018    HDL 46 05/03/2018     Lab Results   Component Value Date    LDLCALC 63 02/17/2021    LDLCALC 67 04/17/2019    LDLCALC 76 05/03/2018    LDLCALC 76 05/03/2018     Lab Results   Component Value Date    LABVLDL 31 04/17/2019    LABVLDL 27 08/19/2010     Radiology Review:  Pertinent images / reports were reviewed as a part of this visit and reveals the following:    [id number] Score for Atrial Fibrillation Stroke Risk   Risk   Factors  Component Value   C CHF No 0   H HTN Yes 1   A2 Age >= 75 No,  (66 y.o.) 0   D DM Yes 1   S2 Prior Stroke/TIA No 0   V Vascular Disease No 0   A Age 74-69 Yes,  (68 y.o.) 1   Sc Sex male 0    [id number]  Score  3   Score last updated 5/25/22 10:26 AM EDT     Echo: April '19:  Summary   Mod. Conc. LVH; Estimated ejection fraction is 60%. The left atrium is moderately dilated. Mild aortic regurgitation is present. The ascending aorta is mildly dilated at  4.2 cm. Trivial Pulmonic and Tricuspid Regurgitation. Event Monitor: 5/14 -6/12/19: AF RVR      avg HR 69 min 31 max 146      AF burden < 1%      Echo: July '20:  Summary   Normal left ventricle size, wall thickness and systolic function with an   estimated ejection fraction of 55-60%. No regional wall motion abnormalities are seen. Normal diastolic function. The ascending aorta is mildly dilated at 4.3 cm. Mild aortic regurgitation. Trivial tricuspid regurgitation. PASP of 25 mmHg. Assessment:      Diagnosis Orders   1. PAF (paroxysmal atrial fibrillation) (HCC)   ~stable : denies recurrence of AF  ~c/o atypical heart pounding at rest of brief duration but curious   ~AP regular  ~diltiazem with DOAC  ~LA mod dilated on echo   ~CHADs 2  ~AF burden < 1% on Event monitor '19        2. primary hypertension   ~controlled     3. Aortic root dilation (HCC)   ~mildly dilated at 4.3 cm on echo from July '20 compared to 4.2 cm in '19  ~controlled BP       I had the opportunity to review the clinical symptoms and presentation of Alcides Servin. Plan:     1. BMP/Mg+ today : reassess electrolytes   2. Echo : surveillance AoR   3. F/U in 6 months    Overall the patient is stable from CV standpoint    I have addresed the patient's cardiac risk factors and adjusted pharmacologic treatment as needed. In addition, I have reinforced the need for patient directed risk factor modification. Further evaluation will be based upon the patient's clinical course and testing results.     All questions and concerns were addressed to the patient. Alternatives to my treatment were discussed. The patient is not currently smoking. The risks related to smoking were reviewed with the patient. Recommend maintaining a smoke-free lifestyle    Patient is not on a beta-blocker : neg MI  Patient is on an ace-i  Patient is not on a statin : neg CAD / hyperlipidemia     anti-coagulation has been recommended / prescribed for this patient. Education conducted on adverse reactions including bleeding was discussed. The patient verbalizes understanding not to stop medications without discussing with us. Discussed exercise: 30-60 minutes 7 days/week : he's been out tree trimming the past couple of days without having symptoms  Discussed diet. SMBG 130     Thank you for allowing to us to participate in the care of Edmar Camargo.     GAL Awad    Documentation of today's visit sent to PCP

## 2022-05-25 NOTE — PATIENT INSTRUCTIONS
Labs : recheck your electrolytes     Echocardiogram to reassess your aortic root     appt in 6 months

## 2022-05-29 ENCOUNTER — HOSPITAL ENCOUNTER (EMERGENCY)
Age: 67
Discharge: HOME | End: 2022-05-29
Payer: MEDICARE

## 2022-05-29 ENCOUNTER — APPOINTMENT (OUTPATIENT)
Dept: GENERAL RADIOLOGY | Age: 67
End: 2022-05-29
Payer: MEDICARE

## 2022-05-29 VITALS
OXYGEN SATURATION: 96 % | DIASTOLIC BLOOD PRESSURE: 62 MMHG | SYSTOLIC BLOOD PRESSURE: 140 MMHG | TEMPERATURE: 98.5 F | RESPIRATION RATE: 16 BRPM | HEART RATE: 58 BPM

## 2022-05-29 DIAGNOSIS — S63.501A SPRAIN OF RIGHT WRIST, INITIAL ENCOUNTER: Primary | ICD-10-CM

## 2022-05-29 PROCEDURE — 73110 X-RAY EXAM OF WRIST: CPT

## 2022-05-29 PROCEDURE — 99283 EMERGENCY DEPT VISIT LOW MDM: CPT

## 2022-05-29 ASSESSMENT — ENCOUNTER SYMPTOMS
NAUSEA: 0
BACK PAIN: 0

## 2022-05-29 ASSESSMENT — PAIN - FUNCTIONAL ASSESSMENT: PAIN_FUNCTIONAL_ASSESSMENT: 0-10

## 2022-05-29 ASSESSMENT — PAIN SCALES - GENERAL: PAINLEVEL_OUTOF10: 4

## 2022-05-29 NOTE — ED NOTES
.Pt discharged at this time. Discharge instructions and medications reviewed,  Questions were answered. PT verbalized understanding. Follow up appointments were discussed.          [address]  05/29/22 2139

## 2022-05-29 NOTE — ED PROVIDER NOTES
[address] [facility]      Pt Name: Jordy Crystal  MRN: [id number]  [place] 1955  Date of evaluation: 5/29/2022  Provider: Radha Boone PA-C    This patient was not seen and evaluated by the attending physician No att. providers found. CHIEF COMPLAINT      Chief Complaint: wrist pain      HISTORYOF PRESENT ILLNESS  (Location/Symptom, Timing/Onset, Context/Setting, Quality, Duration, Modifying Factors, Severity.)   Jordy Crysatl is a 77 y.o. male who presents to the emergency department with right wrist pain. The patient was sitting on a 5 gallon bucket yesterday, reached for a tool on the ground beside him and the bucket tipped over. He braced himself with the right hand. Since then he has had pain and swelling in the right wrist.  Pain is constant and moderate. It worsens with movement. He has been wearing a brace with some improvement. Nursing Notes were reviewed and I agree. REVIEW OF SYSTEMS    (2-9 systems for level 4, 10 or more forlevel 5)     Review of Systems   Constitutional: Negative for chills and fever. Gastrointestinal: Negative for nausea. Genitourinary: Negative for difficulty urinating. Musculoskeletal: Positive for arthralgias and joint swelling. Negative for back pain and neck pain. Skin: Negative for rash and wound. Neurological: Negative for weakness and numbness. All other systems reviewed and are negative. Positives and Pertinent negatives as per HPI. Except as noted above the remainder of the review of systems was reviewed and negative.        PAST MEDICALHISTORY         Diagnosis Date    Arthritis     Dyslipidemia     H/O low back pain     Mild     Hypertension     Kidney stone        SURGICAL HISTORY           Procedure Laterality Date    BLEPHAROPLASTY      Bilateral     JOINT REPLACEMENT      surgery no replacement    KNEE ARTHROSCOPY Left 2/5/2014    LEG SURGERY      x 2-Right  NECK SURGERY      herniated disc 2013    OTHER SURGICAL HISTORY      Ruptured Disc     OTHER SURGICAL HISTORY Left 2016    orif--left radial fx    ROTATOR CUFF REPAIR      Left x 2        CURRENT MEDICATIONS       Previous Medications    BENAZEPRIL (LOTENSIN) 40 MG TABLET    TAKE 1 TABLET DAILY    BLOOD GLUCOSE MONITORING SUPPL (ONE TOUCH ULTRA 2) W/DEVICE KIT    1 kit by Other route 3 times daily    BLOOD GLUCOSE TEST STRIPS (ASCENSIA AUTODISC VI;ONE TOUCH ULTRA TEST VI) STRIP    1 each by In Vitro route 2 times daily One touch Ultra brand    DAPAGLIFLOZIN (FARXIGA) 10 MG TABLET    Take 1 tablet by mouth every morning    DILTIAZEM (CARDIZEM CD) 240 MG EXTENDED RELEASE CAPSULE    TAKE 1 CAPSULE DAILY. ELIQUIS 5 MG TABS TABLET    TAKE 1 TABLET TWICE A DAY    ESOMEPRAZOLE MAGNESIUM (NEXIUM PO)    Take by mouth    LANCETS MISC    1 each by Does not apply route 2 times daily (Dispense One touch delica plus lancets 30 gauge)    METFORMIN (GLUCOPHAGE-XR) 500 MG EXTENDED RELEASE TABLET    TAKE 1 TABLET EVERY MORNINGAND 2 TABLETS EVERY EVENING    ONETOUCH DELICA LANCETS FINE MISC    3 each by Does not apply route 3 times daily    PRAVASTATIN (PRAVACHOL) 20 MG TABLET    Take 1 tablet by mouth daily       ALLERGIES     Sulfa antibiotics    FAMILY HISTORY     History reviewed. No pertinent family history. Family Status   Relation Name Status    Mother  Alive    Father          congestive heart failure         SOCIAL HISTORY    reports that he quit smoking about 6 years ago. He started smoking about 46 years ago. He has a 20.00 pack-year smoking history. He has never used smokeless tobacco. He reports current alcohol use of about 3.0 standard drinks of alcohol per week. He reports that he does not use drugs.     PHYSICAL EXAM    (up to 7 for level 4, 8 or more for level 5)     ED Triage Vitals   BP Temp Temp src Pulse Resp SpO2 Height Weight   -- -- -- -- -- -- -- --       Physical Exam  Vitals and nursing note reviewed. Constitutional:       General: He is not in acute distress. Appearance: He is well-developed. HENT:      Head: Normocephalic and atraumatic. Cardiovascular:      Pulses: Normal pulses. Pulmonary:      Effort: Pulmonary effort is normal. No respiratory distress. Musculoskeletal:      Cervical back: Neck supple. Comments: Soft tissue swelling dorsum of left hand and wrist with tenderness distal radius, mildly reduced dorsiflexion at the wrist due to pain. No tenderness at snuffbox. Skin:     General: Skin is warm and dry. Neurological:      Mental Status: He is alert and oriented to person, place, and time. Psychiatric:         Behavior: Behavior normal.            DIAGNOSTIC RESULTS     When ordered, EKGs are interpreted by the Emergency Department Physician in the absence of a cardiologist. Please see their note for interpretation of EKG    RADIOLOGY:         Interpretation per the Radiologist below, if available at the time of this note:    XR WRIST RIGHT (MIN 3 VIEWS)   Final Result   Degenerative change without acute osseous abnormality. LABS:  Labs Reviewed - No data to display    When ordered, only abnormal lab results are displayed. All other labs are within normal range or not returned as of the dictation. EMERGENCY DEPARTMENT COURSE and DIFFERENTIAL DIAGNOSIS/MDM:   Vitals:    Vitals:    05/29/22 1735   BP: (!) 142/85   Pulse: 93   Resp: 18   Temp: 98 °F (36.7 °C)   SpO2: 99%        I have evaluated this patient. My supervising physician was available for consultation. The patient is NV intact. No acute deformity. X-ray shows no acute fracture, degenerative changes only. Patient placed in velcro olar wrist splint and remains NV intact after placement. Advised elevation, Tylenol, close follow up with orthopedics especially if pain persists he may need repeat x-ray in 1 week to rule out occult fracture. Referral placed. Discussed results, diagnosis and plan with patient and/or family. Questions addressed. Dispositionand follow-up agreed upon. Specific discharge instructions explained. The patient and/or family and I have discussed the diagnosis and risks, and we agree with discharging home to follow-up with their primary care,specialist or referral doctor. We also discussed returning to the Emergency Department immediately if new or worsening symptoms occur. We have discussed the symptoms which are most concerning that necessitate immediatereturn. PROCEDURES:  None    FINAL IMPRESSION      1. Sprain of right wrist, initial encounter          DISPOSITION/PLAN   DISPOSITION        PATIENT REFERRED TO:  Nohemi Torres MD  [address] [place] [address]    Schedule an appointment as soon as possible for a visit         MEDICATIONS:  New Prescriptions    No medications on file       (Please note that portions of this note were completed with a voice recognition program.  Efforts were made toedit the dictations but occasionally words are mis-transcribed.)    FRANCHESCA Pastor PA-C  05/29/22 1800

## 2022-06-02 ENCOUNTER — OFFICE VISIT (OUTPATIENT)
Dept: ORTHOPEDIC SURGERY | Age: 67
End: 2022-06-02
Payer: MEDICARE

## 2022-06-02 VITALS — WEIGHT: 215.4 LBS | HEIGHT: 69 IN | BODY MASS INDEX: 31.9 KG/M2

## 2022-06-02 DIAGNOSIS — S63.501A WRIST SPRAIN, RIGHT, INITIAL ENCOUNTER: Primary | ICD-10-CM

## 2022-06-02 PROCEDURE — 99204 OFFICE O/P NEW MOD 45 MIN: CPT | Performed by: STUDENT IN AN ORGANIZED HEALTH CARE EDUCATION/TRAINING PROGRAM

## 2022-06-02 PROCEDURE — 1123F ACP DISCUSS/DSCN MKR DOCD: CPT | Performed by: STUDENT IN AN ORGANIZED HEALTH CARE EDUCATION/TRAINING PROGRAM

## 2022-06-02 RX ORDER — METHYLPREDNISOLONE 4 MG/1
TABLET ORAL
Qty: 1 KIT | Refills: 0 | Status: SHIPPED | OUTPATIENT
Start: 2022-06-02 | End: 2022-06-08

## 2022-06-02 NOTE — PROGRESS NOTES
CHIEF COMPLAINT: Right wrist pain    DATE OF INJURY: 5/28/22    History:   Mr. Mojgan Garcia 77 y.o. right handed male presents today for the first visit for evaluation of a right wrist pain / injury. The patient was referred by [facility] ED. This is evaluated as a personal injury. The pain began 4 days ago. He presented to the ED the following day due to worsening pain over night and he was placed in a brace. He rates pain at 1/10. There was a history of injury. He was sitting on a bucket that tipped over and he fell on an outstretched hand. Pain is worse with flexion, extension, ulnar and radial deviation. It feels better in the brace. The patient has not taken NSAIDs as he is on Elliquis. Outside reports reviewed: ER records.     Past Medical History:   Diagnosis Date    Arthritis     Dyslipidemia     H/O low back pain     Mild     Hypertension     Kidney stone        Past Surgical History:   Procedure Laterality Date    BLEPHAROPLASTY      Bilateral     JOINT REPLACEMENT      surgery no replacement    KNEE ARTHROSCOPY Left 2/5/2014    LEG SURGERY      x 2-Right     NECK SURGERY      herniated disc march 5, 2013    OTHER SURGICAL HISTORY      Ruptured Disc     OTHER SURGICAL HISTORY Left 07/2016    orif--left radial fx    ROTATOR CUFF REPAIR      Left x 2        Current Outpatient Medications on File Prior to Visit   Medication Sig Dispense Refill    dapagliflozin (FARXIGA) 10 MG tablet Take 1 tablet by mouth every morning 30 tablet 1    metFORMIN (GLUCOPHAGE-XR) 500 MG extended release tablet TAKE 1 TABLET EVERY MORNINGAND 2 TABLETS EVERY EVENING (Patient taking differently: TAKE 1 TABLET EVERY MORNING AND 2 TABLETS EVERY EVENING) 270 tablet 1    pravastatin (PRAVACHOL) 20 MG tablet Take 1 tablet by mouth daily 90 tablet 1    benazepril (LOTENSIN) 40 MG tablet TAKE 1 TABLET DAILY 90 tablet 1    blood glucose test strips (ASCENSIA AUTODISC VI;ONE TOUCH ULTRA TEST VI) strip 1 each by In Vitro route 2 times daily One touch Ultra brand 200 strip 3    Lancets MISC 1 each by Does not apply route 2 times daily (Dispense One touch delica plus lancets 30 gauge) 200 each 3    dilTIAZem (CARDIZEM CD) 240 MG extended release capsule TAKE 1 CAPSULE DAILY. 90 capsule 2    ELIQUIS 5 MG TABS tablet TAKE 1 TABLET TWICE A  tablet 2    ONETOUCH DELICA LANCETS FINE MISC 3 each by Does not apply route 3 times daily 300 each 3    Blood Glucose Monitoring Suppl (ONE TOUCH ULTRA 2) w/Device KIT 1 kit by Other route 3 times daily 1 kit 3    Esomeprazole Magnesium (NEXIUM PO) Take by mouth       No current facility-administered medications on file prior to visit. Allergies   Allergen Reactions    Sulfa Antibiotics Rash     Believes could have been from an ATX       Social History     Socioeconomic History    Marital status:      Spouse name: Not on file    Number of children: 3    Years of education: Not on file    Highest education level: Not on file   Occupational History    Occupation: Tech @ [employer]      Employer: [address] [place] [address] Occupation: Retired 2014   Tobacco Use    Smoking status: Former Smoker     Packs/day: 0.50     Years: 40.00     Pack years: 20.00     Start date: 1976     Quit date: 2016     Years since quittin.3    Smokeless tobacco: Never Used    Tobacco comment: smokes cigars 1-2 a week   Vaping Use    Vaping Use: Never used   Substance and Sexual Activity    Alcohol use:  Yes     Alcohol/week: 3.0 standard drinks     Types: 3 Cans of beer per week     Comment: much less since dx of diabetes    Drug use: No    Sexual activity: Not on file   Other Topics Concern    Not on file   Social History Narrative    Not on file     Social Determinants of Health     Financial Resource Strain: Low Risk     Difficulty of Paying Living Expenses: Not hard at all   Food Insecurity: No Food Insecurity    Worried About [address] "2,10E+07" in the Last Year: Never true    Ran Out of Food in the Last Year: Never true   Transportation Needs:     Lack of Transportation (Medical): Not on file    Lack of Transportation (Non-Medical): Not on file   Physical Activity:     Days of Exercise per Week: Not on file    Minutes of Exercise per Session: Not on file   Stress:     Feeling of Stress : Not on file   Social Connections:     Frequency of Communication with Friends and Family: Not on file    Frequency of Social Gatherings with Friends and Family: Not on file    Attends Yarsanism Services: Not on file    Active Member of [address] [place] [address] [employer] or Organizations: Not on file    Attends Club or Organization Meetings: Not on file    Marital Status: Not on file   Intimate Partner Violence:     Fear of Current or Ex-Partner: Not on file    Emotionally Abused: Not on file    Physically Abused: Not on file    Sexually Abused: Not on file   Housing Stability:     Unable to Pay for Housing in the Last Year: Not on file    Number of [place] in the Last Year: Not on file    Unstable Housing in the Last Year: Not on file       No family history on file. Review of Systems:  I have reviewed the clinically relevant past medical history, medications, allergies, family history, social history, and 13 point Review of Systems from the patient's recent history form & documented any details relevant to today's presenting complaints in the history above. The patient's self-reported past medical history, medications, allergies, family history, social history, and Review of Systems form from 6/2/22 have been scanned into the chart under the \"Media\" tab. Physical Examination:     Mr. Juan Brooke is a pleasant 77 y.o. male who presents today in no acute distress, awake, alert, and oriented. There is mild swelling that can be seen over the dorsal aspect of the wrist, no change in the color.  He has intact sensation to light touch throughout the bilateral hands in the median, radial, and ulnar nerve distribution and good radial pulses. EPL/FPL/Interossei are intact bilaterally. He is tender globally across the dorsal aspect of the wrist and over the thenar eminence. There is pain with thumb opposition, resisted wrist flexion and extension. IMAGING:  Right Wrist Xray's:  3 views reviewed from the [facility] ED demonstrating No acute fracture no dislocation. Naye Webber is narrowing of multiple MCP and interphalangeal joints. Naye Webber is also narrowing of the radiocarpal row. Assessment:     Right wrist sprain      Plan:     Natural history and expected course discussed. Questions answered. Discussed that there is no evidence of fracture however there is evidence of arthritis in the wrist. We discussed that his pain and swelling is likely due to a wrist sprain. Medrol dose pack E-scribed. Continue brace. HEP provided. If no relief over 1-2 weeks of trying home exercises, he will call and we will place a formal physical therapy order. Follow up in 4 weeks if no improvement. Dominique Renae PA-C  Board Certified by the [employer] on Certification of [address] and [address]: This note was generated with use of a verbal recognition program and an attempt was made to check for errors. It is possible that there are still dictated errors within this office note. If so, please bring any significant errors to my attention for an addendum. All efforts were made to ensure that this office note is accurate.

## 2022-06-07 RX ORDER — APIXABAN 5 MG/1
TABLET, FILM COATED ORAL
Qty: 180 TABLET | Refills: 0 | Status: SHIPPED | OUTPATIENT
Start: 2022-06-07 | End: 2022-09-12

## 2022-06-13 NOTE — TELEPHONE ENCOUNTER
dilTIAZem (CARDIZEM CD) 240 MG extended release capsule [[id number]]    Last OV: 5/25/22  Last BMP: 5/25/22  Appt scheduled : 11/28/22                            benazepril (LOTENSIN) 40 MG tablet [[id number]]     Last OV: 5/25/22  Last labs: 10/12/21  Appt scheduled : 11/28/22

## 2022-06-15 RX ORDER — BENAZEPRIL HYDROCHLORIDE 40 MG/1
TABLET, FILM COATED ORAL
Qty: 90 TABLET | Refills: 1 | Status: SHIPPED | OUTPATIENT
Start: 2022-06-15

## 2022-06-15 RX ORDER — DILTIAZEM HYDROCHLORIDE 240 MG/1
CAPSULE, COATED, EXTENDED RELEASE ORAL
Qty: 90 CAPSULE | Refills: 2 | Status: SHIPPED | OUTPATIENT
Start: 2022-06-15

## 2022-06-21 ENCOUNTER — HOSPITAL ENCOUNTER (OUTPATIENT)
Dept: NON INVASIVE DIAGNOSTICS | Age: 67
Discharge: HOME | End: 2022-06-21
Payer: MEDICARE

## 2022-06-21 DIAGNOSIS — I48.0 PAF (PAROXYSMAL ATRIAL FIBRILLATION) (HCC): ICD-10-CM

## 2022-06-21 DIAGNOSIS — I77.810 AORTIC ROOT DILATION (HCC): ICD-10-CM

## 2022-06-21 LAB
LV EF: 65 %
LVEF MODALITY: NORMAL

## 2022-06-21 PROCEDURE — 93306 TTE W/DOPPLER COMPLETE: CPT

## 2022-06-29 ENCOUNTER — TELEPHONE (OUTPATIENT)
Dept: FAMILY MEDICINE CLINIC | Age: 67
End: 2022-06-29

## 2022-06-29 DIAGNOSIS — E78.5 DYSLIPIDEMIA: ICD-10-CM

## 2022-06-30 RX ORDER — PRAVASTATIN SODIUM 20 MG
TABLET ORAL
Qty: 90 TABLET | Refills: 0 | Status: SHIPPED | OUTPATIENT
Start: 2022-06-30 | End: 2022-09-27

## 2022-06-30 NOTE — TELEPHONE ENCOUNTER
Call,  Very overdue for lipids .   Labs are in our system  Please ask him to get   Appt in July  Will fill rx

## 2022-06-30 NOTE — TELEPHONE ENCOUNTER
Patient has future appt scheduled for 7/19/22   [facility] for this. Scanned in media 3/10/22  Does patient need additional labs ?

## 2022-08-16 ENCOUNTER — OFFICE VISIT (OUTPATIENT)
Dept: FAMILY MEDICINE CLINIC | Age: 67
End: 2022-08-16
Payer: MEDICARE

## 2022-08-16 VITALS
DIASTOLIC BLOOD PRESSURE: 74 MMHG | BODY MASS INDEX: 31.7 KG/M2 | SYSTOLIC BLOOD PRESSURE: 122 MMHG | HEIGHT: 69 IN | OXYGEN SATURATION: 98 % | RESPIRATION RATE: 16 BRPM | WEIGHT: 214 LBS | HEART RATE: 64 BPM

## 2022-08-16 DIAGNOSIS — E11.9 CONTROLLED TYPE 2 DIABETES MELLITUS WITHOUT COMPLICATION, WITHOUT LONG-TERM CURRENT USE OF INSULIN (HCC): Primary | ICD-10-CM

## 2022-08-16 DIAGNOSIS — I10 ESSENTIAL HYPERTENSION: ICD-10-CM

## 2022-08-16 DIAGNOSIS — Z13.6 SCREENING FOR AAA (ABDOMINAL AORTIC ANEURYSM): ICD-10-CM

## 2022-08-16 DIAGNOSIS — E11.9 CONTROLLED TYPE 2 DIABETES MELLITUS WITHOUT COMPLICATION, WITHOUT LONG-TERM CURRENT USE OF INSULIN (HCC): ICD-10-CM

## 2022-08-16 DIAGNOSIS — Z79.01 CURRENT USE OF LONG TERM ANTICOAGULATION: ICD-10-CM

## 2022-08-16 LAB
A/G RATIO: 2.2 (ref 1.1–2.2)
ALBUMIN SERPL-MCNC: 4.4 G/DL (ref 3.4–5)
ALP BLD-CCNC: 100 U/L (ref 40–129)
ALT SERPL-CCNC: 22 U/L (ref 10–40)
ANION GAP SERPL CALCULATED.3IONS-SCNC: 13 MMOL/L (ref 3–16)
AST SERPL-CCNC: 22 U/L (ref 15–37)
BILIRUB SERPL-MCNC: 0.3 MG/DL (ref 0–1)
BUN BLDV-MCNC: 21 MG/DL (ref 7–20)
CALCIUM SERPL-MCNC: 9.7 MG/DL (ref 8.3–10.6)
CHLORIDE BLD-SCNC: 102 MMOL/L (ref 99–110)
CO2: 24 MMOL/L (ref 21–32)
CREAT SERPL-MCNC: 0.9 MG/DL (ref 0.8–1.3)
GFR AFRICAN AMERICAN: >60
GFR NON-AFRICAN AMERICAN: >60
GLUCOSE BLD-MCNC: 125 MG/DL (ref 70–99)
HBA1C MFR BLD: 6.3 %
HCT VFR BLD CALC: 46.7 % (ref 40.5–52.5)
HEMOGLOBIN: 16 G/DL (ref 13.5–17.5)
POTASSIUM SERPL-SCNC: 4.6 MMOL/L (ref 3.5–5.1)
SODIUM BLD-SCNC: 139 MMOL/L (ref 136–145)
TOTAL PROTEIN: 6.4 G/DL (ref 6.4–8.2)

## 2022-08-16 PROCEDURE — 3044F HG A1C LEVEL LT 7.0%: CPT | Performed by: INTERNAL MEDICINE

## 2022-08-16 PROCEDURE — 83036 HEMOGLOBIN GLYCOSYLATED A1C: CPT | Performed by: INTERNAL MEDICINE

## 2022-08-16 PROCEDURE — 99214 OFFICE O/P EST MOD 30 MIN: CPT | Performed by: INTERNAL MEDICINE

## 2022-08-16 PROCEDURE — 1123F ACP DISCUSS/DSCN MKR DOCD: CPT | Performed by: INTERNAL MEDICINE

## 2022-08-16 SDOH — ECONOMIC STABILITY: FOOD INSECURITY: WITHIN THE PAST 12 MONTHS, THE FOOD YOU BOUGHT JUST DIDN'T LAST AND YOU DIDN'T HAVE MONEY TO GET MORE.: NEVER TRUE

## 2022-08-16 SDOH — ECONOMIC STABILITY: FOOD INSECURITY: WITHIN THE PAST 12 MONTHS, YOU WORRIED THAT YOUR FOOD WOULD RUN OUT BEFORE YOU GOT MONEY TO BUY MORE.: NEVER TRUE

## 2022-08-16 ASSESSMENT — SOCIAL DETERMINANTS OF HEALTH (SDOH): HOW HARD IS IT FOR YOU TO PAY FOR THE VERY BASICS LIKE FOOD, HOUSING, MEDICAL CARE, AND HEATING?: NOT HARD AT ALL

## 2022-08-16 NOTE — PROGRESS NOTES
Viktor Newton (:  1955) is a 77 y.o. male, here for evaluation of the following chief complaint(s):  Diabetes (Pt is doing well. Pt chks sugars 1-2 times a day )         ASSESSMENT/PLAN:    Jarrod Abarca was seen today for diabetes. Diagnoses and all orders for this visit:    Controlled type 2 diabetes mellitus without complication, without long-term current use of insulin (HCC)  -     POCT glycosylated hemoglobin (Hb A1C)  -     Comprehensive Metabolic Panel; Future    Essential hypertension  benazepril   -     Comprehensive Metabolic Panel; Future    Screening for AAA (abdominal aortic aneurysm)  -     VL AAA SCREENING; Future    Current use of long term anticoagulation  -     Hemoglobin and Hematocrit;  Future    Follow up 6 m   Declines awe  Will get [facility]  Continue the same meds  No change dm meds  Metformin and farxiga    SUBJECTIVE/OBJECTIVE:  Diabetes      DM  Little lower  Watching diet  Trending a few points lower  Can tolerate metformin one in the am and 2 evening  On farxiga  10 mg   started  2-3 months ago  Hga1c  6.3   Tries to not eat as much  Highest reading is in the am when he gets up  Am glucose is lower overall  Can eat bfast  drops  112    Htn   On benazepril  No cough    On  eliquis from cardiology for afib  No blood    On pravachol  no myalgias      Lab Results   Component Value Date    LABA1C 6.8 2022    LABA1C 6.6 2021    LABA1C 6.6 2021    LABMICR <1.20 2020    LABMICR <1.20 2017       Lab Results   Component Value Date     2022     2021     2021    K 5.1 2022    K 4.6 2021    K 4.5 2021     2022     2021     2021    CO2 24 2022    CO2 21 2021    CO2 23 2021    BUN 24 (H) 2022    BUN 19 2021    BUN 23 2021    CREATININE 1.0 2022    CREATININE 0.8 2021    CREATININE 0.78 2021    GLUCOSE 126 (H) 05/25/2022    GLUCOSE 121 (H) 09/24/2021    GLUCOSE 105 (H) 05/19/2021    CALCIUM 9.7 05/25/2022    CALCIUM 9.7 09/24/2021    CALCIUM 8.8 05/19/2021       Lab Results   Component Value Date    CHOL 123 02/17/2021    CHOL 144 05/03/2018    CHOL 98 05/03/2018    CHOL 144 05/03/2018    CHOL 98 05/03/2018    TRIG 94 02/17/2021    TRIG 140 05/03/2018    TRIG 140 05/03/2018    HDL 42 02/17/2021    HDL 43 04/17/2019    HDL 46 05/03/2018    HDL 46 05/03/2018    LDLCALC 63 02/17/2021    LDLCALC 67 04/17/2019    LDLCALC 76 05/03/2018    LDLCALC 76 05/03/2018       Lab Results   Component Value Date    ALT 28 09/24/2021    ALT 22 05/19/2021    ALT 36 05/13/2020    AST 19 09/24/2021    AST 17 05/19/2021    AST 24 05/13/2020       Lab Results   Component Value Date    TSH 0.71 04/16/2019    TSH 1.19 10/28/2013    T4FREE 1.3 04/16/2019    T4FREE 1.1 10/28/2013       Lab Results   Component Value Date    WBC 6.0 02/24/2021    WBC 8.2 04/17/2019    WBC 10.6 04/16/2019    HGB 15.1 02/24/2021    HGB 14.3 04/17/2019    HGB 17.0 04/16/2019    HCT 44.2 02/24/2021    HCT 42.1 04/17/2019    HCT 49.6 04/16/2019    MCV 96.7 02/24/2021    MCV 98.3 04/17/2019    MCV 97.5 04/16/2019     02/24/2021     04/17/2019     04/16/2019       No results found for: INR     Lab Results   Component Value Date    PSA 1.0 08/13/2018    PSA 0.77 10/28/2013        Lab Results   Component Value Date    LABURIC 5.2 09/24/2021           Review of Systems       Objective   Physical Exam  Constitutional:       Appearance: Normal appearance. HENT:      Head: Normocephalic and atraumatic. Pulmonary:      Effort: Pulmonary effort is normal.   Neurological:      Mental Status: He is alert. Psychiatric:         Mood and Affect: Mood normal.         Behavior: Behavior normal.         Thought Content:  Thought content normal.         Judgment: Judgment normal.          Benitez Cervantes MD

## 2022-08-17 DIAGNOSIS — Z12.5 SCREENING FOR PROSTATE CANCER: ICD-10-CM

## 2022-08-17 DIAGNOSIS — I10 ESSENTIAL HYPERTENSION: ICD-10-CM

## 2022-08-17 DIAGNOSIS — E11.9 CONTROLLED TYPE 2 DIABETES MELLITUS WITHOUT COMPLICATION, WITHOUT LONG-TERM CURRENT USE OF INSULIN (HCC): Primary | ICD-10-CM

## 2022-08-17 DIAGNOSIS — E78.5 DYSLIPIDEMIA: ICD-10-CM

## 2022-08-17 DIAGNOSIS — D58.2 ELEVATED HEMOGLOBIN (HCC): ICD-10-CM

## 2022-08-17 NOTE — TELEPHONE ENCOUNTER
2022       Marian Marrero DO  [id number]  [place] [address]  Via In Basket      Patient: Christine Moreno   YOB: 1959   Date of Visit: 2022       Dear Dr. Marrero:    I saw your patient, Christine Moreno, for an evaluation. Below are my notes for this visit with her.    If you have questions, please do not hesitate to call me.          Sincerely,        Millie Vazquez MD        CC: No Recipients  Millie Vazquez MD  2022 12:45 PM  Sign when Signing Visit  Plastic and Reconstructive Surgery Consult Note    REASON FOR VISIT  Scar on nose s/p mole removal    HISTORY OF PRESENT ILLNESS  Christine Moreno is a 62 year old female who presents to [facility] Surgery Clinic for evaluation. 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  She healed fine afterwards but has become unhappy with the appearance.  Her area of greatest concern is with the tip of her nose. She does not like how the contour appears thick in that region. The remainder of her nose she can camouflage well with makeup.  She comes in today to discuss treatment options.     MEDICAL HISTORY  No past medical history on file.    SURGICAL HISTORY  Past Surgical History:   Procedure Laterality Date   •  section, low transverse      2   • D and c     • Skin graft  1964    Skin graft after birth meli removal nose       MEDICATIONS  Current Outpatient Medications   Medication Sig Dispense Refill   • Multiple Vitamin (MULTIVITAMIN ADULT PO) Take 1 capsule by mouth daily.     • Cyanocobalamin (VITAMIN B 12 PO) Take 1 tablet by mouth daily.     • FIBER PO      • Probiotic Product (PROBIOTIC PO)      • ibuprofen (MOTRIN) 800 MG tablet Take 1 tablet by mouth every 8 hours as needed for Pain. 30 tablet 0     No current facility-administered medications for this visit.       ALLERGIES  ALLERGIES:  No Known  Called patient  Patient scheduled preop appt different day. Allergies    SOCIAL HISTORY  Social History     Socioeconomic History   • Marital status: /Civil Union     Spouse name: Not on file   • Number of children: Not on file   • Years of education: Not on file   • Highest education level: Not on file   Occupational History   • Not on file   Tobacco Use   • Smoking status: Former Smoker     Types: Cigarettes     Quit date: 1990     Years since quittin.6   • Smokeless tobacco: Never Used   Vaping Use   • Vaping Use: never used   Substance and Sexual Activity   • Alcohol use: Yes     Alcohol/week: 7.0 standard drinks     Types: 7 Glasses of wine per week   • Drug use: No   • Sexual activity: Not on file   Other Topics Concern   • Not on file   Social History Narrative   • Not on file     Social Determinants of Health     Financial Resource Strain: Not on file   Food Insecurity: Not on file   Transportation Needs: Not on file   Physical Activity: Not on file   Stress: Not on file   Social Connections: Not on file   Intimate Partner Violence: Not on file        FAMILY HISTORY  Family History   Problem Relation Age of Onset   • Dementia/Alzheimers Mother    • Hypertension Mother    • Hypotension Father    • Cervical cancer Sister    • Neurological Disorder Son         Down's and autism     REVIEW OF SYSTEMS  All pertinent positive and negative ROS (Review of Systems) as outlined in the HPI (History of Present Illess).  Constitutional: Negative for Fever, Weight loss or gain, loss of energy, difficulty performing chores  Eyes: Negative for Dry Eyes, Blurry Vision, or Double Vision  Ears, Nose, and Throat: Negative for Difficulty hearing, ringing in ears, runny nose, or sore throat  Cardiovascular: Negative for Rapid heart rate, Palpitations, Chest Pain, or Swollen ankles  Respiratory: Negative for Chronic cough, wheezing, or shortness of breath  Gastrointestinal: Negative for abdominal pain, diarrhea, constipation, blood in stool, heart burn, nausea, abdominal  bloating  Musculoskeletal: Negative for multiple joint pains, chronic muscle aches, back pain, or neck pain  Skin/breast: Negative for breast pain, breast mass, skin lesion, or rash  Neurological: Negative for Numbness, Tingling, Seizures, or tremor  Mind: Negative    Endocrine: Negative for hot or cold intolerance, unexplained weight loss or gain, abnormal milk production  Hematologic/Lymphatic: Negative for easy bruising or bleeding, or swollen lymph nodes  Allergic/Immunologic: Negative     PHYSICAL EXAM  Ht Readings from Last 1 Encounters:   08/17/22 5' 9\" (1.753 m)     Wt Readings from Last 1 Encounters:   07/12/22 65.3 kg (144 lb)     Body mass index is 21.27 kg/m².  Body surface area is 1.8 meters squared.  Visit Vitals  /70 (BP Location: RUE - Right upper extremity, Patient Position: Sitting, Cuff Size: Regular)   Pulse 70   Resp 20   Ht 5' 9\" (1.753 m)   BMI 21.27 kg/m²       General Exam - The patient appears her stated age in no apparent distress.  Neurologic - Alert and oriented to person, place, and time.   Psychiatric - Pleasant mood and affect.  Musculoskeletal - Ambulates without the use of an assistive device.    Extremities - No obvious weakness of the upper or lower extremities.    HENT - Normocephalic.  Atraumatic.  Bilateral external ears normal. She is s/p skin graft to the nose.  The incisions are well healed.  The tip of the nose does have an irregular contour.  The skin graft itself appears thin and the lower lateral cartilages are protruding through it.  Neck - Normal range of motion.   Respiratory - No respiratory distress, breathing comfortably on room air.   Cardiac - Palpable pulses present.    ASSESSMENT  Christine Moreno is a 62 year old female who presents to Plastic Surgery Clinic for evaluation. I discussed with her that the best way to reconstruct the nasal tip subunit would be a forehead flap. The procedure was discussed in detail, however she is not interested in that.    I  told her too that I could try to excise the current graft on the nasal tip subunit, +/- trim her lower lateral cartilages and apply Integra to this area. This would leave her with an open wound for 3-4 weeks that she would have to perform dressing changes on.  Then I could bring her back and do a very thin FTSG to this area to see if this improves the contour of the nasal tip. The graft would look like a patch but hopefully the contour would be smoother so she could camouflage the area better with makeup.  We would submit this to insurance.  She is interested in seeing what they say and would like to think about this option.  This option sounds more doable to her than the forehead flap.        General risks associated with surgery were discussed with the patient.  They include but are not limited to anesthesia, deep vein thrombosis, pulmonary embolus, injury to blood vessels and nerves, paralysis, stroke, heart attack, and death.  Additional risks were discussed and include but are not limited to infection, bleeding, wound healing problems, seroma, hematoma, damage to underlying structures, and need for re-operation.  All of her questions were answered and she confirmed understanding.      PLAN  - Insurance pre-authorization sent for excision nasal skin graft, Integra placement, followed by FTSG 3-4 weeks later.    - She would need to see her PCP for preoperative H&P and clearance  - She would need a preoperative visit in plastic surgery clinic      Lul Schmitz MD, thank you so much for allowing me the opportunity to assist you in the care of this patient.  Please contact me if you have any questions or concerns.  I look forward to being of further service to you and will continue to keep you informed of any and all subsequent developments in care.    Millie Vazquez MD.  Plastic and Reconstructive Surgery  [facility]  [place]  Pager:  [phone]

## 2022-08-29 ENCOUNTER — HOSPITAL ENCOUNTER (OUTPATIENT)
Dept: ULTRASOUND IMAGING | Age: 67
Discharge: HOME | End: 2022-08-29
Payer: MEDICARE

## 2022-08-29 DIAGNOSIS — Z13.6 SCREENING FOR AAA (ABDOMINAL AORTIC ANEURYSM): ICD-10-CM

## 2022-08-29 PROCEDURE — 76706 US ABDL AORTA SCREEN AAA: CPT

## 2022-09-12 ENCOUNTER — TELEPHONE (OUTPATIENT)
Dept: FAMILY MEDICINE CLINIC | Age: 67
End: 2022-09-12

## 2022-09-12 DIAGNOSIS — E11.9 CONTROLLED TYPE 2 DIABETES MELLITUS WITHOUT COMPLICATION, WITHOUT LONG-TERM CURRENT USE OF INSULIN (HCC): Primary | ICD-10-CM

## 2022-09-12 RX ORDER — APIXABAN 5 MG/1
TABLET, FILM COATED ORAL
Qty: 180 TABLET | Refills: 1 | Status: SHIPPED | OUTPATIENT
Start: 2022-09-12

## 2022-09-12 RX ORDER — DAPAGLIFLOZIN 10 MG/1
TABLET, FILM COATED ORAL
Qty: 30 TABLET | Refills: 3 | Status: SHIPPED | OUTPATIENT
Start: 2022-09-12

## 2022-09-12 NOTE — TELEPHONE ENCOUNTER
Pt does not think he should have to have blood work again. States he just had labs 2 weeks ago and has been on the [place] for 4 months.

## 2022-09-12 NOTE — TELEPHONE ENCOUNTER
Pls tell pt I am refilling the farxiga. I want to check another metabolic profile   To make sure the kidneys are ok with Stephen  was placed.

## 2022-09-27 DIAGNOSIS — E78.5 DYSLIPIDEMIA: ICD-10-CM

## 2022-09-27 RX ORDER — PRAVASTATIN SODIUM 20 MG
TABLET ORAL
Qty: 90 TABLET | Refills: 0 | Status: SHIPPED | OUTPATIENT
Start: 2022-09-27

## 2022-11-02 RX ORDER — METFORMIN HYDROCHLORIDE 500 MG/1
TABLET, EXTENDED RELEASE ORAL
Qty: 270 TABLET | Refills: 1 | Status: SHIPPED | OUTPATIENT
Start: 2022-11-02

## 2022-11-28 ENCOUNTER — OFFICE VISIT (OUTPATIENT)
Dept: CARDIOLOGY CLINIC | Age: 67
End: 2022-11-28
Payer: MEDICARE

## 2022-11-28 VITALS
HEART RATE: 57 BPM | BODY MASS INDEX: 32.12 KG/M2 | WEIGHT: 216.9 LBS | HEIGHT: 69 IN | OXYGEN SATURATION: 94 % | SYSTOLIC BLOOD PRESSURE: 124 MMHG | DIASTOLIC BLOOD PRESSURE: 64 MMHG

## 2022-11-28 DIAGNOSIS — I10 PRIMARY HYPERTENSION: ICD-10-CM

## 2022-11-28 DIAGNOSIS — I48.0 PAF (PAROXYSMAL ATRIAL FIBRILLATION) (HCC): Primary | ICD-10-CM

## 2022-11-28 DIAGNOSIS — I77.810 AORTIC ROOT DILATION (HCC): ICD-10-CM

## 2022-11-28 PROCEDURE — 3074F SYST BP LT 130 MM HG: CPT | Performed by: NURSE PRACTITIONER

## 2022-11-28 PROCEDURE — 99214 OFFICE O/P EST MOD 30 MIN: CPT | Performed by: NURSE PRACTITIONER

## 2022-11-28 PROCEDURE — 1123F ACP DISCUSS/DSCN MKR DOCD: CPT | Performed by: NURSE PRACTITIONER

## 2022-11-28 PROCEDURE — 3078F DIAST BP <80 MM HG: CPT | Performed by: NURSE PRACTITIONER

## 2022-11-28 NOTE — PROGRESS NOTES
[facility]     Outpatient Follow Up Note    Babita Hooper is 79 y.o. male who presents today with a history of dilated AoR, PAF and HTN. CHIEF COMPLAINT / HPI:  Follow Up secondary to paroxysmal atrial fibrillation    Subjective: When he first was diagnosed with AF he googled what would cause it. He found that it could have been from steroids of which he had an injection earlier that particular day. He's taken shots since then without having a recurrence attributed to his medications. he denies significant chest pain. There is no SOB/YANEZ. The patient denies orthopnea/PND. The patient does not have swelling. The patients weight is stable . The patient is not experiencing dizziness. These symptoms show no change since the last OV. With regard to medication therapy the patient has been compliant with prescribed regimen. They have tolerated therapy to date. Past Medical History:   Diagnosis Date    Arthritis     Dyslipidemia     H/O low back pain     Mild     Hypertension     Kidney stone      Social History:    Social History     Tobacco Use   Smoking Status Former    Packs/day: 0.50    Years: 40.00    Pack years: 20.00    Types: Cigarettes    Start date: 1976    Quit date: 2016    Years since quittin.8   Smokeless Tobacco Never   Tobacco Comments    smokes cigars 1-2 a week     Current Medications:  Current Outpatient Medications   Medication Sig Dispense Refill    metFORMIN (GLUCOPHAGE-XR) 500 MG extended release tablet TAKE 1 TABLET EVERY MORNING AND 2 TABLETS EVERY EVENING 270 tablet 1    pravastatin (PRAVACHOL) 20 MG tablet TAKE 1 TABLET DAILY 90 tablet 0    ELIQUIS 5 MG TABS tablet TAKE 1 TABLET TWICE A  tablet 1    FARXIGA 10 MG tablet TAKE 1 TABLET EVERY MORNING 30 tablet 3    benazepril (LOTENSIN) 40 MG tablet TAKE 1 TABLET DAILY 90 tablet 1    dilTIAZem (CARDIZEM CD) 240 MG extended release capsule TAKE 1 CAPSULE DAILY.  90 capsule 2    Esomeprazole Magnesium (NEXIUM PO) Take by mouth      blood glucose test strips (ASCENSIA AUTODISC VI;ONE TOUCH ULTRA TEST VI) strip 1 each by In Vitro route 2 times daily One touch Ultra brand 200 strip 3    Lancets MISC 1 each by Does not apply route 2 times daily (Dispense One touch delica plus lancets 30 gauge) 200 each 3    ONETOUCH DELICA LANCETS FINE MISC 3 each by Does not apply route 3 times daily 300 each 3    Blood Glucose Monitoring Suppl (ONE TOUCH ULTRA 2) w/Device KIT 1 kit by Other route 3 times daily 1 kit 3     No current facility-administered medications for this visit. REVIEW OF SYSTEMS:    CONSTITUTIONAL: No major weight gain or loss, fatigue, weakness, night sweats or fever. HEENT: No new vision difficulties or ringing in the ears. RESPIRATORY: No new SOB, PND, orthopnea or cough. CARDIOVASCULAR: See HPI  GI: No nausea, vomiting, diarrhea, constipation, abdominal pain or changes in bowel habits. : No urinary frequency, urgency, incontinence; hematuria   SKIN: No cyanosis or skin lesions. MUSCULOSKELETAL: No new muscle or joint pain. NEUROLOGICAL: No syncope or TIA-like symptoms. PSYCHIATRIC: No anxiety, pain, insomnia or depression    Objective:   PHYSICAL EXAM:    Vitals:    11/28/22 0923   BP: 124/64   Site: Right Upper Arm   Cuff Size: Medium Adult   Pulse: 57   SpO2: 94%   Weight: 216 lb 14.4 oz (98.4 kg)   Height: 5' 9\" (1.753 m)           VITALS:  /64 (Site: Right Upper Arm, Cuff Size: Medium Adult)   Pulse 57   Ht 5' 9\" (1.753 m)   Wt 216 lb 14.4 oz (98.4 kg)   SpO2 94%   BMI 32.03 kg/m²   CONSTITUTIONAL: Cooperative, no apparent distress, and appears well nourished / developed  NEUROLOGIC:  Awake and orientated to person, place and time. PSYCH: Calm affect. SKIN: Warm and dry. HEENT: Sclera non-icteric, normocephalic, neck supple, no elevation of JVP, normal carotid pulses with no bruits and thyroid normal size.   LUNGS:  No increased work of breathing and clear to auscultation, no crackles or wheezing  CARDIOVASCULAR:  Regular rate 68 and rhythm with no murmurs, gallops, rubs, or abnormal heart sounds, normal PMI. The apical impulses not displaced  JVP less than 8 cm H2O  Heart tones are crisp and normal  Cervical veins are not engorged  The carotid upstroke is normal in amplitude and contour without delay or bruit  JVP is not elevated  ABDOMEN:  Normal bowel sounds, non-distended and non-tender to palpation  EXT: No edema, no calf tenderness. Pulses are present bilaterally. DATA:    Lab Results   Component Value Date    ALT 22 08/16/2022    AST 22 08/16/2022    ALKPHOS 100 08/16/2022    BILITOT 0.3 08/16/2022     Lab Results   Component Value Date    CREATININE 0.9 08/16/2022    BUN 21 (H) 08/16/2022     08/16/2022    K 4.6 08/16/2022     08/16/2022    CO2 24 08/16/2022     No components found for: CHLPL  Lab Results   Component Value Date    TRIG 94 02/17/2021    TRIG 140 05/03/2018    TRIG 140 05/03/2018     Lab Results   Component Value Date    HDL 42 02/17/2021    HDL 43 04/17/2019    HDL 46 05/03/2018    HDL 46 05/03/2018     Lab Results   Component Value Date    LDLCALC 63 02/17/2021    LDLCALC 67 04/17/2019    LDLCALC 76 05/03/2018    LDLCALC 76 05/03/2018     Lab Results   Component Value Date    LABVLDL 31 04/17/2019    LABVLDL 27 08/19/2010     Radiology Review:  Pertinent images / reports were reviewed as a part of this visit and reveals the following:    [id number] Score for Atrial Fibrillation Stroke Risk   Risk   Factors  Component Value   C CHF No 0   H HTN Yes 1   A2 Age >= 76 No,  (78 y.o.) 0   D DM Yes 1   S2 Prior Stroke/TIA No 0   V Vascular Disease No 0   A Age 74-69 Yes,  (78 y.o.) 1   Sc Sex male 0    [id number]  Score  3   Score last updated 11/28/22 10:19 AM EST     Echo: April '19:  Summary   Mod. Conc. LVH; Estimated ejection fraction is 60%. The left atrium is moderately dilated. Mild aortic regurgitation is present.    The ascending aorta is mildly dilated at  4.2 cm. Trivial Pulmonic and Tricuspid Regurgitation. Event Monitor: 5/14 -6/12/19: AF RVR      avg HR 69 min 31 max 146      AF burden < 1%      Echo: July '20:  Summary   Normal left ventricle size, wall thickness and systolic function with an   estimated ejection fraction of 55-60%. No regional wall motion abnormalities are seen. Normal diastolic function. The ascending aorta is mildly dilated at 4.3 cm. Mild aortic regurgitation. Trivial tricuspid regurgitation. PASP of 25 mmHg. Echo: Jun '22:   Summary   Moderate concentric left ventricular hypertrophy. Normal left ventricle size   and systolic function with an estimated ejection fraction of 65%. No   regional wall motion abnormalities are seen. Normal diastolic function for age. The aortic valve is sclerotic with normal excursion. Mild aortic   insufficiency. Mild tricuspid regurgitation with an estimated PASP of 20 mmHg. The aortic root is dilated measuring at 4.4 cm. The ascending aorta is   dilated measuring at 4.3 cm. Assessment:      Diagnosis Orders   1. PAF (paroxysmal atrial fibrillation) (HCC)   ~stable : denies recurrence of AF    ~AP regular  ~diltiazem with DOAC  ~LA mod dilated on echo   ~CHADs 3  ~AF burden < 1% on Event monitor '19  ~he is inquiring about an RFA as 79 yo mother recently had (AVNRT)        2. primary hypertension   ~controlled on Lotensin     3. Aortic root dilation (HCC)   ~stable: BP controlled   ~dilated at 4.4 cm on echo from June '22; 4.3 cm on echo from July '20 compared to 4.2 cm in '19      I had the opportunity to review the clinical symptoms and presentation of Carie Blanc. Plan:     1. Continue present management ; he wants to think about seeing Dr. Rabia Perry / Liliya Blair for invasive treatment options  2.  F/U in 6 months    Overall the patient is stable from CV standpoint    I have addresed the patient's cardiac risk factors and adjusted pharmacologic treatment as needed. In addition, I have reinforced the need for patient directed risk factor modification. Further evaluation will be based upon the patient's clinical course and testing results. All questions and concerns were addressed to the patient. Alternatives to my treatment were discussed. The patient is not currently smoking. The risks related to smoking were reviewed with the patient. Recommend maintaining a smoke-free lifestyle    Patient is not on a beta-blocker : neg MI  Patient is on an ace-i  Patient is not on a statin : neg CAD / hyperlipidemia     anti-coagulation has been recommended / prescribed for this patient. Education conducted on adverse reactions including bleeding was discussed. The patient verbalizes understanding not to stop medications without discussing with us. Discussed exercise: 30-60 minutes 7 days/week   Discussed diet. SMBG 112 before lunch yesterday  His BS are typically 120s then drops later on. He's now on Farxiga and his sugars have improved. Trulicity never seemed to have helped. Thank you for allowing to us to participate in the care of Deepa Mahoney.     GAL Bran    Documentation of today's visit sent to PCP

## 2022-12-07 RX ORDER — BENAZEPRIL HYDROCHLORIDE 40 MG/1
TABLET, FILM COATED ORAL
Qty: 90 TABLET | Refills: 1 | Status: SHIPPED | OUTPATIENT
Start: 2022-12-07

## 2022-12-26 DIAGNOSIS — E78.5 DYSLIPIDEMIA: ICD-10-CM

## 2022-12-27 DIAGNOSIS — E78.5 DYSLIPIDEMIA: ICD-10-CM

## 2022-12-27 RX ORDER — PRAVASTATIN SODIUM 20 MG
TABLET ORAL
Qty: 90 TABLET | Refills: 0 | Status: SHIPPED | OUTPATIENT
Start: 2022-12-27

## 2022-12-27 RX ORDER — PRAVASTATIN SODIUM 20 MG
TABLET ORAL
Qty: 90 TABLET | Refills: 0 | OUTPATIENT
Start: 2022-12-27

## 2023-01-16 RX ORDER — DAPAGLIFLOZIN 10 MG/1
TABLET, FILM COATED ORAL
Qty: 90 TABLET | Refills: 1 | Status: SHIPPED | OUTPATIENT
Start: 2023-01-16

## 2023-03-01 ENCOUNTER — OFFICE VISIT (OUTPATIENT)
Dept: FAMILY MEDICINE CLINIC | Age: 68
End: 2023-03-01

## 2023-03-01 VITALS
DIASTOLIC BLOOD PRESSURE: 70 MMHG | OXYGEN SATURATION: 93 % | BODY MASS INDEX: 32.44 KG/M2 | RESPIRATION RATE: 16 BRPM | HEART RATE: 61 BPM | WEIGHT: 219 LBS | SYSTOLIC BLOOD PRESSURE: 124 MMHG | HEIGHT: 69 IN

## 2023-03-01 DIAGNOSIS — I48.0 PAF (PAROXYSMAL ATRIAL FIBRILLATION) (HCC): ICD-10-CM

## 2023-03-01 DIAGNOSIS — I10 ESSENTIAL HYPERTENSION: ICD-10-CM

## 2023-03-01 DIAGNOSIS — I77.810 AORTIC ROOT DILATION (HCC): ICD-10-CM

## 2023-03-01 DIAGNOSIS — E78.5 DYSLIPIDEMIA: ICD-10-CM

## 2023-03-01 DIAGNOSIS — E11.9 CONTROLLED TYPE 2 DIABETES MELLITUS WITHOUT COMPLICATION, WITHOUT LONG-TERM CURRENT USE OF INSULIN (HCC): Primary | ICD-10-CM

## 2023-03-01 LAB — HBA1C MFR BLD: 6.6 %

## 2023-03-01 RX ORDER — PRAVASTATIN SODIUM 20 MG
TABLET ORAL
Qty: 90 TABLET | Refills: 2 | Status: SHIPPED | OUTPATIENT
Start: 2023-03-01

## 2023-03-01 SDOH — ECONOMIC STABILITY: FOOD INSECURITY: WITHIN THE PAST 12 MONTHS, THE FOOD YOU BOUGHT JUST DIDN'T LAST AND YOU DIDN'T HAVE MONEY TO GET MORE.: NEVER TRUE

## 2023-03-01 SDOH — ECONOMIC STABILITY: INCOME INSECURITY: HOW HARD IS IT FOR YOU TO PAY FOR THE VERY BASICS LIKE FOOD, HOUSING, MEDICAL CARE, AND HEATING?: NOT HARD AT ALL

## 2023-03-01 SDOH — ECONOMIC STABILITY: FOOD INSECURITY: WITHIN THE PAST 12 MONTHS, YOU WORRIED THAT YOUR FOOD WOULD RUN OUT BEFORE YOU GOT MONEY TO BUY MORE.: NEVER TRUE

## 2023-03-01 SDOH — ECONOMIC STABILITY: HOUSING INSECURITY
IN THE LAST 12 MONTHS, WAS THERE A TIME WHEN YOU DID NOT HAVE A STEADY PLACE TO SLEEP OR SLEPT IN A SHELTER (INCLUDING NOW)?: NO

## 2023-03-01 ASSESSMENT — PATIENT HEALTH QUESTIONNAIRE - PHQ9
1. LITTLE INTEREST OR PLEASURE IN DOING THINGS: 0
SUM OF ALL RESPONSES TO PHQ QUESTIONS 1-9: 0
SUM OF ALL RESPONSES TO PHQ9 QUESTIONS 1 & 2: 0
2. FEELING DOWN, DEPRESSED OR HOPELESS: 0
SUM OF ALL RESPONSES TO PHQ QUESTIONS 1-9: 0

## 2023-03-01 NOTE — PROGRESS NOTES
Dale Patterson (:  1955) is a 79 y.o. male, here for evaluation of the following chief complaint(s):  Diabetes (Patient is here for a diabetes follow up )           Assessment/Plan  Cammy Schwab was seen today for diabetes. Diagnoses and all orders for this visit:    Controlled type 2 diabetes mellitus without complication, without long-term current use of insulin (HCC)  -     POCT glycosylated hemoglobin (Hb A1C)    Essential hypertension  benazepril     Aortic root dilation (HCC)- 4.3 cm cardiology following , echos q yr with dr Siri Alexandre    PAF (paroxysmal atrial fibrillation) (HCC)    Dyslipidemia declines medication    -     pravastatin (PRAVACHOL) 20 MG tablet; TAKE 1 TABLET DAILY   Does not want AWE  Follow up  5 month   Dm   Bp good  Needs labs     Subjective   SUBJECTIVE/OBJECTIVE:  Diabetes    DM- metformin,  pravachol  No uti or infection of penis   Never hypoglycemic  130's in the am  Hga1c 6.6  Not eating as well  Helping son in law build new house  Eating a lot of fast food    Sees cardiology for aortic root dilation and ho afib  Sees cardiology twice a yr    Sees derm twice a yr .     Htn  Not ck at home  On lotensin  40 mg and diltiazem  240      On pravachol for cholesterol    On eliquis twice  a day      Lab Results   Component Value Date    LABA1C 6.3 2022    LABA1C 6.8 2022    LABA1C 6.6 2021    LABMICR <1.20 2020    LABMICR <1.20 2017       Lab Results   Component Value Date     2022     2022     2021    K 4.6 2022    K 5.1 2022    K 4.6 2021     2022     2022     2021    CO2 24 2022    CO2 24 2022    CO2 21 2021    BUN 21 (H) 2022    BUN 24 (H) 2022    BUN 19 2021    CREATININE 0.9 2022    CREATININE 1.0 2022    CREATININE 0.8 2021    GLUCOSE 125 (H) 2022    GLUCOSE 126 (H) 2022    GLUCOSE 121 (H) 09/24/2021    CALCIUM 9.7 08/16/2022    CALCIUM 9.7 05/25/2022    CALCIUM 9.7 09/24/2021       Lab Results   Component Value Date    CHOL 123 02/17/2021    CHOL 144 05/03/2018    CHOL 98 05/03/2018    CHOL 144 05/03/2018    CHOL 98 05/03/2018    TRIG 94 02/17/2021    TRIG 140 05/03/2018    TRIG 140 05/03/2018    HDL 42 02/17/2021    HDL 43 04/17/2019    HDL 46 05/03/2018    HDL 46 05/03/2018    LDLCALC 63 02/17/2021    LDLCALC 67 04/17/2019    LDLCALC 76 05/03/2018    LDLCALC 76 05/03/2018       Lab Results   Component Value Date    ALT 22 08/16/2022    ALT 28 09/24/2021    ALT 22 05/19/2021    AST 22 08/16/2022    AST 19 09/24/2021    AST 17 05/19/2021       Lab Results   Component Value Date    TSH 0.71 04/16/2019    TSH 1.19 10/28/2013    T4FREE 1.3 04/16/2019    T4FREE 1.1 10/28/2013       Lab Results   Component Value Date    WBC 6.0 02/24/2021    WBC 8.2 04/17/2019    WBC 10.6 04/16/2019    HGB 16.0 08/16/2022    HGB 15.1 02/24/2021    HGB 14.3 04/17/2019    HCT 46.7 08/16/2022    HCT 44.2 02/24/2021    HCT 42.1 04/17/2019    MCV 96.7 02/24/2021    MCV 98.3 04/17/2019    MCV 97.5 04/16/2019     02/24/2021     04/17/2019     04/16/2019       Lab Results   Component Value Date    PSA 1.0 08/13/2018    PSA 0.77 10/28/2013        Lab Results   Component Value Date    LABURIC 5.2 09/24/2021        Vitals:    03/01/23 0800   BP: 124/70   Pulse: 61   Resp: 16   SpO2: 93%       BP Readings from Last 3 Encounters:   03/01/23 124/70   11/28/22 124/64   08/16/22 122/74        Wt Readings from Last 3 Encounters:   03/01/23 219 lb (99.3 kg)   11/28/22 216 lb 14.4 oz (98.4 kg)   08/16/22 214 lb (97.1 kg)        Review of Systems   Constitutional:  Negative for appetite change and unexpected weight change. Objective   Physical Exam  Constitutional:       Appearance: Normal appearance. HENT:      Head: Normocephalic and atraumatic.    Cardiovascular:      Rate and Rhythm: Normal rate and regular rhythm. Pulmonary:      Effort: Pulmonary effort is normal.      Breath sounds: Normal breath sounds. Neurological:      Mental Status: He is alert. Psychiatric:         Mood and Affect: Mood normal.         Behavior: Behavior normal.         Thought Content:  Thought content normal.         Judgment: Judgment normal.          Gonsalo Rosales MD

## 2023-03-06 LAB
ALBUMIN SERPL-MCNC: 4.3 G/DL
ALP BLD-CCNC: 83 U/L
ALT SERPL-CCNC: 20 U/L
ANION GAP SERPL CALCULATED.3IONS-SCNC: NORMAL MMOL/L
AST SERPL-CCNC: 17 U/L
AVERAGE GLUCOSE: NORMAL
BASOPHILS ABSOLUTE: NORMAL
BASOPHILS RELATIVE PERCENT: NORMAL
BILIRUB SERPL-MCNC: 0.5 MG/DL (ref 0.1–1.4)
BUN BLDV-MCNC: 20 MG/DL
CALCIUM SERPL-MCNC: 9.4 MG/DL
CHLORIDE BLD-SCNC: NORMAL MMOL/L
CHOLESTEROL, TOTAL: 115 MG/DL
CHOLESTEROL/HDL RATIO: NORMAL
CO2: NORMAL
CREAT SERPL-MCNC: 0.78 MG/DL
CREATININE URINE: 16 MG/DL
EGFR: 98
EOSINOPHILS ABSOLUTE: NORMAL
EOSINOPHILS RELATIVE PERCENT: NORMAL
GLUCOSE BLD-MCNC: 145 MG/DL
HBA1C MFR BLD: 6.9 %
HCT VFR BLD CALC: NORMAL %
HDLC SERPL-MCNC: 42 MG/DL (ref 35–70)
HEMOGLOBIN: 16.3 G/DL (ref 13.5–17.5)
LDL CHOLESTEROL CALCULATED: 55 MG/DL (ref 0–160)
LYMPHOCYTES ABSOLUTE: NORMAL
LYMPHOCYTES RELATIVE PERCENT: NORMAL
MCH RBC QN AUTO: NORMAL PG
MCHC RBC AUTO-ENTMCNC: NORMAL G/DL
MCV RBC AUTO: NORMAL FL
MICROALBUMIN/CREAT 24H UR: NORMAL MG/G{CREAT}
MONOCYTES ABSOLUTE: NORMAL
MONOCYTES RELATIVE PERCENT: NORMAL
NEUTROPHILS ABSOLUTE: NORMAL
NEUTROPHILS RELATIVE PERCENT: NORMAL
NONHDLC SERPL-MCNC: NORMAL MG/DL
PDW BLD-RTO: NORMAL %
PLATELET # BLD: 209 K/ΜL
PMV BLD AUTO: NORMAL FL
POTASSIUM SERPL-SCNC: 4.4 MMOL/L
PROSTATE SPECIFIC ANTIGEN: 1.28 NG/ML
RBC # BLD: 4.97 10^6/ΜL
SODIUM BLD-SCNC: 139 MMOL/L
TOTAL PROTEIN: 6.2
TRIGL SERPL-MCNC: 99 MG/DL
VLDLC SERPL CALC-MCNC: NORMAL MG/DL
WBC # BLD: 5.7 10^3/ML

## 2023-03-08 RX ORDER — DILTIAZEM HYDROCHLORIDE 240 MG/1
CAPSULE, COATED, EXTENDED RELEASE ORAL
Qty: 90 CAPSULE | Refills: 2 | Status: SHIPPED | OUTPATIENT
Start: 2023-03-08

## 2023-03-08 RX ORDER — APIXABAN 5 MG/1
TABLET, FILM COATED ORAL
Qty: 180 TABLET | Refills: 1 | Status: SHIPPED | OUTPATIENT
Start: 2023-03-08

## 2023-03-15 ENCOUNTER — TELEPHONE (OUTPATIENT)
Dept: FAMILY MEDICINE CLINIC | Age: 68
End: 2023-03-15

## 2023-03-15 NOTE — TELEPHONE ENCOUNTER
Please call patient. I have the [facility] lab results. The protein in the urine looks okay his cholesterol is excellent at 115 total the LDL is very low and the HDL is also low low at 42. You actually want the HDL higher if possible.   Exercise and weight loss sometimes might improve that    The kidney function was normal the hemoglobin A1c was 6.9 and the liver test was fine blood count is normal PSA is normal

## 2023-05-01 RX ORDER — METFORMIN HYDROCHLORIDE 500 MG/1
TABLET, EXTENDED RELEASE ORAL
Qty: 270 TABLET | Refills: 1 | Status: SHIPPED | OUTPATIENT
Start: 2023-05-01

## 2023-05-30 ENCOUNTER — OFFICE VISIT (OUTPATIENT)
Dept: CARDIOLOGY CLINIC | Age: 68
End: 2023-05-30
Payer: MEDICARE

## 2023-05-30 VITALS
OXYGEN SATURATION: 94 % | HEIGHT: 69 IN | SYSTOLIC BLOOD PRESSURE: 120 MMHG | DIASTOLIC BLOOD PRESSURE: 62 MMHG | WEIGHT: 218.3 LBS | HEART RATE: 63 BPM | BODY MASS INDEX: 32.33 KG/M2

## 2023-05-30 DIAGNOSIS — I10 PRIMARY HYPERTENSION: ICD-10-CM

## 2023-05-30 DIAGNOSIS — I48.0 PAF (PAROXYSMAL ATRIAL FIBRILLATION) (HCC): Primary | ICD-10-CM

## 2023-05-30 DIAGNOSIS — I77.810 AORTIC ROOT DILATION (HCC): ICD-10-CM

## 2023-05-30 PROCEDURE — 1123F ACP DISCUSS/DSCN MKR DOCD: CPT | Performed by: NURSE PRACTITIONER

## 2023-05-30 PROCEDURE — 3078F DIAST BP <80 MM HG: CPT | Performed by: NURSE PRACTITIONER

## 2023-05-30 PROCEDURE — 3074F SYST BP LT 130 MM HG: CPT | Performed by: NURSE PRACTITIONER

## 2023-05-30 PROCEDURE — 99214 OFFICE O/P EST MOD 30 MIN: CPT | Performed by: NURSE PRACTITIONER

## 2023-05-30 RX ORDER — M-VIT,TX,IRON,MINS/CALC/FOLIC 27MG-0.4MG
1 TABLET ORAL DAILY
COMMUNITY

## 2023-05-30 NOTE — PROGRESS NOTES
Trivial Pulmonic and Tricuspid Regurgitation. Event Monitor: 5/14 -6/12/19: AF RVR      avg HR 69 min 31 max 146      AF burden < 1%      Echo: Jun '22:   Summary   Moderate concentric left ventricular hypertrophy. Normal left ventricle size   and systolic function with an estimated ejection fraction of 65%. No   regional wall motion abnormalities are seen. Normal diastolic function for age. The aortic valve is sclerotic with normal excursion. Mild aortic   insufficiency. Mild tricuspid regurgitation with an estimated PASP of 20 mmHg. The aortic root is dilated measuring at 4.4 cm. The ascending aorta is   dilated measuring at 4.3 cm. Assessment:      Diagnosis Orders   1. PAF (paroxysmal atrial fibrillation) (HCC)   ~stable : denies feeling of heart flopping  ~tolerating diltiazem with Eliquis  ~AP regular  ~LA mod dilated on echo, 3.0 cm  ~CHADs 3  ~AF burden < 1% on Event monitor '19        2. primary hypertension   ~controlled on Lotensin     3. Aortic root dilation (HCC)   ~stable: BP controlled   ~dilated at 4.4 cm on echo from June '22; 4.3 cm on echo from July '20 compared to 4.2 cm in '19      I had the opportunity to review the clinical symptoms and presentation of Niharika العراقي. Plan:     1. Echocardiogram : surveillance for AoR  2. F/U in 1 yr    Overall the patient is stable from CV standpoint    I have addresed the patient's cardiac risk factors and adjusted pharmacologic treatment as needed. In addition, I have reinforced the need for patient directed risk factor modification. Further evaluation will be based upon the patient's clinical course and testing results. All questions and concerns were addressed to the patient. Alternatives to my treatment were discussed. The patient is not currently smoking. The risks related to smoking were reviewed with the patient.  Recommend maintaining a smoke-free lifestyle    Patient is not on a beta-blocker : neg MI  Patient is on an

## 2023-06-05 RX ORDER — BENAZEPRIL HYDROCHLORIDE 40 MG/1
TABLET, FILM COATED ORAL
Qty: 90 TABLET | Refills: 2 | Status: SHIPPED | OUTPATIENT
Start: 2023-06-05

## 2023-06-28 ENCOUNTER — TELEPHONE (OUTPATIENT)
Dept: CARDIOLOGY CLINIC | Age: 68
End: 2023-06-28

## 2023-06-28 ENCOUNTER — PROCEDURE VISIT (OUTPATIENT)
Dept: CARDIOLOGY CLINIC | Age: 68
End: 2023-06-28

## 2023-06-28 DIAGNOSIS — I48.0 PAF (PAROXYSMAL ATRIAL FIBRILLATION) (HCC): ICD-10-CM

## 2023-06-28 DIAGNOSIS — I77.810 AORTIC ROOT DILATION (HCC): ICD-10-CM

## 2023-06-28 LAB
LV EF: 58 %
LVEF MODALITY: NORMAL

## 2023-07-03 RX ORDER — DAPAGLIFLOZIN 10 MG/1
TABLET, FILM COATED ORAL
Qty: 90 TABLET | Refills: 1 | Status: SHIPPED | OUTPATIENT
Start: 2023-07-03

## 2023-08-28 RX ORDER — APIXABAN 5 MG/1
TABLET, FILM COATED ORAL
Qty: 180 TABLET | Refills: 1 | Status: SHIPPED | OUTPATIENT
Start: 2023-08-28

## 2023-09-13 ENCOUNTER — OFFICE VISIT (OUTPATIENT)
Dept: FAMILY MEDICINE CLINIC | Age: 68
End: 2023-09-13

## 2023-09-13 VITALS
SYSTOLIC BLOOD PRESSURE: 130 MMHG | DIASTOLIC BLOOD PRESSURE: 70 MMHG | RESPIRATION RATE: 16 BRPM | BODY MASS INDEX: 33.03 KG/M2 | HEIGHT: 69 IN | HEART RATE: 60 BPM | OXYGEN SATURATION: 96 % | WEIGHT: 223 LBS

## 2023-09-13 DIAGNOSIS — Z87.891 PERSONAL HISTORY OF TOBACCO USE: ICD-10-CM

## 2023-09-13 DIAGNOSIS — I48.0 PAF (PAROXYSMAL ATRIAL FIBRILLATION) (HCC): ICD-10-CM

## 2023-09-13 DIAGNOSIS — E11.9 CONTROLLED TYPE 2 DIABETES MELLITUS WITHOUT COMPLICATION, WITHOUT LONG-TERM CURRENT USE OF INSULIN (HCC): Primary | ICD-10-CM

## 2023-09-13 DIAGNOSIS — I10 ESSENTIAL HYPERTENSION: ICD-10-CM

## 2023-09-13 LAB
ALBUMIN SERPL-MCNC: 5 G/DL (ref 3.4–5)
ALBUMIN/GLOB SERPL: 2.3 {RATIO} (ref 1.1–2.2)
ALP SERPL-CCNC: 96 U/L (ref 40–129)
ALT SERPL-CCNC: 28 U/L (ref 10–40)
ANION GAP SERPL CALCULATED.3IONS-SCNC: 10 MMOL/L (ref 3–16)
AST SERPL-CCNC: 22 U/L (ref 15–37)
BILIRUB SERPL-MCNC: 0.4 MG/DL (ref 0–1)
BUN SERPL-MCNC: 23 MG/DL (ref 7–20)
CALCIUM SERPL-MCNC: 10.2 MG/DL (ref 8.3–10.6)
CHLORIDE SERPL-SCNC: 100 MMOL/L (ref 99–110)
CO2 SERPL-SCNC: 27 MMOL/L (ref 21–32)
CREAT SERPL-MCNC: 1 MG/DL (ref 0.8–1.3)
GFR SERPLBLD CREATININE-BSD FMLA CKD-EPI: >60 ML/MIN/{1.73_M2}
GLUCOSE SERPL-MCNC: 159 MG/DL (ref 70–99)
HBA1C MFR BLD: 7.2 %
HCT VFR BLD AUTO: 48.2 % (ref 40.5–52.5)
HGB BLD-MCNC: 16.8 G/DL (ref 13.5–17.5)
POTASSIUM SERPL-SCNC: 4.8 MMOL/L (ref 3.5–5.1)
PROT SERPL-MCNC: 7.2 G/DL (ref 6.4–8.2)
SODIUM SERPL-SCNC: 137 MMOL/L (ref 136–145)

## 2023-09-13 NOTE — PROGRESS NOTES
used smokeless tobacco.. Discussed with patient the risks and benefits of screening, including over-diagnosis, false positive rate, and total radiation exposure. The patient currently exhibits no signs or symptoms suggestive of lung cancer. Discussed with patient the importance of compliance with yearly annual lung cancer screenings and willingness to undergo diagnosis and treatment if screening scan is positive. In addition, the patient was counseled regarding the importance of remaining smoke free and/or total smoking cessation.     Also reviewed the following if the patient has Medicare that as of February 10, 2022, Medicare only covers LDCT screening in patients aged 53-69 with at least a 20 pack-year smoking history who currently smoke or have quit in the last 15 years

## 2023-09-18 ENCOUNTER — OFFICE VISIT (OUTPATIENT)
Dept: FAMILY MEDICINE CLINIC | Age: 68
End: 2023-09-18

## 2023-09-18 VITALS
OXYGEN SATURATION: 97 % | HEART RATE: 58 BPM | SYSTOLIC BLOOD PRESSURE: 126 MMHG | RESPIRATION RATE: 14 BRPM | BODY MASS INDEX: 32.93 KG/M2 | DIASTOLIC BLOOD PRESSURE: 74 MMHG | WEIGHT: 223 LBS | TEMPERATURE: 98 F

## 2023-09-18 DIAGNOSIS — I48.0 PAF (PAROXYSMAL ATRIAL FIBRILLATION) (HCC): ICD-10-CM

## 2023-09-18 DIAGNOSIS — K21.9 GASTROESOPHAGEAL REFLUX DISEASE, UNSPECIFIED WHETHER ESOPHAGITIS PRESENT: ICD-10-CM

## 2023-09-18 DIAGNOSIS — I10 ESSENTIAL HYPERTENSION: ICD-10-CM

## 2023-09-18 DIAGNOSIS — E11.69 DIABETES MELLITUS TYPE 2 IN OBESE (HCC): ICD-10-CM

## 2023-09-18 DIAGNOSIS — H26.9 CATARACT OF BOTH EYES, UNSPECIFIED CATARACT TYPE: ICD-10-CM

## 2023-09-18 DIAGNOSIS — E66.9 DIABETES MELLITUS TYPE 2 IN OBESE (HCC): ICD-10-CM

## 2023-09-18 DIAGNOSIS — Z01.818 PREOP EXAMINATION: Primary | ICD-10-CM

## 2023-09-21 DIAGNOSIS — E11.9 CONTROLLED TYPE 2 DIABETES MELLITUS WITHOUT COMPLICATION, WITHOUT LONG-TERM CURRENT USE OF INSULIN (HCC): ICD-10-CM

## 2023-09-21 RX ORDER — LANCETS 30 GAUGE
1 EACH MISCELLANEOUS 2 TIMES DAILY
Qty: 200 EACH | Refills: 3 | Status: SHIPPED | OUTPATIENT
Start: 2023-09-21

## 2023-10-04 ENCOUNTER — HOSPITAL ENCOUNTER (OUTPATIENT)
Dept: CT IMAGING | Age: 68
Discharge: HOME | End: 2023-10-04
Attending: INTERNAL MEDICINE
Payer: MEDICARE

## 2023-10-04 DIAGNOSIS — Z87.891 PERSONAL HISTORY OF TOBACCO USE: ICD-10-CM

## 2023-10-04 PROCEDURE — 71271 CT THORAX LUNG CANCER SCR C-: CPT

## 2023-10-23 RX ORDER — METFORMIN HYDROCHLORIDE 500 MG/1
TABLET, EXTENDED RELEASE ORAL
Qty: 270 TABLET | Refills: 1 | Status: SHIPPED | OUTPATIENT
Start: 2023-10-23

## 2023-11-27 NOTE — TELEPHONE ENCOUNTER
Received refill request for dilTIAZem (CARDIZEM CD) 240 MG from [facility] [address]     Last ov:06/28/2023 LES    Last labs:09/18/2023        Last Refill:3/8/2023    Next appointment:05/29/2024 NPST

## 2023-11-28 RX ORDER — DILTIAZEM HYDROCHLORIDE 240 MG/1
CAPSULE, COATED, EXTENDED RELEASE ORAL
Qty: 90 CAPSULE | Refills: 2 | Status: SHIPPED | OUTPATIENT
Start: 2023-11-28

## 2023-12-14 DIAGNOSIS — E78.5 DYSLIPIDEMIA: ICD-10-CM

## 2023-12-14 RX ORDER — DAPAGLIFLOZIN 10 MG/1
TABLET, FILM COATED ORAL
Qty: 90 TABLET | Refills: 1 | Status: SHIPPED | OUTPATIENT
Start: 2023-12-14

## 2023-12-14 RX ORDER — PRAVASTATIN SODIUM 20 MG
TABLET ORAL
Qty: 90 TABLET | Refills: 1 | Status: SHIPPED | OUTPATIENT
Start: 2023-12-14

## 2023-12-15 ENCOUNTER — TELEPHONE (OUTPATIENT)
Dept: FAMILY MEDICINE CLINIC | Age: 68
End: 2023-12-15

## 2023-12-15 ENCOUNTER — OFFICE VISIT (OUTPATIENT)
Dept: FAMILY MEDICINE CLINIC | Age: 68
End: 2023-12-15

## 2023-12-15 VITALS
DIASTOLIC BLOOD PRESSURE: 68 MMHG | SYSTOLIC BLOOD PRESSURE: 126 MMHG | HEART RATE: 64 BPM | BODY MASS INDEX: 34.07 KG/M2 | RESPIRATION RATE: 16 BRPM | HEIGHT: 69 IN | WEIGHT: 230 LBS | OXYGEN SATURATION: 95 %

## 2023-12-15 DIAGNOSIS — R63.5 WEIGHT GAIN: ICD-10-CM

## 2023-12-15 DIAGNOSIS — E11.9 CONTROLLED TYPE 2 DIABETES MELLITUS WITHOUT COMPLICATION, WITHOUT LONG-TERM CURRENT USE OF INSULIN (HCC): Primary | ICD-10-CM

## 2023-12-15 DIAGNOSIS — J06.9 VIRAL URI: ICD-10-CM

## 2023-12-15 LAB — HBA1C MFR BLD: 7.6 %

## 2023-12-15 RX ORDER — BENZONATATE 100 MG/1
100-200 CAPSULE ORAL 3 TIMES DAILY PRN
Qty: 40 CAPSULE | Refills: 0 | Status: SHIPPED | OUTPATIENT
Start: 2023-12-15

## 2024-01-26 ENCOUNTER — OFFICE VISIT (OUTPATIENT)
Dept: FAMILY MEDICINE CLINIC | Age: 69
End: 2024-01-26

## 2024-01-26 VITALS
SYSTOLIC BLOOD PRESSURE: 138 MMHG | RESPIRATION RATE: 16 BRPM | HEART RATE: 91 BPM | DIASTOLIC BLOOD PRESSURE: 74 MMHG | BODY MASS INDEX: 34.36 KG/M2 | HEIGHT: 69 IN | OXYGEN SATURATION: 93 % | WEIGHT: 232 LBS

## 2024-01-26 DIAGNOSIS — E66.9 DIABETES MELLITUS TYPE 2 IN OBESE (HCC): ICD-10-CM

## 2024-01-26 DIAGNOSIS — I48.0 PAF (PAROXYSMAL ATRIAL FIBRILLATION) (HCC): ICD-10-CM

## 2024-01-26 DIAGNOSIS — E11.69 DIABETES MELLITUS TYPE 2 IN OBESE (HCC): ICD-10-CM

## 2024-01-26 DIAGNOSIS — I48.91 ATRIAL FIBRILLATION WITH RVR (HCC): ICD-10-CM

## 2024-01-26 DIAGNOSIS — E11.9 CONTROLLED TYPE 2 DIABETES MELLITUS WITHOUT COMPLICATION, WITHOUT LONG-TERM CURRENT USE OF INSULIN (HCC): Primary | ICD-10-CM

## 2024-01-26 NOTE — PROGRESS NOTES
03/06/2023 9.4   03/06/2023 9.4       Cholesterol, Total (mg/dL)   Date Value   03/06/2023 115   02/17/2021 123   05/03/2018 144   05/03/2018 144     Cholesterol (mg/dL (calc))   Date Value   05/03/2018 98   05/03/2018 98     Triglycerides (mg/dL)   Date Value   03/06/2023 99   03/06/2023 99   02/17/2021 94     HDL   Date Value   03/06/2023 42 mg/dL   03/06/2023 42 mg/dL   02/17/2021 42 mg/dL   08/19/2010 40 mg/dl     LDL Calculated   Date Value   03/06/2023 55 mg/dL (calc)   03/06/2023 55 mg/dL   02/17/2021 63 mg/dL       ALT (U/L)   Date Value   09/13/2023 28   03/06/2023 20   03/06/2023 20     AST (U/L)   Date Value   09/13/2023 22   03/06/2023 17   03/06/2023 17       TSH (uIU/mL)   Date Value   04/16/2019 0.71   10/28/2013 1.19     T4 Free (ng/dL)   Date Value   04/16/2019 1.3   10/28/2013 1.1       WBC   Date Value   03/06/2023 5.7 Thousand/uL   03/06/2023 5.7 10^3/mL   02/24/2021 6.0 K/uL     Hemoglobin (g/dL)   Date Value   09/13/2023 16.8   03/06/2023 16.3   03/06/2023 16.3     Hematocrit (%)   Date Value   09/13/2023 48.2   03/06/2023 47.2   08/16/2022 46.7     MCV (fL)   Date Value   03/06/2023 95.0   02/24/2021 96.7   04/17/2019 98.3     Platelets   Date Value   03/06/2023 209 Thousand/uL   03/06/2023 209 K/µL   02/24/2021 216 K/uL       PSA   Date Value   03/06/2023 1.28 ng/mL   03/06/2023 1.28 ng/mL   08/13/2018 1.0   10/28/2013 0.77 ng/mL        Uric Acid, Serum (mg/dL)   Date Value   09/24/2021 5.2        Vitals:    01/26/24 0859   BP: 138/74   Pulse: 91   Resp: 16   SpO2: 93%       BP Readings from Last 3 Encounters:   01/26/24 138/74   12/15/23 126/68   09/18/23 126/74        Wt Readings from Last 3 Encounters:   01/26/24 105.2 kg (232 lb)   12/15/23 104.3 kg (230 lb)   09/18/23 101.2 kg (223 lb)        Review of Systems       Objective   Physical Exam  Constitutional:       Appearance: Normal appearance.   HENT:      Head: Normocephalic and atraumatic.   Cardiovascular:      Rate and Rhythm:

## 2024-02-16 NOTE — TELEPHONE ENCOUNTER
Received refill request for benazepril and ELIQUIS 5 mg from [facility] pharmacy.    Last ov:06/28/2023  LES    Last labs:09/13/2023 CMP 03/06/2023 CBC    Last Refill: benazepril 06/05/2023 TAKE 1 TABLET DAILY Dispense: 90 tablet, Refills: 2 ordered  ELIQUIS 5 mg 08/28/2023  TAKE 1 TABLET TWICE A DAY Dispense: 180 tablet, Refills: 1 ordered         Next appointment:05/19/2024 NPTS

## 2024-02-17 RX ORDER — BENAZEPRIL HYDROCHLORIDE 40 MG/1
40 TABLET ORAL DAILY
Qty: 90 TABLET | Refills: 0 | Status: SHIPPED | OUTPATIENT
Start: 2024-02-17

## 2024-04-02 SDOH — ECONOMIC STABILITY: FOOD INSECURITY: WITHIN THE PAST 12 MONTHS, YOU WORRIED THAT YOUR FOOD WOULD RUN OUT BEFORE YOU GOT MONEY TO BUY MORE.: NEVER TRUE

## 2024-04-02 SDOH — ECONOMIC STABILITY: FOOD INSECURITY: WITHIN THE PAST 12 MONTHS, THE FOOD YOU BOUGHT JUST DIDN'T LAST AND YOU DIDN'T HAVE MONEY TO GET MORE.: NEVER TRUE

## 2024-04-02 SDOH — ECONOMIC STABILITY: INCOME INSECURITY: HOW HARD IS IT FOR YOU TO PAY FOR THE VERY BASICS LIKE FOOD, HOUSING, MEDICAL CARE, AND HEATING?: NOT HARD AT ALL

## 2024-04-02 SDOH — ECONOMIC STABILITY: TRANSPORTATION INSECURITY
IN THE PAST 12 MONTHS, HAS LACK OF TRANSPORTATION KEPT YOU FROM MEETINGS, WORK, OR FROM GETTING THINGS NEEDED FOR DAILY LIVING?: NO

## 2024-04-03 ENCOUNTER — OFFICE VISIT (OUTPATIENT)
Dept: FAMILY MEDICINE CLINIC | Age: 69
End: 2024-04-03

## 2024-04-03 ENCOUNTER — HOSPITAL ENCOUNTER (OUTPATIENT)
Age: 69
Discharge: HOME | End: 2024-04-03
Payer: MEDICARE

## 2024-04-03 ENCOUNTER — HOSPITAL ENCOUNTER (OUTPATIENT)
Dept: GENERAL RADIOLOGY | Age: 69
Discharge: HOME | End: 2024-04-03
Attending: INTERNAL MEDICINE
Payer: MEDICARE

## 2024-04-03 VITALS
RESPIRATION RATE: 16 BRPM | SYSTOLIC BLOOD PRESSURE: 128 MMHG | OXYGEN SATURATION: 94 % | HEART RATE: 54 BPM | WEIGHT: 230 LBS | BODY MASS INDEX: 33.97 KG/M2 | DIASTOLIC BLOOD PRESSURE: 70 MMHG

## 2024-04-03 DIAGNOSIS — R07.81 RIB PAIN: ICD-10-CM

## 2024-04-03 DIAGNOSIS — Z13.220 SCREENING FOR LIPID DISORDERS: ICD-10-CM

## 2024-04-03 DIAGNOSIS — Z12.5 SCREENING FOR PROSTATE CANCER: ICD-10-CM

## 2024-04-03 DIAGNOSIS — I10 ESSENTIAL HYPERTENSION: ICD-10-CM

## 2024-04-03 DIAGNOSIS — E11.9 CONTROLLED TYPE 2 DIABETES MELLITUS WITHOUT COMPLICATION, WITHOUT LONG-TERM CURRENT USE OF INSULIN (HCC): Primary | ICD-10-CM

## 2024-04-03 LAB — HBA1C MFR BLD: 7.1 %

## 2024-04-03 PROCEDURE — 71101 X-RAY EXAM UNILAT RIBS/CHEST: CPT

## 2024-04-03 RX ORDER — SEMAGLUTIDE 1.34 MG/ML
INJECTION, SOLUTION SUBCUTANEOUS
Qty: 3 ML | Refills: 0 | Status: SHIPPED | OUTPATIENT
Start: 2024-04-03

## 2024-04-03 NOTE — PROGRESS NOTES
Rafael Perez (:  1955) is a 68 y.o. male, here for evaluation of the following chief complaint(s):  Diabetes (Patient is here for a DM follw up )           Assessment/Plan  Rafael was seen today for diabetes.    Diagnoses and all orders for this visit:    Controlled type 2 diabetes mellitus without complication, without long-term current use of insulin (HCC)  -     MICROALBUMIN / CREATININE URINE RATIO; Future  -     POCT glycosylated hemoglobin (Hb A1C)  -     Lipid Panel; Future    Rib pain  -     XR RIBS LEFT INCLUDE CHEST (MIN 3 VIEWS); Future    Essential hypertension  -     CBC with Auto Differential; Future  -     Comprehensive Metabolic Panel; Future    Screening for prostate cancer  -     PSA Screening; Future    Screening for lipid disorders  -     Lipid Panel; Future    Other orders  -     Semaglutide, 1 MG/DOSE, (OZEMPIC, 1 MG/DOSE,) 4 MG/3ML SOPN; Take 1mg /week         Orders Placed This Encounter   Medications    Semaglutide, 1 MG/DOSE, (OZEMPIC, 1 MG/DOSE,) 4 MG/3ML SOPN     Sig: Take 1mg /week     Dispense:  3 mL     Refill:  0    Recommend lidocaine patch  Quit taking tylenol , does not help      Return for SCHEDULE MEDICARE AWE.       Patient Instructions   Follow up about  2 month after taking the 1 mg ozempic for a month    Sched awe    Subjective   SUBJECTIVE/OBJECTIVE:  Diabetes      Up to .5  on ozempic  Does not snack like he used to  3 meals  No nausea  Lost 2 lbs  Metformin 1am , 2 pm  Not on farxiga - no side effects  has 3 month supple  10 mg          Using a gel diclofenac?  Working with a podiatrist  with his foot   Wants to walk  Friday going to ortho for neck  For 3 months  left back ribs hurt  Not getting better    Hemoglobin A1C (%)   Date Value   2024 7.1   12/15/2023 7.6   2023 7.2       Sodium (mmol/L)   Date Value   2023 137   2023 139   2023 139     Potassium (mmol/L)   Date Value   2023 4.8   2023 4.4   2023 4.4

## 2024-04-10 DIAGNOSIS — E11.9 CONTROLLED TYPE 2 DIABETES MELLITUS WITHOUT COMPLICATION, WITHOUT LONG-TERM CURRENT USE OF INSULIN (HCC): ICD-10-CM

## 2024-04-10 DIAGNOSIS — I10 ESSENTIAL HYPERTENSION: ICD-10-CM

## 2024-04-10 DIAGNOSIS — Z12.5 SCREENING FOR PROSTATE CANCER: ICD-10-CM

## 2024-04-10 DIAGNOSIS — Z13.220 SCREENING FOR LIPID DISORDERS: ICD-10-CM

## 2024-04-10 LAB
ALBUMIN SERPL-MCNC: 4.5 G/DL (ref 3.4–5)
ALBUMIN/GLOB SERPL: 2.4 {RATIO} (ref 1.1–2.2)
ALP SERPL-CCNC: 98 U/L (ref 40–129)
ALT SERPL-CCNC: 30 U/L (ref 10–40)
ANION GAP SERPL CALCULATED.3IONS-SCNC: 12 MMOL/L (ref 3–16)
AST SERPL-CCNC: 22 U/L (ref 15–37)
BASOPHILS # BLD: 0 K/UL (ref 0–0.2)
BASOPHILS NFR BLD: 0.7 %
BILIRUB SERPL-MCNC: 0.3 MG/DL (ref 0–1)
BUN SERPL-MCNC: 21 MG/DL (ref 7–20)
CALCIUM SERPL-MCNC: 9.4 MG/DL (ref 8.3–10.6)
CHLORIDE SERPL-SCNC: 104 MMOL/L (ref 99–110)
CHOLEST SERPL-MCNC: 124 MG/DL (ref 0–199)
CO2 SERPL-SCNC: 25 MMOL/L (ref 21–32)
CREAT SERPL-MCNC: 1.1 MG/DL (ref 0.8–1.3)
CREAT UR-MCNC: 180.4 MG/DL (ref 39–259)
DEPRECATED RDW RBC AUTO: 13.6 % (ref 12.4–15.4)
EOSINOPHIL # BLD: 0.1 K/UL (ref 0–0.6)
EOSINOPHIL NFR BLD: 2.5 %
GFR SERPLBLD CREATININE-BSD FMLA CKD-EPI: 73 ML/MIN/{1.73_M2}
GLUCOSE SERPL-MCNC: 127 MG/DL (ref 70–99)
HCT VFR BLD AUTO: 45.3 % (ref 40.5–52.5)
HDLC SERPL-MCNC: 42 MG/DL (ref 40–60)
HGB BLD-MCNC: 15.2 G/DL (ref 13.5–17.5)
LDLC SERPL CALC-MCNC: 45 MG/DL
LYMPHOCYTES # BLD: 2 K/UL (ref 1–5.1)
LYMPHOCYTES NFR BLD: 35 %
MCH RBC QN AUTO: 33.1 PG (ref 26–34)
MCHC RBC AUTO-ENTMCNC: 33.6 G/DL (ref 31–36)
MCV RBC AUTO: 98.4 FL (ref 80–100)
MICROALBUMIN UR DL<=1MG/L-MCNC: 1.7 MG/DL
MICROALBUMIN/CREAT UR: 9.4 MG/G (ref 0–30)
MONOCYTES # BLD: 0.5 K/UL (ref 0–1.3)
MONOCYTES NFR BLD: 8 %
NEUTROPHILS # BLD: 3.1 K/UL (ref 1.7–7.7)
NEUTROPHILS NFR BLD: 53.8 %
PLATELET # BLD AUTO: 215 K/UL (ref 135–450)
PMV BLD AUTO: 9.1 FL (ref 5–10.5)
POTASSIUM SERPL-SCNC: 4.7 MMOL/L (ref 3.5–5.1)
PROT SERPL-MCNC: 6.4 G/DL (ref 6.4–8.2)
PSA SERPL DL<=0.01 NG/ML-MCNC: 2.45 NG/ML (ref 0–4)
RBC # BLD AUTO: 4.6 M/UL (ref 4.2–5.9)
SODIUM SERPL-SCNC: 141 MMOL/L (ref 136–145)
TRIGL SERPL-MCNC: 187 MG/DL (ref 0–150)
VLDLC SERPL CALC-MCNC: 37 MG/DL
WBC # BLD AUTO: 5.7 K/UL (ref 4–11)

## 2024-04-11 RX ORDER — METFORMIN HYDROCHLORIDE 500 MG/1
TABLET, EXTENDED RELEASE ORAL
Qty: 270 TABLET | Refills: 1 | Status: SHIPPED | OUTPATIENT
Start: 2024-04-11

## 2024-04-26 RX ORDER — SEMAGLUTIDE 1.34 MG/ML
INJECTION, SOLUTION SUBCUTANEOUS
Qty: 3 ML | Refills: 0 | Status: SHIPPED | OUTPATIENT
Start: 2024-04-26

## 2024-04-26 NOTE — TELEPHONE ENCOUNTER
Spoke to the patient, he is doing good on the med. He would like the refill to be sent to [facility] mail

## 2024-04-26 NOTE — TELEPHONE ENCOUNTER
Requested Prescriptions     Pending Prescriptions Disp Refills    benazepril (LOTENSIN) 40 MG tablet [Pharmacy Med Name: BENAZEPRIL TAB 40MG] 90 tablet 0     Sig: TAKE 1 TABLET DAILY      [facility]       Last OV:  5/30/2023 NPTS    Next OV: 5/29/2024 NPTS    Last Labs: 04/10/2024 CMP    Last Filled: 02/17/2024 NPTS

## 2024-04-29 RX ORDER — BENAZEPRIL HYDROCHLORIDE 40 MG/1
40 TABLET ORAL DAILY
Qty: 90 TABLET | Refills: 0 | Status: SHIPPED | OUTPATIENT
Start: 2024-04-29

## 2024-05-06 RX ORDER — BENAZEPRIL HYDROCHLORIDE 40 MG/1
40 TABLET ORAL DAILY
Qty: 90 TABLET | Refills: 0 | OUTPATIENT
Start: 2024-05-06

## 2024-05-20 RX ORDER — SEMAGLUTIDE 1.34 MG/ML
INJECTION, SOLUTION SUBCUTANEOUS
Qty: 3 ML | Refills: 0 | Status: SHIPPED | OUTPATIENT
Start: 2024-05-20

## 2024-05-29 ENCOUNTER — OFFICE VISIT (OUTPATIENT)
Dept: CARDIOLOGY CLINIC | Age: 69
End: 2024-05-29
Payer: MEDICARE

## 2024-05-29 VITALS
OXYGEN SATURATION: 98 % | HEIGHT: 69 IN | HEART RATE: 60 BPM | BODY MASS INDEX: 32.73 KG/M2 | WEIGHT: 221 LBS | SYSTOLIC BLOOD PRESSURE: 130 MMHG | DIASTOLIC BLOOD PRESSURE: 70 MMHG

## 2024-05-29 DIAGNOSIS — I48.0 PAF (PAROXYSMAL ATRIAL FIBRILLATION) (HCC): Primary | ICD-10-CM

## 2024-05-29 DIAGNOSIS — I10 PRIMARY HYPERTENSION: ICD-10-CM

## 2024-05-29 DIAGNOSIS — I77.810 AORTIC ROOT DILATION (HCC): ICD-10-CM

## 2024-05-29 PROCEDURE — 93000 ELECTROCARDIOGRAM COMPLETE: CPT | Performed by: NURSE PRACTITIONER

## 2024-05-29 PROCEDURE — 99214 OFFICE O/P EST MOD 30 MIN: CPT | Performed by: NURSE PRACTITIONER

## 2024-05-29 PROCEDURE — 1123F ACP DISCUSS/DSCN MKR DOCD: CPT | Performed by: NURSE PRACTITIONER

## 2024-05-29 PROCEDURE — 3074F SYST BP LT 130 MM HG: CPT | Performed by: NURSE PRACTITIONER

## 2024-05-29 PROCEDURE — 3078F DIAST BP <80 MM HG: CPT | Performed by: NURSE PRACTITIONER

## 2024-05-29 NOTE — PROGRESS NOTES
6/21/2023.      Assessment:      Diagnosis Orders   1. PAF (paroxysmal atrial fibrillation) (HCC)   ~stable : denies palpitations / fluttering-flopping  ~tolerating diltiazem with Eliquis  ~AP regular  ~LA mod dilated on echo, 3.5 cm  ~CHADs 3  ~AF burden < 1% on Event monitor '19        2. primary hypertension   ~controlled on lotensin  ~normal LV wall size / function     3. Aortic root dilation (HCC)   ~stable at 4.2 cm with echo Jun '23  ~dilated root at 4.4 cm on echo from June '22; ascending aorta 4.3 cm  ~ 4.3 cm on echo from July '20  ~4.2 cm in '19      I had the opportunity to review the clinical symptoms and presentation of Rafael Perez.   Plan:     1. EKG: sinus rhythm  2. F/U in 1 yr   ~consider surveillance echo at that time    Overall the patient is stable from CV standpoint    I have addresed the patient's cardiac risk factors and adjusted pharmacologic treatment as needed. In addition, I have reinforced the need for patient directed risk factor modification.    Further evaluation will be based upon the patient's clinical course and testing results.    All questions and concerns were addressed to the patient. Alternatives to my treatment were discussed.      The patient is not currently smoking. The risks related to smoking were reviewed with the patient. Recommend maintaining a smoke-free lifestyle    Patient is not on a beta-blocker : neg MI  Patient is on an ace-i  Patient is on a statin     anti-coagulation has been recommended / prescribed for this patient. Education conducted on adverse reactions including bleeding was discussed.    The patient verbalizes understanding not to stop medications without discussing with us.    Discussed exercise: 30-60 minutes 7 days/week ; yard work this time of year is the extent of his exercise  Discussed diet. SMBG 123 yesterday before lunch. It had been 133 or so prior to taking Ozempic    Thank you for allowing to us to participate in the care of Rafael FERNANDEZ

## 2024-05-31 SDOH — HEALTH STABILITY: PHYSICAL HEALTH: ON AVERAGE, HOW MANY DAYS PER WEEK DO YOU ENGAGE IN MODERATE TO STRENUOUS EXERCISE (LIKE A BRISK WALK)?: 0 DAYS

## 2024-05-31 ASSESSMENT — LIFESTYLE VARIABLES
HOW OFTEN DURING THE LAST YEAR HAVE YOU BEEN UNABLE TO REMEMBER WHAT HAPPENED THE NIGHT BEFORE BECAUSE YOU HAD BEEN DRINKING: NEVER
HOW OFTEN DURING THE LAST YEAR HAVE YOU FAILED TO DO WHAT WAS NORMALLY EXPECTED FROM YOU BECAUSE OF DRINKING: NEVER
HAVE YOU OR SOMEONE ELSE BEEN INJURED AS A RESULT OF YOUR DRINKING: NO
HOW OFTEN DURING THE LAST YEAR HAVE YOU BEEN UNABLE TO REMEMBER WHAT HAPPENED THE NIGHT BEFORE BECAUSE YOU HAD BEEN DRINKING: NEVER
HOW OFTEN DURING THE LAST YEAR HAVE YOU FOUND THAT YOU WERE NOT ABLE TO STOP DRINKING ONCE YOU HAD STARTED: NEVER
HAS A RELATIVE, FRIEND, DOCTOR, OR ANOTHER HEALTH PROFESSIONAL EXPRESSED CONCERN ABOUT YOUR DRINKING OR SUGGESTED YOU CUT DOWN: NO
HOW OFTEN DURING THE LAST YEAR HAVE YOU NEEDED AN ALCOHOLIC DRINK FIRST THING IN THE MORNING TO GET YOURSELF GOING AFTER A NIGHT OF HEAVY DRINKING: NEVER
HAVE YOU OR SOMEONE ELSE BEEN INJURED AS A RESULT OF YOUR DRINKING: NO
HOW OFTEN DO YOU HAVE A DRINK CONTAINING ALCOHOL: 4 OR MORE TIMES A WEEK
HOW OFTEN DURING THE LAST YEAR HAVE YOU FAILED TO DO WHAT WAS NORMALLY EXPECTED FROM YOU BECAUSE OF DRINKING: NEVER
HOW OFTEN DURING THE LAST YEAR HAVE YOU HAD A FEELING OF GUILT OR REMORSE AFTER DRINKING: NEVER
HOW MANY STANDARD DRINKS CONTAINING ALCOHOL DO YOU HAVE ON A TYPICAL DAY: 2
HOW MANY STANDARD DRINKS CONTAINING ALCOHOL DO YOU HAVE ON A TYPICAL DAY: 3 OR 4
HOW OFTEN DURING THE LAST YEAR HAVE YOU HAD A FEELING OF GUILT OR REMORSE AFTER DRINKING: NEVER
HOW OFTEN DURING THE LAST YEAR HAVE YOU FOUND THAT YOU WERE NOT ABLE TO STOP DRINKING ONCE YOU HAD STARTED: NEVER
HOW OFTEN DO YOU HAVE SIX OR MORE DRINKS ON ONE OCCASION: 4
HOW OFTEN DO YOU HAVE A DRINK CONTAINING ALCOHOL: 5
HOW OFTEN DURING THE LAST YEAR HAVE YOU NEEDED AN ALCOHOLIC DRINK FIRST THING IN THE MORNING TO GET YOURSELF GOING AFTER A NIGHT OF HEAVY DRINKING: NEVER
HAS A RELATIVE, FRIEND, DOCTOR, OR ANOTHER HEALTH PROFESSIONAL EXPRESSED CONCERN ABOUT YOUR DRINKING OR SUGGESTED YOU CUT DOWN: NO

## 2024-05-31 ASSESSMENT — PATIENT HEALTH QUESTIONNAIRE - PHQ9
SUM OF ALL RESPONSES TO PHQ QUESTIONS 1-9: 0
SUM OF ALL RESPONSES TO PHQ QUESTIONS 1-9: 0
1. LITTLE INTEREST OR PLEASURE IN DOING THINGS: NOT AT ALL
SUM OF ALL RESPONSES TO PHQ9 QUESTIONS 1 & 2: 0
2. FEELING DOWN, DEPRESSED OR HOPELESS: NOT AT ALL
SUM OF ALL RESPONSES TO PHQ QUESTIONS 1-9: 0
SUM OF ALL RESPONSES TO PHQ QUESTIONS 1-9: 0

## 2024-06-03 ENCOUNTER — OFFICE VISIT (OUTPATIENT)
Dept: FAMILY MEDICINE CLINIC | Age: 69
End: 2024-06-03
Payer: MEDICARE

## 2024-06-03 VITALS
DIASTOLIC BLOOD PRESSURE: 68 MMHG | RESPIRATION RATE: 16 BRPM | SYSTOLIC BLOOD PRESSURE: 132 MMHG | WEIGHT: 220 LBS | HEART RATE: 64 BPM | HEIGHT: 69 IN | OXYGEN SATURATION: 96 % | BODY MASS INDEX: 32.58 KG/M2

## 2024-06-03 DIAGNOSIS — I10 ESSENTIAL HYPERTENSION: ICD-10-CM

## 2024-06-03 DIAGNOSIS — E78.5 DYSLIPIDEMIA: ICD-10-CM

## 2024-06-03 DIAGNOSIS — Z00.00 INITIAL MEDICARE ANNUAL WELLNESS VISIT: Primary | ICD-10-CM

## 2024-06-03 DIAGNOSIS — Z78.9 ALCOHOL USE: ICD-10-CM

## 2024-06-03 DIAGNOSIS — R97.20 INCREASED PROSTATE SPECIFIC ANTIGEN (PSA) VELOCITY: ICD-10-CM

## 2024-06-03 DIAGNOSIS — E11.9 CONTROLLED TYPE 2 DIABETES MELLITUS WITHOUT COMPLICATION, WITHOUT LONG-TERM CURRENT USE OF INSULIN (HCC): ICD-10-CM

## 2024-06-03 DIAGNOSIS — Z00.00 ENCOUNTER FOR ANNUAL WELLNESS EXAM IN MEDICARE PATIENT: ICD-10-CM

## 2024-06-03 PROCEDURE — 3051F HG A1C>EQUAL 7.0%<8.0%: CPT | Performed by: INTERNAL MEDICINE

## 2024-06-03 PROCEDURE — G0438 PPPS, INITIAL VISIT: HCPCS | Performed by: INTERNAL MEDICINE

## 2024-06-03 PROCEDURE — 99214 OFFICE O/P EST MOD 30 MIN: CPT | Performed by: INTERNAL MEDICINE

## 2024-06-03 PROCEDURE — 1123F ACP DISCUSS/DSCN MKR DOCD: CPT | Performed by: INTERNAL MEDICINE

## 2024-06-03 PROCEDURE — 3078F DIAST BP <80 MM HG: CPT | Performed by: INTERNAL MEDICINE

## 2024-06-03 PROCEDURE — 3075F SYST BP GE 130 - 139MM HG: CPT | Performed by: INTERNAL MEDICINE

## 2024-06-03 NOTE — PROGRESS NOTES
Medicare Annual Wellness Visit    Rafael Perez is here for Medicare AWV (Patient is here for an AWV)    Assessment & Plan   Encounter for annual wellness exam in Medicare patient  Controlled type 2 diabetes mellitus without complication, without long-term current use of insulin (HCC)  Alcohol use  Dyslipidemia declines medication  8/18  Increased prostate specific antigen (PSA) velocity  -     PSA, Prostatic Specific Antigen ([facility] [facility]); Future  Essential hypertension  benazepril   Continue ozempic , metformin -he can cut down to one bid  Repeat psa in oct  No change bp meds  Did not want to discuss alcohol very much  Used foul language when I discussed this.    Recommendations for Preventive Services Due: see orders and patient instructions/AVS.  Recommended screening schedule for the next 5-10 years is provided to the patient in written form: see Patient Instructions/AVS.     No follow-ups on file.     Subjective   DM  On ozempic  Seems to be tolerating ok  No nausea or vomiting  Still on metformin  1500mg pushing it for his tolerance  Can get diarrhea  No low glucose  No pravastatin for cholesterol      Psa went up but is in range  Lab Results   Component Value Date    PSA 2.45 04/10/2024    PSA 1.28 03/06/2023    PSA 1.28 03/06/2023             Lab Results   Component Value Date/Time    LABA1C 7.1 04/03/2024 09:16 AM    LABA1C 7.6 12/15/2023 09:11 AM    LABA1C 7.2 09/13/2023 08:18 AM   Htn   On diltiazem and lotensin for bp  Not cking at home      Patient's complete Health Risk Assessment and screening values have been reviewed and are found in Flowsheets. The following problems were reviewed today and where indicated follow up appointments were made and/or referrals ordered.    Positive Risk Factor Screenings with Interventions:         Alcohol Screening:  Alcohol Use: Heavy Drinker (5/31/2024)    AUDIT-C     Frequency of Alcohol Consumption: 4 or more times a week     Average Number of Drinks: 3 or 4

## 2024-06-13 ENCOUNTER — TELEPHONE (OUTPATIENT)
Dept: CARDIOLOGY CLINIC | Age: 69
End: 2024-06-13

## 2024-06-13 RX ORDER — SEMAGLUTIDE 1.34 MG/ML
INJECTION, SOLUTION SUBCUTANEOUS
Qty: 3 ML | Refills: 0 | Status: SHIPPED | OUTPATIENT
Start: 2024-06-13

## 2024-06-13 NOTE — TELEPHONE ENCOUNTER
Morelia from [facility] called to speak w/lakshmi Buchanan  Once I came back to my desk the phone was disconnected.  lakshmi Buchanan please call Morelia at:  [phone].  Please advise.  Thank you

## 2024-06-13 NOTE — TELEPHONE ENCOUNTER
Spoke to the patient, he has a couple weeks left of the current dose he is on for the Ozempic 1mg.   Patient would like a 1 month refill for Ozempic 1mg to last until he comes in to see Dr. Sauer in July.     Patients last OV was 6/3/24 with Dr. Sauer.

## 2024-06-13 NOTE — TELEPHONE ENCOUNTER
Spoke with Kinga for [facility]. NPTS is needing clarification what procedure the pt is having done for his cardiac clearance.

## 2024-06-13 NOTE — TELEPHONE ENCOUNTER
Pls see my message  I like to see monthly and adjust dose up as needed.  If he needs to see me now I can do a VV    I don't like to give 3 month of the same dose if we are adjusting

## 2024-07-02 DIAGNOSIS — R97.20 INCREASED PROSTATE SPECIFIC ANTIGEN (PSA) VELOCITY: ICD-10-CM

## 2024-07-03 LAB — PSA SERPL DL<=0.01 NG/ML-MCNC: 1.86 NG/ML (ref 0–4)

## 2024-07-08 RX ORDER — SEMAGLUTIDE 1.34 MG/ML
INJECTION, SOLUTION SUBCUTANEOUS
Qty: 3 ML | Refills: 0 | OUTPATIENT
Start: 2024-07-08

## 2024-07-08 NOTE — TELEPHONE ENCOUNTER
Need to discuss dose of ozempic at appt  I don't want to refill until his appt  Can move appt up if needed

## 2024-07-08 NOTE — TELEPHONE ENCOUNTER
Last ov:24 NPTS  Next ov: recall list 3/30/25 NPTS  Last EK24  Last labs:4/10/24  Last filled:   Disp Refills Start End    dilTIAZem (CARDIZEM CD) 240 MG extended release capsule 90 capsule 2 2023 --    Sig: TAKE 1 CAPSULE DAILY.    Sent to pharmacy as: dilTIAZem HCl ER Coated Beads 240 MG Oral Capsule Extended Release 24 Hour (CARDIZEM CD)    E-Prescribing Status: Receipt confirmed by pharmacy (2023  7:39 AM EST)

## 2024-07-10 RX ORDER — BENAZEPRIL HYDROCHLORIDE 40 MG/1
40 TABLET ORAL DAILY
Qty: 90 TABLET | Refills: 3 | Status: SHIPPED | OUTPATIENT
Start: 2024-07-10

## 2024-07-10 RX ORDER — DILTIAZEM HYDROCHLORIDE 240 MG/1
CAPSULE, COATED, EXTENDED RELEASE ORAL
Qty: 90 CAPSULE | Refills: 2 | Status: SHIPPED | OUTPATIENT
Start: 2024-07-10

## 2024-07-10 NOTE — TELEPHONE ENCOUNTER
Received refill request for benazepril from [facility] pharmacy.    Last ov:2024 NPTS    Last labs:04/10/2024    Last EK2024    Last Refill:2024    Next appointment:2025 NPTS Recall List

## 2024-07-16 ENCOUNTER — OFFICE VISIT (OUTPATIENT)
Dept: FAMILY MEDICINE CLINIC | Age: 69
End: 2024-07-16
Payer: MEDICARE

## 2024-07-16 VITALS
WEIGHT: 219 LBS | OXYGEN SATURATION: 95 % | RESPIRATION RATE: 16 BRPM | BODY MASS INDEX: 32.34 KG/M2 | HEART RATE: 59 BPM | SYSTOLIC BLOOD PRESSURE: 128 MMHG | DIASTOLIC BLOOD PRESSURE: 60 MMHG

## 2024-07-16 DIAGNOSIS — I10 ESSENTIAL HYPERTENSION: ICD-10-CM

## 2024-07-16 DIAGNOSIS — I48.91 ATRIAL FIBRILLATION WITH RVR (HCC): ICD-10-CM

## 2024-07-16 DIAGNOSIS — E78.5 DYSLIPIDEMIA: ICD-10-CM

## 2024-07-16 DIAGNOSIS — E11.9 CONTROLLED TYPE 2 DIABETES MELLITUS WITHOUT COMPLICATION, WITHOUT LONG-TERM CURRENT USE OF INSULIN (HCC): Primary | ICD-10-CM

## 2024-07-16 DIAGNOSIS — I48.0 PAF (PAROXYSMAL ATRIAL FIBRILLATION) (HCC): ICD-10-CM

## 2024-07-16 LAB — HBA1C MFR BLD: 6.7 %

## 2024-07-16 PROCEDURE — 99214 OFFICE O/P EST MOD 30 MIN: CPT | Performed by: INTERNAL MEDICINE

## 2024-07-16 PROCEDURE — 83036 HEMOGLOBIN GLYCOSYLATED A1C: CPT | Performed by: INTERNAL MEDICINE

## 2024-07-16 PROCEDURE — 3074F SYST BP LT 130 MM HG: CPT | Performed by: INTERNAL MEDICINE

## 2024-07-16 PROCEDURE — 1123F ACP DISCUSS/DSCN MKR DOCD: CPT | Performed by: INTERNAL MEDICINE

## 2024-07-16 PROCEDURE — 3044F HG A1C LEVEL LT 7.0%: CPT | Performed by: INTERNAL MEDICINE

## 2024-07-16 PROCEDURE — G2211 COMPLEX E/M VISIT ADD ON: HCPCS | Performed by: INTERNAL MEDICINE

## 2024-07-16 PROCEDURE — 3078F DIAST BP <80 MM HG: CPT | Performed by: INTERNAL MEDICINE

## 2024-07-16 RX ORDER — SEMAGLUTIDE 2.68 MG/ML
2 INJECTION, SOLUTION SUBCUTANEOUS
Qty: 3 ML | Refills: 0 | Status: SHIPPED | OUTPATIENT
Start: 2024-07-16

## 2024-07-16 RX ORDER — SEMAGLUTIDE 1.34 MG/ML
INJECTION, SOLUTION SUBCUTANEOUS
Qty: 3 ML | Refills: 0 | Status: CANCELLED | OUTPATIENT
Start: 2024-07-16

## 2024-07-16 RX ORDER — PRAVASTATIN SODIUM 20 MG
TABLET ORAL
Qty: 90 TABLET | Refills: 1 | Status: SHIPPED | OUTPATIENT
Start: 2024-07-16

## 2024-07-16 NOTE — PROGRESS NOTES
Rafael Perez (:  1955) is a 68 y.o. male, here for evaluation of the following chief complaint(s):  Diabetes (Patient is here for a DM follow up )      Assessment & Plan     Assessment/Plan  Rafael was seen today for diabetes.    Diagnoses and all orders for this visit:    Controlled type 2 diabetes mellitus without complication, without long-term current use of insulin (HCC)  -     POCT glycosylated hemoglobin (Hb A1C)    Essential hypertension  benazepril     PAF (paroxysmal atrial fibrillation) ([facility])    Atrial fibrillation with RVR (HCC) sees  Ligia Berman at [facility]    Other orders  -     semaglutide, 2 MG/DOSE, (OZEMPIC, 2 MG/DOSE,) 8 MG/3ML SOPN sc injection; Inject 2 mg into the skin every 7 days         Orders Placed This Encounter   Medications    semaglutide, 2 MG/DOSE, (OZEMPIC, 2 MG/DOSE,) 8 MG/3ML SOPN sc injection     Sig: Inject 2 mg into the skin every 7 days     Dispense:  3 mL     Refill:  0      Inc ozempic to  2mg  No change in bp ,chol med    Follow up 3month  Let me know in  6 weeks how the 2mg dose is working  On 1 mg        Subjective   SUBJECTIVE/OBJECTIVE:  Diabetes      DM  On ozempic  1 mg  No nausea , vomiting , diarrhea constipation  Drinking enough  Lost some wt.    Used to take farxiga  Still has some left over .    Also on metformin    On eliquis for ho atrial fib  No blood in the stool  No balck stool    On prasvastatin for chol  No myalgias          Hemoglobin A1C (%)   Date Value   2024 6.7   2024 7.1   12/15/2023 7.6       Sodium (mmol/L)   Date Value   04/10/2024 141   2023 137   2023 139     Potassium (mmol/L)   Date Value   04/10/2024 4.7   2023 4.8   2023 4.4     Potassium reflex Magnesium (mmol/L)   Date Value   2019 4.7     Chloride (mmol/L)   Date Value   04/10/2024 104   2023 100   2023 105     CO2 (mmol/L)   Date Value   04/10/2024 25   2023 27   2023 28     BUN (mg/dL)   Date Value

## 2024-08-08 RX ORDER — SEMAGLUTIDE 2.68 MG/ML
INJECTION, SOLUTION SUBCUTANEOUS
Qty: 3 ML | Refills: 0 | Status: SHIPPED | OUTPATIENT
Start: 2024-08-08

## 2024-08-08 NOTE — TELEPHONE ENCOUNTER
Per last OV Dr. Sauer wanted the patient to call in to see how he was doing on the dose.     I spoke to the Patient he is doing good on the 2mg dose and would like a refill sent to [facility] mail.   Pls advise if OK

## 2024-08-21 LAB — DIABETIC RETINOPATHY: NEGATIVE

## 2024-09-03 RX ORDER — SEMAGLUTIDE 2.68 MG/ML
INJECTION, SOLUTION SUBCUTANEOUS
Qty: 3 ML | Refills: 1 | Status: SHIPPED | OUTPATIENT
Start: 2024-09-03

## 2024-10-02 RX ORDER — APIXABAN 5 MG/1
5 TABLET, FILM COATED ORAL 2 TIMES DAILY
Qty: 180 TABLET | Refills: 1 | Status: SHIPPED | OUTPATIENT
Start: 2024-10-02

## 2024-10-02 NOTE — TELEPHONE ENCOUNTER
Received refill request for  ELIQUIS 5 MG TABS tablet  from [facility] pharmacy.     Last OV: 5/29/24    Next OV:     Last Labs:     Last Filled: 2/17/24

## 2024-10-09 RX ORDER — METFORMIN HCL 500 MG
TABLET, EXTENDED RELEASE 24 HR ORAL
Qty: 270 TABLET | Refills: 1 | Status: SHIPPED | OUTPATIENT
Start: 2024-10-09

## 2024-10-16 ENCOUNTER — OFFICE VISIT (OUTPATIENT)
Dept: FAMILY MEDICINE CLINIC | Age: 69
End: 2024-10-16

## 2024-10-16 VITALS
RESPIRATION RATE: 16 BRPM | DIASTOLIC BLOOD PRESSURE: 72 MMHG | OXYGEN SATURATION: 96 % | SYSTOLIC BLOOD PRESSURE: 138 MMHG | WEIGHT: 214 LBS | HEART RATE: 67 BPM | BODY MASS INDEX: 31.6 KG/M2

## 2024-10-16 DIAGNOSIS — Z87.891 PERSONAL HISTORY OF TOBACCO USE: ICD-10-CM

## 2024-10-16 DIAGNOSIS — I10 ESSENTIAL HYPERTENSION: ICD-10-CM

## 2024-10-16 DIAGNOSIS — E11.9 CONTROLLED TYPE 2 DIABETES MELLITUS WITHOUT COMPLICATION, WITHOUT LONG-TERM CURRENT USE OF INSULIN (HCC): Primary | ICD-10-CM

## 2024-10-16 DIAGNOSIS — E78.5 DYSLIPIDEMIA: ICD-10-CM

## 2024-10-16 DIAGNOSIS — E11.69 TYPE 2 DIABETES MELLITUS WITH OBESITY (HCC): ICD-10-CM

## 2024-10-16 DIAGNOSIS — Z12.11 SCREENING FOR COLON CANCER: ICD-10-CM

## 2024-10-16 DIAGNOSIS — Z23 NEED FOR INFLUENZA VACCINATION: ICD-10-CM

## 2024-10-16 DIAGNOSIS — E66.9 TYPE 2 DIABETES MELLITUS WITH OBESITY (HCC): ICD-10-CM

## 2024-10-16 LAB — HBA1C MFR BLD: 6.5 %

## 2024-10-16 NOTE — PROGRESS NOTES
Rafael Perez (:  1955) is a 68 y.o. male, here for evaluation of the following chief complaint(s):  3 Month Follow-Up (Patient is here for a 3 month med follow up )      Assessment & Plan     Assessment/Plan  Rafael was seen today for 3 month follow-up.    Diagnoses and all orders for this visit:    Controlled type 2 diabetes mellitus without complication, without long-term current use of insulin (HCC)  -     POCT glycosylated hemoglobin (Hb A1C)    Type 2 diabetes mellitus with obesity (HCC)  -     POCT glycosylated hemoglobin (Hb A1C)    Need for influenza vaccination  -     Influenza, FLUAD Trivalent, (age 65 y+), IM, Preservative Free, 0.5mL    Essential hypertension  benazepril     Dyslipidemia    Personal history of tobacco use  -     CT VISIT TO DISCUSS LUNG CA SCREEN W LDCT  -     CT Lung Screen (Initial/Annual/Baseline); Future    Screening for colon cancer  -     AFL - Jl Villareal MD, Gastroenterology (ERCP & EUS), [facility] [place]       Doing great with DM  Lost  5 lbs since July  Bp little high stressful morning  No change meds    Still on eliquis with cardiology      Subjective   SUBJECTIVE/OBJECTIVE:  HPI  DM  On ozempic  2mg  Salad for lunch  Lettuce , broccoli  Tolerating ozempic      Htn on benazepril and diltiazem  Bp little high  Stressful morning  Traffic    On pravastatin  Tolerating  No myalgias  But  4 am calf cramps    Left leg worse than the right  Tightens  Last few nights nightly            Hemoglobin A1C (%)   Date Value   10/16/2024 6.5   2024 6.7   2024 7.1       Sodium (mmol/L)   Date Value   04/10/2024 141   2023 137   2023 139     Potassium (mmol/L)   Date Value   04/10/2024 4.7   2023 4.8   2023 4.4     Potassium reflex Magnesium (mmol/L)   Date Value   2019 4.7     Chloride (mmol/L)   Date Value   04/10/2024 104   2023 100   2023 105     CO2 (mmol/L)   Date Value   04/10/2024 25   2023 27

## 2024-10-16 NOTE — PATIENT INSTRUCTIONS
Try magnesium oxide  500mg /day       Learning About Lung Cancer Screening  What is screening for lung cancer?     Lung cancer screening is a way to find some lung cancers early, before a person has any symptoms of the cancer.  Lung cancer screening may help those who have the highest risk for lung cancer--people age 50 and older who are or were heavy smokers. For most people, who aren't at increased risk, screening for lung cancer probably isn't helpful.  Screening won't prevent cancer. And it may not find all lung cancers. Lung cancer screening may lower the risk of dying from lung cancer in a small number of people.  How is it done?  Lung cancer screening is done with a low-dose CT (computed tomography) scan. A CT scan uses X-rays, or radiation, to make detailed pictures of your body. Experts recommend that screening be done in medical centers that focus on finding and treating lung cancer.  Who is screening recommended for?  Lung cancer screening is recommended for people age 50 and older who are or were heavy smokers. That means people with a smoking history of at least 20 pack years. A pack year is a way to measure how heavy a smoker you are or were.  To figure out your pack years, multiply how many packs a day on average (assuming 20 cigarettes per pack) you have smoked by how many years you have smoked. For example:  If you smoked 1 pack a day for 20 years, that's 1 times 20. So you have a smoking history of 20 pack years.  If you smoked 2 packs a day for 10 years, that's 2 times 10. So you have a smoking history of 20 pack years.  Experts agree that screening is for people who have a high risk of lung cancer. But experts don't agree on what high risk means. Some say people age 50 or older with at least a 20-pack-year smoking history are high risk. Others say it's people age 55 or older with a 30-pack-year history.  To see if you could benefit from screening, first find out if you are at high risk for lung

## 2024-10-18 RX ORDER — SEMAGLUTIDE 2.68 MG/ML
INJECTION, SOLUTION SUBCUTANEOUS
Qty: 3 ML | Refills: 1 | OUTPATIENT
Start: 2024-10-18

## 2024-10-27 DIAGNOSIS — E11.9 CONTROLLED TYPE 2 DIABETES MELLITUS WITHOUT COMPLICATION, WITHOUT LONG-TERM CURRENT USE OF INSULIN (HCC): ICD-10-CM

## 2024-10-27 RX ORDER — SEMAGLUTIDE 2.68 MG/ML
INJECTION, SOLUTION SUBCUTANEOUS
Qty: 3 ML | Refills: 1 | Status: CANCELLED | OUTPATIENT
Start: 2024-10-27

## 2024-10-28 DIAGNOSIS — E11.69 TYPE 2 DIABETES MELLITUS WITH OBESITY (HCC): Primary | ICD-10-CM

## 2024-10-28 DIAGNOSIS — E66.9 TYPE 2 DIABETES MELLITUS WITH OBESITY (HCC): Primary | ICD-10-CM

## 2024-10-28 RX ORDER — LANCETS 30 GAUGE
1 EACH MISCELLANEOUS 2 TIMES DAILY
Qty: 200 EACH | Refills: 1 | Status: SHIPPED | OUTPATIENT
Start: 2024-10-28

## 2024-10-28 RX ORDER — SEMAGLUTIDE 2.68 MG/ML
INJECTION, SOLUTION SUBCUTANEOUS
Qty: 3 ML | Refills: 2 | Status: SHIPPED | OUTPATIENT
Start: 2024-10-28

## 2024-10-31 ENCOUNTER — TELEPHONE (OUTPATIENT)
Dept: FAMILY MEDICINE CLINIC | Age: 69
End: 2024-10-31

## 2024-10-31 NOTE — TELEPHONE ENCOUNTER
Pharmacy called in received a prescription for lancets says 2 times daily they need clarification to make daily this is correct        [facility] [phone]  Ref [id number]

## 2024-12-07 DIAGNOSIS — E78.5 DYSLIPIDEMIA: ICD-10-CM

## 2024-12-09 RX ORDER — PRAVASTATIN SODIUM 20 MG
TABLET ORAL
Qty: 90 TABLET | Refills: 1 | Status: SHIPPED | OUTPATIENT
Start: 2024-12-09

## 2025-01-07 RX ORDER — SEMAGLUTIDE 2.68 MG/ML
INJECTION, SOLUTION SUBCUTANEOUS
Qty: 3 ML | Refills: 2 | Status: SHIPPED | OUTPATIENT
Start: 2025-01-07

## 2025-01-09 ENCOUNTER — HOSPITAL ENCOUNTER (OUTPATIENT)
Dept: CT IMAGING | Age: 70
Discharge: HOME | End: 2025-01-09
Attending: INTERNAL MEDICINE
Payer: MEDICARE

## 2025-01-09 DIAGNOSIS — Z87.891 PERSONAL HISTORY OF TOBACCO USE: ICD-10-CM

## 2025-01-09 PROCEDURE — 71271 CT THORAX LUNG CANCER SCR C-: CPT

## 2025-01-14 SDOH — ECONOMIC STABILITY: INCOME INSECURITY: IN THE LAST 12 MONTHS, WAS THERE A TIME WHEN YOU WERE NOT ABLE TO PAY THE MORTGAGE OR RENT ON TIME?: NO

## 2025-01-14 SDOH — ECONOMIC STABILITY: FOOD INSECURITY: WITHIN THE PAST 12 MONTHS, YOU WORRIED THAT YOUR FOOD WOULD RUN OUT BEFORE YOU GOT MONEY TO BUY MORE.: NEVER TRUE

## 2025-01-14 SDOH — ECONOMIC STABILITY: FOOD INSECURITY: WITHIN THE PAST 12 MONTHS, THE FOOD YOU BOUGHT JUST DIDN'T LAST AND YOU DIDN'T HAVE MONEY TO GET MORE.: NEVER TRUE

## 2025-01-14 SDOH — ECONOMIC STABILITY: TRANSPORTATION INSECURITY
IN THE PAST 12 MONTHS, HAS THE LACK OF TRANSPORTATION KEPT YOU FROM MEDICAL APPOINTMENTS OR FROM GETTING MEDICATIONS?: NO

## 2025-01-14 ASSESSMENT — PATIENT HEALTH QUESTIONNAIRE - PHQ9
1. LITTLE INTEREST OR PLEASURE IN DOING THINGS: NOT AT ALL
2. FEELING DOWN, DEPRESSED OR HOPELESS: NOT AT ALL
SUM OF ALL RESPONSES TO PHQ9 QUESTIONS 1 & 2: 0
2. FEELING DOWN, DEPRESSED OR HOPELESS: NOT AT ALL
SUM OF ALL RESPONSES TO PHQ QUESTIONS 1-9: 0
SUM OF ALL RESPONSES TO PHQ9 QUESTIONS 1 & 2: 0
SUM OF ALL RESPONSES TO PHQ QUESTIONS 1-9: 0
1. LITTLE INTEREST OR PLEASURE IN DOING THINGS: NOT AT ALL
SUM OF ALL RESPONSES TO PHQ QUESTIONS 1-9: 0
SUM OF ALL RESPONSES TO PHQ QUESTIONS 1-9: 0

## 2025-01-17 ENCOUNTER — OFFICE VISIT (OUTPATIENT)
Dept: FAMILY MEDICINE CLINIC | Age: 70
End: 2025-01-17

## 2025-01-17 VITALS
SYSTOLIC BLOOD PRESSURE: 132 MMHG | HEART RATE: 67 BPM | RESPIRATION RATE: 16 BRPM | OXYGEN SATURATION: 95 % | DIASTOLIC BLOOD PRESSURE: 62 MMHG | BODY MASS INDEX: 31.45 KG/M2 | WEIGHT: 213 LBS

## 2025-01-17 DIAGNOSIS — I48.91 ATRIAL FIBRILLATION WITH RVR (HCC): ICD-10-CM

## 2025-01-17 DIAGNOSIS — E11.9 CONTROLLED TYPE 2 DIABETES MELLITUS WITHOUT COMPLICATION, WITHOUT LONG-TERM CURRENT USE OF INSULIN (HCC): Primary | ICD-10-CM

## 2025-01-17 DIAGNOSIS — E11.9 CONTROLLED TYPE 2 DIABETES MELLITUS WITHOUT COMPLICATION, WITHOUT LONG-TERM CURRENT USE OF INSULIN (HCC): ICD-10-CM

## 2025-01-17 DIAGNOSIS — E66.9 TYPE 2 DIABETES MELLITUS WITH OBESITY (HCC): ICD-10-CM

## 2025-01-17 DIAGNOSIS — E11.69 TYPE 2 DIABETES MELLITUS WITH OBESITY (HCC): ICD-10-CM

## 2025-01-17 DIAGNOSIS — I10 ESSENTIAL HYPERTENSION: ICD-10-CM

## 2025-01-17 NOTE — PROGRESS NOTES
Comments: Irreg nevi back of neck   Neurological:      Mental Status: He is alert.   Psychiatric:         Mood and Affect: Mood normal.         Behavior: Behavior normal.         Thought Content: Thought content normal.         Judgment: Judgment normal.             LISET JORDAN MD

## 2025-01-18 LAB
ALBUMIN SERPL-MCNC: 4.5 G/DL (ref 3.4–5)
ALBUMIN/GLOB SERPL: 2.4 {RATIO} (ref 1.1–2.2)
ALP SERPL-CCNC: 92 U/L (ref 40–129)
ALT SERPL-CCNC: 40 U/L (ref 10–40)
ANION GAP SERPL CALCULATED.3IONS-SCNC: 11 MMOL/L (ref 3–16)
AST SERPL-CCNC: 24 U/L (ref 15–37)
BASOPHILS # BLD: 0 K/UL (ref 0–0.2)
BASOPHILS NFR BLD: 0.4 %
BILIRUB SERPL-MCNC: 0.4 MG/DL (ref 0–1)
BUN SERPL-MCNC: 21 MG/DL (ref 7–20)
CALCIUM SERPL-MCNC: 10.1 MG/DL (ref 8.3–10.6)
CHLORIDE SERPL-SCNC: 103 MMOL/L (ref 99–110)
CO2 SERPL-SCNC: 26 MMOL/L (ref 21–32)
CREAT SERPL-MCNC: 0.9 MG/DL (ref 0.8–1.3)
DEPRECATED RDW RBC AUTO: 13.3 % (ref 12.4–15.4)
EOSINOPHIL # BLD: 0.1 K/UL (ref 0–0.6)
EOSINOPHIL NFR BLD: 2.4 %
EST. AVERAGE GLUCOSE BLD GHB EST-MCNC: 145.6 MG/DL
GFR SERPLBLD CREATININE-BSD FMLA CKD-EPI: >90 ML/MIN/{1.73_M2}
GLUCOSE SERPL-MCNC: 146 MG/DL (ref 70–99)
HBA1C MFR BLD: 6.7 %
HCT VFR BLD AUTO: 46 % (ref 40.5–52.5)
HGB BLD-MCNC: 15.8 G/DL (ref 13.5–17.5)
LYMPHOCYTES # BLD: 1.5 K/UL (ref 1–5.1)
LYMPHOCYTES NFR BLD: 25.8 %
MCH RBC QN AUTO: 34.3 PG (ref 26–34)
MCHC RBC AUTO-ENTMCNC: 34.3 G/DL (ref 31–36)
MCV RBC AUTO: 100 FL (ref 80–100)
MONOCYTES # BLD: 0.4 K/UL (ref 0–1.3)
MONOCYTES NFR BLD: 7.3 %
NEUTROPHILS # BLD: 3.7 K/UL (ref 1.7–7.7)
NEUTROPHILS NFR BLD: 64.1 %
PLATELET # BLD AUTO: 237 K/UL (ref 135–450)
PMV BLD AUTO: 8.9 FL (ref 5–10.5)
POTASSIUM SERPL-SCNC: 5.2 MMOL/L (ref 3.5–5.1)
PROT SERPL-MCNC: 6.4 G/DL (ref 6.4–8.2)
RBC # BLD AUTO: 4.6 M/UL (ref 4.2–5.9)
SODIUM SERPL-SCNC: 140 MMOL/L (ref 136–145)
WBC # BLD AUTO: 5.8 K/UL (ref 4–11)

## 2025-01-21 DIAGNOSIS — E87.5 HYPERKALEMIA: Primary | ICD-10-CM

## 2025-02-12 NOTE — TELEPHONE ENCOUNTER
Last ov:24 NPTS  Next ov:25 NPTS  Last EK24  Last labs:25  Last filled:   Disp Refills Start End    dilTIAZem (CARDIZEM CD) 240 MG extended release capsule 90 capsule 2 7/10/2024 --    Sig: TAKE 1 CAPSULE DAILY.    Sent to pharmacy as: dilTIAZem HCl ER Coated Beads 240 MG Oral Capsule Extended Release 24 Hour (CARDIZEM CD)    E-Prescribing Status: Receipt confirmed by pharmacy (7/10/2024  7:25 AM EDT)

## 2025-02-13 RX ORDER — DILTIAZEM HYDROCHLORIDE 240 MG/1
CAPSULE, COATED, EXTENDED RELEASE ORAL
Qty: 90 CAPSULE | Refills: 0 | Status: SHIPPED | OUTPATIENT
Start: 2025-02-13

## 2025-03-17 RX ORDER — SEMAGLUTIDE 2.68 MG/ML
INJECTION, SOLUTION SUBCUTANEOUS
Qty: 3 ML | Refills: 0 | Status: SHIPPED | OUTPATIENT
Start: 2025-03-17

## 2025-04-02 RX ORDER — METFORMIN HYDROCHLORIDE 500 MG/1
TABLET, EXTENDED RELEASE ORAL
Qty: 270 TABLET | Refills: 1 | Status: SHIPPED | OUTPATIENT
Start: 2025-04-02

## 2025-04-02 RX ORDER — APIXABAN 5 MG/1
5 TABLET, FILM COATED ORAL 2 TIMES DAILY
Qty: 180 TABLET | Refills: 1 | Status: SHIPPED | OUTPATIENT
Start: 2025-04-02

## 2025-04-02 NOTE — TELEPHONE ENCOUNTER
Received refill request for eliquis [facility]    Last ov:2024 NPTS    Last labs:2025    Last EK2024    Last Refill:10/02/2024    Next appointment:2025 NPTS

## 2025-04-08 RX ORDER — SEMAGLUTIDE 2.68 MG/ML
INJECTION, SOLUTION SUBCUTANEOUS
Qty: 3 ML | Refills: 0 | Status: SHIPPED | OUTPATIENT
Start: 2025-04-08

## 2025-04-25 ENCOUNTER — OFFICE VISIT (OUTPATIENT)
Dept: FAMILY MEDICINE CLINIC | Age: 70
End: 2025-04-25

## 2025-04-25 VITALS
OXYGEN SATURATION: 98 % | BODY MASS INDEX: 31.01 KG/M2 | WEIGHT: 210 LBS | RESPIRATION RATE: 18 BRPM | HEART RATE: 67 BPM | DIASTOLIC BLOOD PRESSURE: 72 MMHG | SYSTOLIC BLOOD PRESSURE: 142 MMHG

## 2025-04-25 DIAGNOSIS — I10 ESSENTIAL HYPERTENSION: ICD-10-CM

## 2025-04-25 DIAGNOSIS — E11.9 CONTROLLED TYPE 2 DIABETES MELLITUS WITHOUT COMPLICATION, WITHOUT LONG-TERM CURRENT USE OF INSULIN (HCC): Primary | ICD-10-CM

## 2025-04-25 DIAGNOSIS — E78.5 DYSLIPIDEMIA: ICD-10-CM

## 2025-04-25 LAB — HBA1C MFR BLD: 6.5 %

## 2025-04-25 RX ORDER — HYDROCHLOROTHIAZIDE 12.5 MG/1
12.5 CAPSULE ORAL EVERY MORNING
Qty: 90 CAPSULE | Refills: 0 | Status: SHIPPED | OUTPATIENT
Start: 2025-04-25

## 2025-04-25 RX ORDER — PRAVASTATIN SODIUM 20 MG
TABLET ORAL
Qty: 90 TABLET | Refills: 1 | Status: CANCELLED | OUTPATIENT
Start: 2025-04-25

## 2025-04-25 NOTE — PROGRESS NOTES
Rafael Perez (:  1955) is a 69 y.o. male, here for evaluation of the following chief complaint(s):  3 Month Follow-Up and Diabetes (Pt is here for a 3 month DM f/u )      Assessment & Plan   Rafael was seen today for 3 month follow-up and diabetes.    Diagnoses and all orders for this visit:    Controlled type 2 diabetes mellitus without complication, without long-term current use of insulin (HCC)  -     POCT glycosylated hemoglobin (Hb A1C)  -     Comprehensive Metabolic Panel; Future  -     Albumin/Creatinine Ratio, Urine; Future  -      DIABETES FOOT EXAM    Dyslipidemia declines medication    -     Lipid Panel; Future    Essential hypertension  -     hydroCHLOROthiazide 12.5 MG capsule; Take 1 capsule by mouth every morning       On benazepril  40mg   Cardizem 240   Orders Placed This Encounter   Medications    hydroCHLOROthiazide 12.5 MG capsule     Sig: Take 1 capsule by mouth every morning     Dispense:  90 capsule     Refill:  0      Seeing cardiology in may and will re ck bp there  [facility]   Return in about 3 months (around 2025).     I am ok stopping ozempic  But he wants to continue  Has not helped him that much  Might knee R this ear  Bp little high  No change lipid    Subjective   SUBJECTIVE/OBJECTIVE:  Diabetes        On ozempic  Tolerating ok  Did not lose wt  On metformin  Gets sick in the am    Dm hga1c 6.5  Overall ozempic got hga1c down by about a point  Farxiga did not help  Not losing wt  No interest in snacking  On metformin    On pravastatin   for chol    On lotensin for bp          Hemoglobin A1C (%)   Date Value   2025 6.5   2025 6.7   10/16/2024 6.5       Sodium (mmol/L)   Date Value   2025 140   04/10/2024 141   2023 137     Potassium (mmol/L)   Date Value   2025 5.2 (H)   04/10/2024 4.7   2023 4.8     Potassium reflex Magnesium (mmol/L)   Date Value   2019 4.7     Chloride (mmol/L)   Date Value

## 2025-05-06 RX ORDER — SEMAGLUTIDE 2.68 MG/ML
INJECTION, SOLUTION SUBCUTANEOUS
Qty: 3 ML | Refills: 2 | Status: SHIPPED | OUTPATIENT
Start: 2025-05-06

## 2025-05-06 NOTE — TELEPHONE ENCOUNTER
Requested Prescriptions     Pending Prescriptions Disp Refills    benazepril (LOTENSIN) 40 MG tablet [Pharmacy Med Name: BENAZEPRIL TAB 40MG] 90 tablet 3     Sig: TAKE 1 TABLET DAILY    dilTIAZem (CARDIZEM CD) 240 MG extended release capsule [Pharmacy Med Name: DILTIAZEM CD CAP 240MG/24] 90 capsule 0     Sig: TAKE 1 CAPSULE DAILY.      LAST OV: 5/29/2024   NEXT OV: 5/30/2025

## 2025-05-07 RX ORDER — BENAZEPRIL HYDROCHLORIDE 40 MG/1
40 TABLET ORAL DAILY
Qty: 90 TABLET | Refills: 3 | Status: SHIPPED | OUTPATIENT
Start: 2025-05-07

## 2025-05-07 RX ORDER — DILTIAZEM HYDROCHLORIDE 240 MG/1
240 CAPSULE, COATED, EXTENDED RELEASE ORAL DAILY
Qty: 90 CAPSULE | Refills: 0 | Status: SHIPPED | OUTPATIENT
Start: 2025-05-07

## 2025-05-14 DIAGNOSIS — E78.5 DYSLIPIDEMIA: ICD-10-CM

## 2025-05-14 DIAGNOSIS — E11.9 CONTROLLED TYPE 2 DIABETES MELLITUS WITHOUT COMPLICATION, WITHOUT LONG-TERM CURRENT USE OF INSULIN (HCC): ICD-10-CM

## 2025-05-14 LAB
ALBUMIN SERPL-MCNC: 4.3 G/DL (ref 3.4–5)
ALBUMIN/GLOB SERPL: 2.4 {RATIO} (ref 1.1–2.2)
ALP SERPL-CCNC: 89 U/L (ref 40–129)
ALT SERPL-CCNC: 42 U/L (ref 10–40)
ANION GAP SERPL CALCULATED.3IONS-SCNC: 10 MMOL/L (ref 3–16)
AST SERPL-CCNC: 31 U/L (ref 15–37)
BILIRUB SERPL-MCNC: 0.3 MG/DL (ref 0–1)
BUN SERPL-MCNC: 17 MG/DL (ref 7–20)
CALCIUM SERPL-MCNC: 9.1 MG/DL (ref 8.3–10.6)
CHLORIDE SERPL-SCNC: 100 MMOL/L (ref 99–110)
CHOLEST SERPL-MCNC: 110 MG/DL (ref 0–199)
CO2 SERPL-SCNC: 25 MMOL/L (ref 21–32)
CREAT SERPL-MCNC: 0.9 MG/DL (ref 0.8–1.3)
CREAT UR-MCNC: 115 MG/DL (ref 39–259)
GFR SERPLBLD CREATININE-BSD FMLA CKD-EPI: >90 ML/MIN/{1.73_M2}
GLUCOSE SERPL-MCNC: 143 MG/DL (ref 70–99)
HDLC SERPL-MCNC: 41 MG/DL (ref 40–60)
LDLC SERPL CALC-MCNC: 54 MG/DL
MICROALBUMIN UR DL<=1MG/L-MCNC: 1.66 MG/DL
MICROALBUMIN/CREAT UR: 14.4 MG/G (ref 0–30)
POTASSIUM SERPL-SCNC: 4.4 MMOL/L (ref 3.5–5.1)
PROT SERPL-MCNC: 6.1 G/DL (ref 6.4–8.2)
SODIUM SERPL-SCNC: 135 MMOL/L (ref 136–145)
TRIGL SERPL-MCNC: 76 MG/DL (ref 0–150)
VLDLC SERPL CALC-MCNC: 15 MG/DL

## 2025-05-15 ENCOUNTER — RESULTS FOLLOW-UP (OUTPATIENT)
Dept: FAMILY MEDICINE CLINIC | Age: 70
End: 2025-05-15

## 2025-05-30 ENCOUNTER — OFFICE VISIT (OUTPATIENT)
Dept: CARDIOLOGY CLINIC | Age: 70
End: 2025-05-30
Payer: MEDICARE

## 2025-05-30 VITALS
OXYGEN SATURATION: 98 % | DIASTOLIC BLOOD PRESSURE: 62 MMHG | HEART RATE: 70 BPM | SYSTOLIC BLOOD PRESSURE: 132 MMHG | WEIGHT: 218 LBS | HEIGHT: 69 IN | BODY MASS INDEX: 32.29 KG/M2

## 2025-05-30 DIAGNOSIS — I77.810 AORTIC ROOT DILATION: Primary | ICD-10-CM

## 2025-05-30 DIAGNOSIS — I48.0 PAF (PAROXYSMAL ATRIAL FIBRILLATION) (HCC): ICD-10-CM

## 2025-05-30 PROCEDURE — 3075F SYST BP GE 130 - 139MM HG: CPT | Performed by: NURSE PRACTITIONER

## 2025-05-30 PROCEDURE — 1123F ACP DISCUSS/DSCN MKR DOCD: CPT | Performed by: NURSE PRACTITIONER

## 2025-05-30 PROCEDURE — 3078F DIAST BP <80 MM HG: CPT | Performed by: NURSE PRACTITIONER

## 2025-05-30 PROCEDURE — 1159F MED LIST DOCD IN RCRD: CPT | Performed by: NURSE PRACTITIONER

## 2025-05-30 PROCEDURE — 99214 OFFICE O/P EST MOD 30 MIN: CPT | Performed by: NURSE PRACTITIONER

## 2025-05-30 NOTE — PATIENT INSTRUCTIONS
Echocardiogram this fall     Check your BP weekly and call us after 4 weeks with your readings ([facility])    Begin low dose HCTZ / hydrochlorothiazide for BP control    Appt in 4-5 months

## 2025-05-30 NOTE — PROGRESS NOTES
[facility]     Outpatient Follow Up Note    Rafael Perez is 69 y.o. male who presents today with a history of dilated AoR, PAF and HTN.      CHIEF COMPLAINT / HPI:  Follow Up secondary to paroxysmal atrial fibrillation and dilated AoR    Subjective:   His BP was trending upward so HCTZ was prescribed. However, he is allergic to sulfa and didn't start taking it.     He denies significant chest pain. There is no SOB/YANEZ. The patient denies orthopnea/PND. The patient does not have swelling. His wt is stable. The patient is not experiencing dizziness.   He could feel is AF as it was in the evening while sitting quietly. It would flop. He denies recurrence since taking diltiazem.    These symptoms show no change since the last OV.   With regard to medication therapy the patient has been compliant with prescribed regimen. They have tolerated therapy to date.     Past Medical History:   Diagnosis Date    Arthritis     Atrial fibrillation (HCC)     Dyslipidemia     H/O low back pain     Mild     Hypertension     Kidney stone     Type 2 diabetes mellitus without complication (HCC)      Social History:    Social History     Tobacco Use   Smoking Status Former    Current packs/day: 0.00    Average packs/day: 0.5 packs/day for 40.0 years (20.0 ttl pk-yrs)    Types: Cigarettes    Start date: 1976    Quit date: 2016    Years since quittin.3   Smokeless Tobacco Never   Tobacco Comments    smokes cigars 1-2 a week     Current Medications:  Current Outpatient Medications   Medication Sig Dispense Refill    benazepril (LOTENSIN) 40 MG tablet TAKE 1 TABLET DAILY 90 tablet 3    dilTIAZem (CARDIZEM CD) 240 MG extended release capsule TAKE 1 CAPSULE DAILY. 90 capsule 0    semaglutide, 2 MG/DOSE, (OZEMPIC, 2 MG/DOSE,) 8 MG/3ML SOPN sc injection INJECT 2MG SUBCUTANEOUSLY  ONCE WEEKLY FOR 4 WEEKS 3 mL 2    ELIQUIS 5 MG TABS tablet TAKE 1 TABLET TWICE A  tablet 1    metFORMIN (GLUCOPHAGE-XR) 500 MG extended

## 2025-07-06 DIAGNOSIS — I10 ESSENTIAL HYPERTENSION: ICD-10-CM

## 2025-07-07 RX ORDER — HYDROCHLOROTHIAZIDE 12.5 MG/1
12.5 CAPSULE ORAL EVERY MORNING
Qty: 90 CAPSULE | Refills: 1 | Status: SHIPPED | OUTPATIENT
Start: 2025-07-07

## 2025-07-08 DIAGNOSIS — E78.5 DYSLIPIDEMIA: ICD-10-CM

## 2025-07-08 RX ORDER — PRAVASTATIN SODIUM 20 MG
20 TABLET ORAL DAILY
Qty: 90 TABLET | Refills: 2 | Status: SHIPPED | OUTPATIENT
Start: 2025-07-08

## 2025-07-08 NOTE — TELEPHONE ENCOUNTER
LISET JORDAN MD, patient out of refills and past due to refill. Rx(s) pended for your signature/modification as appropriate    Last Visit: 4/25/25  Next Visit: 7/25/25    Thank you,  Estela Griffin, PharmD, [facility]  Population Health Pharmacist  [facility] Clinical Pharmacy  Department, toll free: [phone], option 1

## 2025-07-08 NOTE — TELEPHONE ENCOUNTER
[facility] CLINICAL PHARMACY: ADHERENCE REVIEW  Identified care gap per [employer]: fills at [facility]: Statin adherence    Patient also appears to be prescribed: ACE/ARB and Diabetes (100% PDC in  and YTD for both)    ASSESSMENT  STATIN ADHERENCE    Insurance Records claims through 25 (Prior Year PDC = 100% - PASSED; YTD PDC = FIRST FILL; Potential Fail Date: 25):   PRAVASTATIN  TAB 20MG last filled on 25 for 90 day supply. Next refill due: 25    Prescribed si tablet/capsule daily    Per Reconcile Dispense History: 0 refills remain    Lab Results   Component Value Date    CHOL 110 2025    TRIG 76 2025    HDL 41 2025     Lab Results   Component Value Date    LDL 54 2025      ALT   Date Value Ref Range Status   2025 42 (H) 10 - 40 U/L Final     AST   Date Value Ref Range Status   2025 31 15 - 37 U/L Final     The ASCVD Risk score (Gt DK, et al., 2019) failed to calculate for the following reasons:    The valid total cholesterol range is 130 to 320 mg/dL     PLAN  The following are interventions that have been identified:   Patient OVERDUE refilling pravastatin 20mg daily and active on home medication list.   Patient NEEDS REFILLS for pravastatin    Will pend refill request to prescriber.    Last Visit: 25  Next Visit: 25    Gail SerranoD, BCACP  Population Health Pharmacist   Clinical Pharmacy  Department, toll free: [phone], option 1

## 2025-07-09 NOTE — TELEPHONE ENCOUNTER
Noted pravastatin rx reordered, thank you!    MyChart message sent to patient.    =======================================================   For Pharmacy Admin Tracking Only    Program: Pop Health  CPA in place:  No  Recommendation Provided To: Provider: 1 via Note to Provider  Intervention Detail: Refill(s) Provided  Intervention Accepted By: Provider: 1  Gap Closed?: Yes   Time Spent (min): 10

## 2025-07-18 RX ORDER — SEMAGLUTIDE 2.68 MG/ML
INJECTION, SOLUTION SUBCUTANEOUS
Qty: 3 ML | Refills: 2 | Status: SHIPPED | OUTPATIENT
Start: 2025-07-18

## 2025-07-18 NOTE — TELEPHONE ENCOUNTER
Received refill request for Diltiazem from [facility] pharmacy.    Last ov:2025 NPTS    Last EK2024     Last Refill:2025    Next appointment:2025 ANITRA

## 2025-07-22 RX ORDER — DILTIAZEM HYDROCHLORIDE 240 MG/1
240 CAPSULE, COATED, EXTENDED RELEASE ORAL DAILY
Qty: 90 CAPSULE | Refills: 2 | Status: SHIPPED | OUTPATIENT
Start: 2025-07-22

## 2025-07-29 ENCOUNTER — OFFICE VISIT (OUTPATIENT)
Dept: FAMILY MEDICINE CLINIC | Age: 70
End: 2025-07-29

## 2025-07-29 VITALS
OXYGEN SATURATION: 96 % | DIASTOLIC BLOOD PRESSURE: 66 MMHG | WEIGHT: 214 LBS | SYSTOLIC BLOOD PRESSURE: 110 MMHG | RESPIRATION RATE: 18 BRPM | BODY MASS INDEX: 31.6 KG/M2 | HEART RATE: 62 BPM

## 2025-07-29 DIAGNOSIS — I10 ESSENTIAL HYPERTENSION: ICD-10-CM

## 2025-07-29 DIAGNOSIS — E11.9 CONTROLLED TYPE 2 DIABETES MELLITUS WITHOUT COMPLICATION, WITHOUT LONG-TERM CURRENT USE OF INSULIN (HCC): Primary | ICD-10-CM

## 2025-07-29 LAB — HBA1C MFR BLD: 7.2 %

## 2025-07-29 NOTE — PATIENT INSTRUCTIONS
Look up mounjaro      Use eucerin  cream on the legs ,  in a jar with red lid         Most Recent Value 7/29/2025  0847 5/30/2025  1042 5/30/2025  1023 4/25/2025  0850 1/17/2025  0832 10/16/2024  0822    Weight - Scale: 97.1 kg (214 lb)  as of 7/29/2025 97.1 kg (214 lb) -- 98.9 kg (218 lb) 95.3 kg (210 lb) 96.6 kg (213 lb) 97.1 kg (214

## 2025-07-29 NOTE — PROGRESS NOTES
Rafael Perez (:  1955) is a 69 y.o. male, here for evaluation of the following chief complaint(s):  3 Month Follow-Up and Diabetes (Pt is here for a 3 month DM f/u )      Assessment & Plan   Rafael was seen today for 3 month follow-up and diabetes.    Diagnoses and all orders for this visit:    Controlled type 2 diabetes mellitus without complication, without long-term current use of insulin (HCC)  Comments:  on ozempic  2 mg  and metformin  needs better control,  work diet /exercise  Orders:  -     POCT glycosylated hemoglobin (Hb A1C)  -     blood glucose monitor kit and supplies; Dispense sufficient amount for indicated testing frequency plus additional to accommodate PRN testing needs. Dispense all needed supplies to include: monitor, strips, lancing device, lancets, control solutions, alcohol swabs.to test daily.  #100 strips, lancing device , lancets    Essential hypertension  benazepril   Comments:  hctz  diltiazem         Orders Placed This Encounter   Medications    blood glucose monitor kit and supplies     Sig: Dispense sufficient amount for indicated testing frequency plus additional to accommodate PRN testing needs. Dispense all needed supplies to include: monitor, strips, lancing device, lancets, control solutions, alcohol swabs.to test daily.  #100 strips, lancing device , lancets     Dispense:  1 kit     Refill:  3        Return in about 3 months (around 10/29/2025).       Patient Instructions   Look up mounjaro          Most Recent Value 2025  0847 2025  1042 2025  1023 2025  0850 2025  0832 10/16/2024  0822    Weight - Scale: 97.1 kg (214 lb)  as of 2025 97.1 kg (214 lb) -- 98.9 kg (218 lb) 95.3 kg (210 lb) 96.6 kg (213 lb) 97.1 kg (214       Subjective   SUBJECTIVE/OBJECTIVE:  Diabetes      DM  Glucose was higher  Some inaccurate reading ?    Probably exercising less  Had allergic rxn  and was on steroids  Glucose went up  280  He is not sure his meter

## 2025-08-15 RX ORDER — BLOOD SUGAR DIAGNOSTIC
1 STRIP MISCELLANEOUS DAILY
Qty: 100 EACH | Refills: 3 | Status: SHIPPED | OUTPATIENT
Start: 2025-08-15